# Patient Record
Sex: FEMALE | Race: OTHER | HISPANIC OR LATINO | Employment: UNEMPLOYED | URBAN - METROPOLITAN AREA
[De-identification: names, ages, dates, MRNs, and addresses within clinical notes are randomized per-mention and may not be internally consistent; named-entity substitution may affect disease eponyms.]

---

## 2017-01-16 ENCOUNTER — ALLSCRIPTS OFFICE VISIT (OUTPATIENT)
Dept: OTHER | Facility: OTHER | Age: 42
End: 2017-01-16

## 2017-01-16 DIAGNOSIS — Z11.3 ENCOUNTER FOR SCREENING FOR INFECTIONS WITH PREDOMINANTLY SEXUAL MODE OF TRANSMISSION: ICD-10-CM

## 2017-01-16 PROCEDURE — 87491 CHLMYD TRACH DNA AMP PROBE: CPT | Performed by: FAMILY MEDICINE

## 2017-01-16 PROCEDURE — G0145 SCR C/V CYTO,THINLAYER,RESCR: HCPCS | Performed by: FAMILY MEDICINE

## 2017-01-16 PROCEDURE — 87591 N.GONORRHOEAE DNA AMP PROB: CPT | Performed by: FAMILY MEDICINE

## 2017-01-16 PROCEDURE — 87624 HPV HI-RISK TYP POOLED RSLT: CPT | Performed by: FAMILY MEDICINE

## 2017-01-18 ENCOUNTER — LAB REQUISITION (OUTPATIENT)
Dept: LAB | Facility: HOSPITAL | Age: 42
End: 2017-01-18
Payer: COMMERCIAL

## 2017-01-18 DIAGNOSIS — Z11.3 ENCOUNTER FOR SCREENING FOR INFECTIONS WITH PREDOMINANTLY SEXUAL MODE OF TRANSMISSION: ICD-10-CM

## 2017-01-18 DIAGNOSIS — Z80.3 FAMILY HISTORY OF MALIGNANT NEOPLASM OF BREAST: ICD-10-CM

## 2017-01-18 DIAGNOSIS — Z01.419 ENCOUNTER FOR GYNECOLOGICAL EXAMINATION WITHOUT ABNORMAL FINDING: ICD-10-CM

## 2017-01-20 LAB
CHLAMYDIA DNA CVX QL NAA+PROBE: NORMAL
N GONORRHOEA DNA GENITAL QL NAA+PROBE: NORMAL

## 2017-01-24 LAB
HPV RRNA GENITAL QL NAA+PROBE: NORMAL
LAB AP GYN PRIMARY INTERPRETATION: NORMAL
Lab: NORMAL

## 2017-01-29 ENCOUNTER — OFFICE VISIT (OUTPATIENT)
Dept: URGENT CARE | Age: 42
End: 2017-01-29
Payer: COMMERCIAL

## 2017-01-29 PROCEDURE — G0382 LEV 3 HOSP TYPE B ED VISIT: HCPCS | Performed by: FAMILY MEDICINE

## 2017-01-29 PROCEDURE — 99283 EMERGENCY DEPT VISIT LOW MDM: CPT | Performed by: FAMILY MEDICINE

## 2017-01-30 ENCOUNTER — ALLSCRIPTS OFFICE VISIT (OUTPATIENT)
Dept: OTHER | Facility: OTHER | Age: 42
End: 2017-01-30

## 2017-02-26 ENCOUNTER — HOSPITAL ENCOUNTER (OUTPATIENT)
Dept: ULTRASOUND IMAGING | Facility: HOSPITAL | Age: 42
Discharge: HOME | End: 2017-02-26
Payer: COMMERCIAL

## 2017-02-26 DIAGNOSIS — R93.89 ABNORMAL FINDINGS ON DIAGNOSTIC IMAGING OF OTHER SPECIFIED BODY STRUCTURES: ICD-10-CM

## 2017-02-26 PROCEDURE — 76536 US EXAM OF HEAD AND NECK: CPT

## 2017-03-06 ENCOUNTER — HOSPITAL ENCOUNTER (OUTPATIENT)
Dept: ULTRASOUND IMAGING | Facility: CLINIC | Age: 42
Discharge: HOME | End: 2017-03-06
Payer: COMMERCIAL

## 2017-03-06 ENCOUNTER — HOSPITAL ENCOUNTER (OUTPATIENT)
Dept: MAMMOGRAPHY | Facility: CLINIC | Age: 42
Discharge: HOME | End: 2017-03-06
Payer: COMMERCIAL

## 2017-03-06 DIAGNOSIS — N64.4 MASTODYNIA: ICD-10-CM

## 2017-03-06 PROCEDURE — G0206 DX MAMMO INCL CAD UNI: HCPCS

## 2017-03-06 PROCEDURE — 76642 ULTRASOUND BREAST LIMITED: CPT

## 2017-04-24 ENCOUNTER — GENERIC CONVERSION - ENCOUNTER (OUTPATIENT)
Dept: OTHER | Facility: OTHER | Age: 42
End: 2017-04-24

## 2017-05-08 ENCOUNTER — HOSPITAL ENCOUNTER (EMERGENCY)
Facility: HOSPITAL | Age: 42
Discharge: HOME | End: 2017-05-08
Attending: EMERGENCY MEDICINE
Payer: COMMERCIAL

## 2017-05-08 VITALS
TEMPERATURE: 97.7 F | HEART RATE: 85 BPM | OXYGEN SATURATION: 100 % | SYSTOLIC BLOOD PRESSURE: 117 MMHG | DIASTOLIC BLOOD PRESSURE: 72 MMHG | RESPIRATION RATE: 18 BRPM | WEIGHT: 110 LBS

## 2017-05-08 DIAGNOSIS — G89.29 CHRONIC RIGHT-SIDED LOW BACK PAIN WITHOUT SCIATICA: Primary | ICD-10-CM

## 2017-05-08 DIAGNOSIS — M54.50 CHRONIC RIGHT-SIDED LOW BACK PAIN WITHOUT SCIATICA: Primary | ICD-10-CM

## 2017-05-08 LAB
BACTERIA UR QL AUTO: ABNORMAL /HPF
BILIRUB UR QL STRIP: NEGATIVE
CLARITY UR: CLEAR
COLOR UR: YELLOW
COLOR, POC: NORMAL
GLUCOSE UR STRIP-MCNC: NEGATIVE MG/DL
HCG UR QL: NEGATIVE
HGB UR QL STRIP.AUTO: ABNORMAL
HYALINE CASTS #/AREA URNS LPF: ABNORMAL /LPF
KETONES UR STRIP-MCNC: NEGATIVE MG/DL
LEUKOCYTE ESTERASE UR QL STRIP: NEGATIVE
NITRITE UR QL STRIP: NEGATIVE
NON-SQ EPI CELLS URNS QL MICRO: ABNORMAL /HPF
PH UR STRIP.AUTO: 6.5 [PH] (ref 4.5–8)
PROT UR STRIP-MCNC: NEGATIVE MG/DL
RBC #/AREA URNS AUTO: ABNORMAL /HPF
SP GR UR STRIP.AUTO: 1.02 (ref 1–1.03)
UROBILINOGEN UR QL STRIP.AUTO: 0.2 E.U./DL
WBC #/AREA URNS AUTO: ABNORMAL /HPF

## 2017-05-08 PROCEDURE — 81002 URINALYSIS NONAUTO W/O SCOPE: CPT

## 2017-05-08 PROCEDURE — 87086 URINE CULTURE/COLONY COUNT: CPT

## 2017-05-08 PROCEDURE — 81025 URINE PREGNANCY TEST: CPT

## 2017-05-08 PROCEDURE — 96372 THER/PROPH/DIAG INJ SC/IM: CPT

## 2017-05-08 PROCEDURE — 99283 EMERGENCY DEPT VISIT LOW MDM: CPT

## 2017-05-08 PROCEDURE — 81001 URINALYSIS AUTO W/SCOPE: CPT

## 2017-05-08 RX ORDER — NAPROXEN 500 MG/1
500 TABLET ORAL 2 TIMES DAILY WITH MEALS
Qty: 60 TABLET | Refills: 0 | Status: SHIPPED | OUTPATIENT
Start: 2017-05-08 | End: 2017-09-05

## 2017-05-08 RX ORDER — KETOROLAC TROMETHAMINE 30 MG/ML
15 INJECTION, SOLUTION INTRAMUSCULAR; INTRAVENOUS ONCE
Status: COMPLETED | OUTPATIENT
Start: 2017-05-08 | End: 2017-05-08

## 2017-05-08 RX ORDER — OXYCODONE HYDROCHLORIDE AND ACETAMINOPHEN 5; 325 MG/1; MG/1
1 TABLET ORAL EVERY 4 HOURS PRN
Qty: 10 TABLET | Refills: 0 | Status: SHIPPED | OUTPATIENT
Start: 2017-05-08 | End: 2017-06-03

## 2017-05-08 RX ADMIN — KETOROLAC TROMETHAMINE 15 MG: 30 INJECTION, SOLUTION INTRAMUSCULAR at 22:45

## 2017-05-10 LAB — BACTERIA UR CULT: NORMAL

## 2017-05-12 ENCOUNTER — ALLSCRIPTS OFFICE VISIT (OUTPATIENT)
Dept: OTHER | Facility: OTHER | Age: 42
End: 2017-05-12

## 2017-05-12 DIAGNOSIS — R59.0 LOCALIZED ENLARGED LYMPH NODES: ICD-10-CM

## 2017-06-03 ENCOUNTER — APPOINTMENT (EMERGENCY)
Dept: RADIOLOGY | Facility: HOSPITAL | Age: 42
End: 2017-06-03
Payer: COMMERCIAL

## 2017-06-03 ENCOUNTER — HOSPITAL ENCOUNTER (EMERGENCY)
Facility: HOSPITAL | Age: 42
Discharge: HOME | End: 2017-06-03
Attending: EMERGENCY MEDICINE
Payer: COMMERCIAL

## 2017-06-03 VITALS
WEIGHT: 120 LBS | DIASTOLIC BLOOD PRESSURE: 73 MMHG | HEART RATE: 90 BPM | HEIGHT: 64 IN | TEMPERATURE: 98.8 F | RESPIRATION RATE: 20 BRPM | OXYGEN SATURATION: 100 % | SYSTOLIC BLOOD PRESSURE: 119 MMHG | BODY MASS INDEX: 20.49 KG/M2

## 2017-06-03 DIAGNOSIS — N83.202 LEFT OVARIAN CYST: ICD-10-CM

## 2017-06-03 DIAGNOSIS — R10.9 ABDOMINAL PAIN: Primary | ICD-10-CM

## 2017-06-03 DIAGNOSIS — K31.89 RETAINED FOOD IN STOMACH: ICD-10-CM

## 2017-06-03 LAB
ANION GAP BLD CALC-SCNC: 17 MMOL/L (ref 4–13)
BACTERIA UR QL AUTO: NORMAL /HPF
BILIRUB UR QL STRIP: NEGATIVE
BUN BLD-MCNC: 15 MG/DL (ref 5–25)
CA-I BLD-SCNC: 1.22 MMOL/L (ref 1.12–1.32)
CHLORIDE BLD-SCNC: 102 MMOL/L (ref 100–108)
CLARITY UR: CLEAR
COLOR UR: YELLOW
CREAT BLD-MCNC: 0.6 MG/DL (ref 0.6–1.3)
GFR SERPL CREATININE-BSD FRML MDRD: >60 ML/MIN/1.73SQ M
GLUCOSE SERPL-MCNC: 111 MG/DL (ref 65–140)
GLUCOSE UR STRIP-MCNC: NEGATIVE MG/DL
HCG UR QL: NEGATIVE
HCT VFR BLD CALC: 44 % (ref 34.8–46.1)
HGB BLDA-MCNC: 15 G/DL (ref 11.5–15.4)
HGB UR QL STRIP.AUTO: ABNORMAL
HYALINE CASTS #/AREA URNS LPF: NORMAL /LPF
KETONES UR STRIP-MCNC: NEGATIVE MG/DL
LEUKOCYTE ESTERASE UR QL STRIP: NEGATIVE
NITRITE UR QL STRIP: NEGATIVE
NON-SQ EPI CELLS URNS QL MICRO: NORMAL /HPF
PCO2 BLD: 26 MMOL/L (ref 21–32)
PH UR STRIP.AUTO: 6.5 [PH] (ref 4.5–8)
POTASSIUM BLD-SCNC: 3.9 MMOL/L (ref 3.5–5.3)
PROT UR STRIP-MCNC: NEGATIVE MG/DL
RBC #/AREA URNS AUTO: NORMAL /HPF
SODIUM BLD-SCNC: 140 MMOL/L (ref 136–145)
SP GR UR STRIP.AUTO: 1.02 (ref 1–1.03)
SPECIMEN SOURCE: ABNORMAL
UROBILINOGEN UR QL STRIP.AUTO: 0.2 E.U./DL
WBC #/AREA URNS AUTO: NORMAL /HPF

## 2017-06-03 PROCEDURE — 96372 THER/PROPH/DIAG INJ SC/IM: CPT

## 2017-06-03 PROCEDURE — 87591 N.GONORRHOEAE DNA AMP PROB: CPT | Performed by: EMERGENCY MEDICINE

## 2017-06-03 PROCEDURE — 85014 HEMATOCRIT: CPT

## 2017-06-03 PROCEDURE — 81001 URINALYSIS AUTO W/SCOPE: CPT

## 2017-06-03 PROCEDURE — 80047 BASIC METABLC PNL IONIZED CA: CPT

## 2017-06-03 PROCEDURE — 99284 EMERGENCY DEPT VISIT MOD MDM: CPT

## 2017-06-03 PROCEDURE — 81025 URINE PREGNANCY TEST: CPT | Performed by: EMERGENCY MEDICINE

## 2017-06-03 PROCEDURE — 81002 URINALYSIS NONAUTO W/O SCOPE: CPT | Performed by: EMERGENCY MEDICINE

## 2017-06-03 PROCEDURE — 87491 CHLMYD TRACH DNA AMP PROBE: CPT | Performed by: EMERGENCY MEDICINE

## 2017-06-03 PROCEDURE — 74177 CT ABD & PELVIS W/CONTRAST: CPT

## 2017-06-03 RX ORDER — NYSTATIN 100000 [USP'U]/G
POWDER TOPICAL 4 TIMES DAILY
COMMUNITY
End: 2019-09-13

## 2017-06-03 RX ORDER — KETOROLAC TROMETHAMINE 30 MG/ML
15 INJECTION, SOLUTION INTRAMUSCULAR; INTRAVENOUS ONCE
Status: DISCONTINUED | OUTPATIENT
Start: 2017-06-03 | End: 2017-06-03

## 2017-06-03 RX ORDER — KETOROLAC TROMETHAMINE 30 MG/ML
15 INJECTION, SOLUTION INTRAMUSCULAR; INTRAVENOUS ONCE
Status: COMPLETED | OUTPATIENT
Start: 2017-06-03 | End: 2017-06-03

## 2017-06-03 RX ORDER — MONTELUKAST SODIUM 10 MG/1
10 TABLET ORAL AS NEEDED
COMMUNITY
End: 2019-09-13

## 2017-06-03 RX ORDER — FLUTICASONE PROPIONATE 50 MCG
1 SPRAY, SUSPENSION (ML) NASAL DAILY
COMMUNITY
End: 2019-09-13

## 2017-06-03 RX ORDER — NAPROXEN 500 MG/1
500 TABLET ORAL 2 TIMES DAILY WITH MEALS
Qty: 10 TABLET | Refills: 0 | Status: SHIPPED | OUTPATIENT
Start: 2017-06-03 | End: 2017-09-05

## 2017-06-03 RX ORDER — LORATADINE 10 MG/1
10 TABLET ORAL DAILY
COMMUNITY
End: 2019-09-13

## 2017-06-03 RX ADMIN — IOHEXOL 100 ML: 350 INJECTION, SOLUTION INTRAVENOUS at 16:32

## 2017-06-03 RX ADMIN — KETOROLAC TROMETHAMINE 15 MG: 30 INJECTION, SOLUTION INTRAMUSCULAR at 17:10

## 2017-06-07 LAB
CHLAMYDIA DNA CVX QL NAA+PROBE: NORMAL
N GONORRHOEA DNA GENITAL QL NAA+PROBE: NORMAL

## 2017-06-12 ENCOUNTER — ALLSCRIPTS OFFICE VISIT (OUTPATIENT)
Dept: OTHER | Facility: OTHER | Age: 42
End: 2017-06-12

## 2017-06-26 ENCOUNTER — ALLSCRIPTS OFFICE VISIT (OUTPATIENT)
Dept: OTHER | Facility: OTHER | Age: 42
End: 2017-06-26

## 2017-06-26 DIAGNOSIS — R31.29 OTHER MICROSCOPIC HEMATURIA: ICD-10-CM

## 2017-07-29 ENCOUNTER — HOSPITAL ENCOUNTER (OUTPATIENT)
Dept: RADIOLOGY | Facility: HOSPITAL | Age: 42
Discharge: HOME | End: 2017-07-29
Payer: COMMERCIAL

## 2017-07-29 ENCOUNTER — APPOINTMENT (OUTPATIENT)
Dept: LAB | Facility: HOSPITAL | Age: 42
End: 2017-07-29
Attending: FAMILY MEDICINE
Payer: COMMERCIAL

## 2017-07-29 ENCOUNTER — TRANSCRIBE ORDERS (OUTPATIENT)
Dept: LAB | Facility: HOSPITAL | Age: 42
End: 2017-07-29

## 2017-07-29 DIAGNOSIS — R59.0 LOCALIZED ENLARGED LYMPH NODES: ICD-10-CM

## 2017-07-29 DIAGNOSIS — R31.29 OTHER MICROSCOPIC HEMATURIA: ICD-10-CM

## 2017-07-29 LAB
BILIRUB UR QL STRIP: NEGATIVE
CLARITY UR: NORMAL
COLOR UR: YELLOW
GLUCOSE UR STRIP-MCNC: NEGATIVE MG/DL
HGB UR QL STRIP.AUTO: NEGATIVE
KETONES UR STRIP-MCNC: NEGATIVE MG/DL
LEUKOCYTE ESTERASE UR QL STRIP: NEGATIVE
NITRITE UR QL STRIP: NEGATIVE
PH UR STRIP.AUTO: 7.5 [PH] (ref 4.5–8)
PROT UR STRIP-MCNC: NEGATIVE MG/DL
SP GR UR STRIP.AUTO: 1.02 (ref 1–1.03)
UROBILINOGEN UR QL STRIP.AUTO: 0.2 E.U./DL

## 2017-07-29 PROCEDURE — 81003 URINALYSIS AUTO W/O SCOPE: CPT

## 2017-07-29 PROCEDURE — 76536 US EXAM OF HEAD AND NECK: CPT

## 2017-09-05 ENCOUNTER — HOSPITAL ENCOUNTER (EMERGENCY)
Facility: HOSPITAL | Age: 42
Discharge: HOME | End: 2017-09-05
Attending: EMERGENCY MEDICINE | Admitting: EMERGENCY MEDICINE
Payer: COMMERCIAL

## 2017-09-05 ENCOUNTER — APPOINTMENT (EMERGENCY)
Dept: RADIOLOGY | Facility: HOSPITAL | Age: 42
End: 2017-09-05
Payer: COMMERCIAL

## 2017-09-05 VITALS
OXYGEN SATURATION: 99 % | RESPIRATION RATE: 16 BRPM | DIASTOLIC BLOOD PRESSURE: 54 MMHG | TEMPERATURE: 100.3 F | WEIGHT: 110 LBS | HEART RATE: 73 BPM | SYSTOLIC BLOOD PRESSURE: 99 MMHG | BODY MASS INDEX: 18.88 KG/M2

## 2017-09-05 DIAGNOSIS — R50.9 FEVER: ICD-10-CM

## 2017-09-05 DIAGNOSIS — R10.9 ABDOMINAL PAIN: Primary | ICD-10-CM

## 2017-09-05 DIAGNOSIS — R31.9 HEMATURIA: ICD-10-CM

## 2017-09-05 LAB
ALBUMIN SERPL BCP-MCNC: 3.5 G/DL (ref 3.5–5)
ALP SERPL-CCNC: 86 U/L (ref 46–116)
ALT SERPL W P-5'-P-CCNC: 16 U/L (ref 12–78)
ANION GAP SERPL CALCULATED.3IONS-SCNC: 7 MMOL/L (ref 4–13)
AST SERPL W P-5'-P-CCNC: 16 U/L (ref 5–45)
BACTERIA UR QL AUTO: ABNORMAL /HPF
BASOPHILS # BLD MANUAL: 0 THOUSAND/UL (ref 0–0.1)
BASOPHILS NFR MAR MANUAL: 0 % (ref 0–1)
BILIRUB SERPL-MCNC: 0.25 MG/DL (ref 0.2–1)
BILIRUB UR QL STRIP: NEGATIVE
BUN SERPL-MCNC: 12 MG/DL (ref 5–25)
CALCIUM SERPL-MCNC: 8 MG/DL (ref 8.3–10.1)
CHLORIDE SERPL-SCNC: 107 MMOL/L (ref 100–108)
CLARITY UR: CLEAR
CO2 SERPL-SCNC: 24 MMOL/L (ref 21–32)
COLOR UR: YELLOW
COLOR, POC: NORMAL
CREAT SERPL-MCNC: 0.65 MG/DL (ref 0.6–1.3)
EOSINOPHIL # BLD MANUAL: 0 THOUSAND/UL (ref 0–0.4)
EOSINOPHIL NFR BLD MANUAL: 0 % (ref 0–6)
ERYTHROCYTE [DISTWIDTH] IN BLOOD BY AUTOMATED COUNT: 13.1 % (ref 11.6–15.1)
GFR SERPL CREATININE-BSD FRML MDRD: 111 ML/MIN/1.73SQ M
GLUCOSE SERPL-MCNC: 104 MG/DL (ref 65–140)
GLUCOSE UR STRIP-MCNC: NEGATIVE MG/DL
HCG UR QL: NORMAL
HCG UR QL: NORMAL
HCT VFR BLD AUTO: 37.7 % (ref 34.8–46.1)
HGB BLD-MCNC: 13 G/DL (ref 11.5–15.4)
HGB UR QL STRIP.AUTO: ABNORMAL
KETONES UR STRIP-MCNC: NEGATIVE MG/DL
LEUKOCYTE ESTERASE UR QL STRIP: ABNORMAL
LYMPHOCYTES # BLD AUTO: 0.78 THOUSAND/UL (ref 0.6–4.47)
LYMPHOCYTES # BLD AUTO: 6 % (ref 14–44)
MCH RBC QN AUTO: 30.2 PG (ref 26.8–34.3)
MCHC RBC AUTO-ENTMCNC: 34.5 G/DL (ref 31.4–37.4)
MCV RBC AUTO: 88 FL (ref 82–98)
MONOCYTES # BLD AUTO: 0.39 THOUSAND/UL (ref 0–1.22)
MONOCYTES NFR BLD: 3 % (ref 4–12)
NEUTROPHILS # BLD MANUAL: 11.9 THOUSAND/UL (ref 1.85–7.62)
NEUTS SEG NFR BLD AUTO: 91 % (ref 43–75)
NITRITE UR QL STRIP: NEGATIVE
NON-SQ EPI CELLS URNS QL MICRO: ABNORMAL /HPF
NRBC BLD AUTO-RTO: 0 /100 WBCS
PH UR STRIP.AUTO: 7 [PH] (ref 4.5–8)
PLATELET # BLD AUTO: 257 THOUSANDS/UL (ref 149–390)
PLATELET BLD QL SMEAR: ADEQUATE
PMV BLD AUTO: 10.1 FL (ref 8.9–12.7)
POTASSIUM SERPL-SCNC: 3.7 MMOL/L (ref 3.5–5.3)
PROT SERPL-MCNC: 7.4 G/DL (ref 6.4–8.2)
PROT UR STRIP-MCNC: NEGATIVE MG/DL
RBC # BLD AUTO: 4.3 MILLION/UL (ref 3.81–5.12)
RBC #/AREA URNS AUTO: ABNORMAL /HPF
RBC MORPH BLD: NORMAL
SODIUM SERPL-SCNC: 138 MMOL/L (ref 136–145)
SP GR UR STRIP.AUTO: 1.02 (ref 1–1.03)
UROBILINOGEN UR QL STRIP.AUTO: 0.2 E.U./DL
WBC # BLD AUTO: 13.08 THOUSAND/UL (ref 4.31–10.16)
WBC #/AREA URNS AUTO: ABNORMAL /HPF

## 2017-09-05 PROCEDURE — 96360 HYDRATION IV INFUSION INIT: CPT

## 2017-09-05 PROCEDURE — 81025 URINE PREGNANCY TEST: CPT | Performed by: EMERGENCY MEDICINE

## 2017-09-05 PROCEDURE — 81002 URINALYSIS NONAUTO W/O SCOPE: CPT | Performed by: EMERGENCY MEDICINE

## 2017-09-05 PROCEDURE — 99284 EMERGENCY DEPT VISIT MOD MDM: CPT

## 2017-09-05 PROCEDURE — 81001 URINALYSIS AUTO W/SCOPE: CPT

## 2017-09-05 PROCEDURE — 85027 COMPLETE CBC AUTOMATED: CPT | Performed by: EMERGENCY MEDICINE

## 2017-09-05 PROCEDURE — 80053 COMPREHEN METABOLIC PANEL: CPT | Performed by: EMERGENCY MEDICINE

## 2017-09-05 PROCEDURE — 96361 HYDRATE IV INFUSION ADD-ON: CPT

## 2017-09-05 PROCEDURE — 85007 BL SMEAR W/DIFF WBC COUNT: CPT | Performed by: EMERGENCY MEDICINE

## 2017-09-05 PROCEDURE — 74177 CT ABD & PELVIS W/CONTRAST: CPT

## 2017-09-05 PROCEDURE — 36415 COLL VENOUS BLD VENIPUNCTURE: CPT | Performed by: EMERGENCY MEDICINE

## 2017-09-05 RX ORDER — ACETAMINOPHEN 325 MG/1
975 TABLET ORAL ONCE
Status: COMPLETED | OUTPATIENT
Start: 2017-09-05 | End: 2017-09-05

## 2017-09-05 RX ADMIN — IOHEXOL 100 ML: 350 INJECTION, SOLUTION INTRAVENOUS at 05:27

## 2017-09-05 RX ADMIN — ACETAMINOPHEN 975 MG: 325 TABLET, FILM COATED ORAL at 04:43

## 2017-09-05 RX ADMIN — SODIUM CHLORIDE 1000 ML: 0.9 INJECTION, SOLUTION INTRAVENOUS at 04:00

## 2017-09-25 ENCOUNTER — ALLSCRIPTS OFFICE VISIT (OUTPATIENT)
Dept: OTHER | Facility: OTHER | Age: 42
End: 2017-09-25

## 2017-11-07 ENCOUNTER — GENERIC CONVERSION - ENCOUNTER (OUTPATIENT)
Dept: OTHER | Facility: OTHER | Age: 42
End: 2017-11-07

## 2018-01-12 VITALS
HEART RATE: 78 BPM | WEIGHT: 111.5 LBS | HEIGHT: 61 IN | SYSTOLIC BLOOD PRESSURE: 106 MMHG | TEMPERATURE: 98.3 F | BODY MASS INDEX: 21.05 KG/M2 | RESPIRATION RATE: 16 BRPM | DIASTOLIC BLOOD PRESSURE: 60 MMHG

## 2018-01-12 NOTE — MISCELLANEOUS
Provider Comments  Provider Comments:   pt no showed today      Signatures   Electronically signed by : Taurus Krishnan, ; Apr 24 2017  9:29AM EST                       (Author)

## 2018-01-12 NOTE — MISCELLANEOUS
Provider Comments  Provider Comments:   PT NO SHOW FOR APPT      Signatures   Electronically signed by : Cami Sky, ; Apr 18 2016 11:29AM EST                       (Author)    Electronically signed by :  Beto Ahuja MD; Apr 18 2016 11:30AM EST                       (Author)
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
## 2018-01-13 VITALS
TEMPERATURE: 97.4 F | HEART RATE: 74 BPM | HEIGHT: 61 IN | WEIGHT: 111.38 LBS | DIASTOLIC BLOOD PRESSURE: 70 MMHG | RESPIRATION RATE: 14 BRPM | BODY MASS INDEX: 21.03 KG/M2 | SYSTOLIC BLOOD PRESSURE: 112 MMHG

## 2018-01-13 VITALS
HEIGHT: 61 IN | SYSTOLIC BLOOD PRESSURE: 110 MMHG | HEART RATE: 84 BPM | BODY MASS INDEX: 21 KG/M2 | TEMPERATURE: 98.4 F | DIASTOLIC BLOOD PRESSURE: 74 MMHG | WEIGHT: 111.25 LBS | RESPIRATION RATE: 18 BRPM

## 2018-01-13 NOTE — MISCELLANEOUS
Provider Comments  Provider Comments:   pt no showed today      Signatures   Electronically signed by : Vickie Kaur, ; Nov 7 2016  9:48AM EST                       (Author)
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
## 2018-01-14 VITALS
DIASTOLIC BLOOD PRESSURE: 70 MMHG | SYSTOLIC BLOOD PRESSURE: 110 MMHG | TEMPERATURE: 99 F | RESPIRATION RATE: 16 BRPM | WEIGHT: 111 LBS | BODY MASS INDEX: 20.97 KG/M2 | HEART RATE: 88 BPM

## 2018-01-16 ENCOUNTER — ALLSCRIPTS OFFICE VISIT (OUTPATIENT)
Dept: OTHER | Facility: OTHER | Age: 43
End: 2018-01-16

## 2018-01-16 DIAGNOSIS — Z80.9 FAMILY HISTORY OF MALIGNANT NEOPLASM: ICD-10-CM

## 2018-01-16 DIAGNOSIS — Z12.31 ENCOUNTER FOR SCREENING MAMMOGRAM FOR MALIGNANT NEOPLASM OF BREAST: ICD-10-CM

## 2018-01-16 DIAGNOSIS — Z13.220 ENCOUNTER FOR SCREENING FOR LIPOID DISORDERS: ICD-10-CM

## 2018-01-16 DIAGNOSIS — Z82.49 FAMILY HISTORY OF ISCHEMIC HEART DISEASE AND OTHER DISEASES OF THE CIRCULATORY SYSTEM: ICD-10-CM

## 2018-01-16 DIAGNOSIS — E55.9 VITAMIN D DEFICIENCY: ICD-10-CM

## 2018-01-16 DIAGNOSIS — E04.1 NONTOXIC SINGLE THYROID NODULE: ICD-10-CM

## 2018-01-16 DIAGNOSIS — N83.201 CYST OF RIGHT OVARY: ICD-10-CM

## 2018-01-16 DIAGNOSIS — R20.2 PARESTHESIA OF SKIN: ICD-10-CM

## 2018-01-16 NOTE — MISCELLANEOUS
Provider Comments  Provider Comments:   pt no showed today      Signatures   Electronically signed by : Ignacio Fields, ; Sep 25 2017  9:06AM EST                       (Author)

## 2018-01-16 NOTE — MISCELLANEOUS
Provider Comments  Provider Comments:   pt was a no show to appt today      Signatures   Electronically signed by : Elva Herrera, ; Jan 30 2017  5:38PM EST                       (Author)

## 2018-01-17 ENCOUNTER — HOSPITAL ENCOUNTER (OUTPATIENT)
Dept: RADIOLOGY | Age: 43
Discharge: HOME | End: 2018-01-17
Payer: COMMERCIAL

## 2018-01-17 ENCOUNTER — APPOINTMENT (OUTPATIENT)
Dept: LAB | Age: 43
End: 2018-01-17
Payer: COMMERCIAL

## 2018-01-17 ENCOUNTER — TRANSCRIBE ORDERS (OUTPATIENT)
Dept: ADMINISTRATIVE | Age: 43
End: 2018-01-17

## 2018-01-17 DIAGNOSIS — Z80.9 FAMILY HISTORY OF MALIGNANT NEOPLASM: ICD-10-CM

## 2018-01-17 DIAGNOSIS — E04.1 NONTOXIC SINGLE THYROID NODULE: ICD-10-CM

## 2018-01-17 DIAGNOSIS — R20.2 PARESTHESIA OF SKIN: ICD-10-CM

## 2018-01-17 DIAGNOSIS — N83.201 CYST OF RIGHT OVARY: ICD-10-CM

## 2018-01-17 DIAGNOSIS — Z13.220 ENCOUNTER FOR SCREENING FOR LIPOID DISORDERS: ICD-10-CM

## 2018-01-17 DIAGNOSIS — E55.9 VITAMIN D DEFICIENCY: ICD-10-CM

## 2018-01-17 DIAGNOSIS — Z82.49 FAMILY HISTORY OF ISCHEMIC HEART DISEASE AND OTHER DISEASES OF THE CIRCULATORY SYSTEM: ICD-10-CM

## 2018-01-17 LAB
25(OH)D3 SERPL-MCNC: 16.5 NG/ML (ref 30–100)
ALBUMIN SERPL BCP-MCNC: 3.8 G/DL (ref 3.5–5)
ALP SERPL-CCNC: 69 U/L (ref 46–116)
ALT SERPL W P-5'-P-CCNC: 18 U/L (ref 12–78)
ANION GAP SERPL CALCULATED.3IONS-SCNC: 6 MMOL/L (ref 4–13)
AST SERPL W P-5'-P-CCNC: 15 U/L (ref 5–45)
BASOPHILS # BLD AUTO: 0.01 THOUSANDS/ΜL (ref 0–0.1)
BASOPHILS NFR BLD AUTO: 0 % (ref 0–1)
BILIRUB SERPL-MCNC: 0.53 MG/DL (ref 0.2–1)
BUN SERPL-MCNC: 16 MG/DL (ref 5–25)
CALCIUM SERPL-MCNC: 8.6 MG/DL (ref 8.3–10.1)
CHLORIDE SERPL-SCNC: 104 MMOL/L (ref 100–108)
CO2 SERPL-SCNC: 28 MMOL/L (ref 21–32)
CREAT SERPL-MCNC: 0.51 MG/DL (ref 0.6–1.3)
EOSINOPHIL # BLD AUTO: 0.09 THOUSAND/ΜL (ref 0–0.61)
EOSINOPHIL NFR BLD AUTO: 2 % (ref 0–6)
ERYTHROCYTE [DISTWIDTH] IN BLOOD BY AUTOMATED COUNT: 13.3 % (ref 11.6–15.1)
GFR SERPL CREATININE-BSD FRML MDRD: 120 ML/MIN/1.73SQ M
GLUCOSE P FAST SERPL-MCNC: 84 MG/DL (ref 65–99)
HCT VFR BLD AUTO: 40 % (ref 34.8–46.1)
HGB BLD-MCNC: 13.6 G/DL (ref 11.5–15.4)
LYMPHOCYTES # BLD AUTO: 1.39 THOUSANDS/ΜL (ref 0.6–4.47)
LYMPHOCYTES NFR BLD AUTO: 29 % (ref 14–44)
MCH RBC QN AUTO: 30.2 PG (ref 26.8–34.3)
MCHC RBC AUTO-ENTMCNC: 34 G/DL (ref 31.4–37.4)
MCV RBC AUTO: 89 FL (ref 82–98)
MONOCYTES # BLD AUTO: 0.34 THOUSAND/ΜL (ref 0.17–1.22)
MONOCYTES NFR BLD AUTO: 7 % (ref 4–12)
NEUTROPHILS # BLD AUTO: 2.97 THOUSANDS/ΜL (ref 1.85–7.62)
NEUTS SEG NFR BLD AUTO: 62 % (ref 43–75)
NRBC BLD AUTO-RTO: 0 /100 WBCS
PLATELET # BLD AUTO: 309 THOUSANDS/UL (ref 149–390)
PMV BLD AUTO: 10.5 FL (ref 8.9–12.7)
POTASSIUM SERPL-SCNC: 3.7 MMOL/L (ref 3.5–5.3)
PROT SERPL-MCNC: 7.5 G/DL (ref 6.4–8.2)
RBC # BLD AUTO: 4.51 MILLION/UL (ref 3.81–5.12)
SODIUM SERPL-SCNC: 138 MMOL/L (ref 136–145)
TSH SERPL DL<=0.05 MIU/L-ACNC: 1.57 UIU/ML (ref 0.36–3.74)
VIT B12 SERPL-MCNC: 164 PG/ML (ref 100–900)
WBC # BLD AUTO: 4.8 THOUSAND/UL (ref 4.31–10.16)

## 2018-01-17 PROCEDURE — 82607 VITAMIN B-12: CPT

## 2018-01-17 PROCEDURE — 85025 COMPLETE CBC W/AUTO DIFF WBC: CPT

## 2018-01-17 PROCEDURE — 36415 COLL VENOUS BLD VENIPUNCTURE: CPT

## 2018-01-17 PROCEDURE — 80053 COMPREHEN METABOLIC PANEL: CPT

## 2018-01-17 PROCEDURE — 76856 US EXAM PELVIC COMPLETE: CPT

## 2018-01-17 PROCEDURE — 82306 VITAMIN D 25 HYDROXY: CPT

## 2018-01-17 PROCEDURE — 76830 TRANSVAGINAL US NON-OB: CPT

## 2018-01-17 PROCEDURE — 84443 ASSAY THYROID STIM HORMONE: CPT

## 2018-01-17 PROCEDURE — 83918 ORGANIC ACIDS TOTAL QUANT: CPT

## 2018-01-17 PROCEDURE — 76536 US EXAM OF HEAD AND NECK: CPT

## 2018-01-17 NOTE — PROGRESS NOTES
Assessment   1  Thyroid nodule (241 0) (E04 1)   2  Right hand paresthesia (782 0) (R20 2)   3  Vitamin D deficiency (268 9) (E55 9)   4  FHx: malignant neoplasm of breast (V16 3) (Z80 3) : Mother   · in 29's   5  Visit for screening mammogram (V76 12) (Z12 31)   6  Family history of Metastasis from thyroid cancer : Mother   7  Paresthesia (782 0) (R20 2)   8  Encounter for lipid screening for cardiovascular disease (V77 91,V81 2) (Z13 220,Z13 6)   9  Family history of Type 2 diabetes mellitus without complication, without long-term current     use of insulin : Mother   8  Family history of essential hypertension (V17 49) (Z82 49) : Mother   6  Abnormal uterine bleeding (AUB) (626 9) (N93 9)   12  Cyst of right ovary (620 2) (N83 201)    Plan   Cyst of right ovary    · US PELVIS COMPLETE NON OB; Status:Hold For - Scheduling; Requested    for:16Jan2018;   Encounter for lipid screening for cardiovascular disease, FamHx: Family history of    essential hypertension, FamHx: Type 1 diabetes mellitus with other kidney complication,    FamHx: Type 2 diabetes mellitus without complication, without long-term current use of    insulin    · (1) COMPREHENSIVE METABOLIC PANEL; Status:Active; Requested EAQ:05BJN2578;   FamHx: Family history of malignant neoplasm, Right hand paresthesia    · (1) CBC/PLT/DIFF; Status:Active; Requested for:16Jan2018;   FamHx: Metastasis from thyroid cancer, Visit for screening mammogram    · * MAMMO SCREENING BILATERAL W CAD; Status:Hold For - Scheduling; Requested    for:16Jan2018;   Paresthesia, Vitamin D deficiency    · (1) VITAMIN B12; Status:Active; Requested AFY:03LCW9605;   Right hand paresthesia    · (1) METHYLMALONIC ACID,BLOOD; Status:Active; Requested MGJ:12ZHG5730; Thyroid nodule    · (1) TSH WITH FT4 REFLEX; Status:Active;  Requested CEZ:28IOA0924;    · US THYROID; Status:Hold For - Scheduling; Requested for:16Jan2018;   Vitamin D deficiency    · (1) VITAMIN D 25-HYDROXY; Status:Active; Requested KCQ:67HMU5010; Discussion/Summary      3+ chronic problems and a new problem reviewed  AUB, recurrent, in Lower abdominal pain and h/o R ovarian cyst Pap last year unremarkable, per patient  Pelvic US ordered Rec'd ob-gyn follow-up to have specialist on board with patient care given extensive PMH, family history of breast cancer (prior attempt for genetic testing denied by insurance)  mammogram ordered  Thyroid nodule in h/o thyroid disease No longer with rx management F/u Thyroid US, TSH   Mild persistent asthma in allergic rhinitis, controlled Continue ventolin prn, flonase qd, loratadine 10mg qd F/u asthma action plan   RUE hand paresthesias in h/o B12 deficiency Recheck MMA, B12, CBC   H/o Vit D insufficiency Recheck Vit D   Preventive screening, , higher risk individual Family history of mother with death from thyroid cancer also w/h/o breast cancer, HTN, NIDDM2  Check CMP, lipid panel  The patient was counseled regarding diagnostic results,-- instructions for management,-- risk factor reductions,-- impressions,-- risks and benefits of treatment options,-- importance of compliance with treatment  Possible side effects of new medications were reviewed with the patient/guardian today  The treatment plan was reviewed with the patient/guardian  The patient/guardian understands and agrees with the treatment plan       Self Referrals: No      Chief Complaint   f/u PMH surveillance: thyroid nodule, mammogram screening,      History of Present Illness   39 yo non-smoker F presented from home for a follow-up visit for monitoring of the PMH:   Asthma, mild persistent: Last exacerbation noted 11/2017 treated with steroids and z-maureen  Flonase qd and loratadine 10mg qd for allergies, ventolin prn  No  rx   Patient only notes flares with winter colds occasionally, non-smoker (smoked at age 22 for a couple of years), demonstrates appropriate inhaler technique, no barriers to rx usage, no pet triggers, never intubated  ACT score for qd/hs/control/activity changes/ albuterol usage w/symptoms=23 total = 5-5-4-5-4 (once or twice has a cough, rated as well-controlled)  Chronic LBP w/L5/S1 small herniated disc w/R-sided radiculopathy: Stable  Evaluated by neurology in 2/2015: No evidence of peripheral neuropathy on EMG  F/u with neurosurgery (last seen 6/2014, offered microdiscectomy) & pain management  Neurology rec'd repeat MMA and B12 at f/u at that time of visit  Last flare of back pain managed by our office in summer 2017 with flexeril and tramadol given  HM: Oscal 500/200 taken qd  Tongue papilloma: I recommended ENT f/u in 6/2014  No f/u noted due to resolution  H/o Microscopic hematuria: Resolved  F/u UA was negative in 6/2017  GC negative at that time  Family history: recalled extensive family history of cancer of breast, colon, thyroid  AUB: previously evaluated in 2014, h/o contraception usage with improvement  Not fully reviewed today due to other patient priorities  Notes menses for a couple of days, then spotting then a couple of days later for 3 days, c/o occurrence every 3 weeks over the past 2-3 months  review: Vit D deficiency: Last noted to be 29/6 in 12/2016 MMA elevated with low B12: last noted in 2015  CBC and CMP unremarkable in fall 2016  lipids unremarkable from 3/2016  Previous SPEP, CRP, ESR, NYASIA w/u unremarkable  Review of Systems        Constitutional: no fever-- and-- no chills  ENT: no nasal discharge  Cardiovascular: no chest pain-- and-- no palpitations  Respiratory: no shortness of breath,-- no cough-- and-- no wheezing  Gastrointestinal: abdominal pain-- and-- lower abdominal discomfort, intermittent, non-radiating, not associated with dysuria, a/w back pain that is chronic, but-- as noted in HPI        Genitourinary: pelvic pain-- and-- unexplained vaginal bleeding, but-- no dysuria,-- no vaginal discharge-- and-- no incontinence  Musculoskeletal: arthralgias-- and-- joint stiffness, but-- as noted in HPI,-- no joint swelling,-- no limb pain-- and-- no limb swelling  Integumentary: no rashes  Neurological: tingling-- and-- RUE paresthesias located to hand mainly, but-- no confusion,-- no dizziness,-- no limb weakness,-- no convulsions,-- no fainting-- and-- no difficulty walking  Psychiatric: emotional problems-- and-- deals with death of her mother that occurred within a few years of diagnosis  Hematologic/Lymphatic: no swollen glands,-- no tendency for easy bleeding-- and-- no tendency for easy bruising  Active Problems   1  Abdominal pain (789 00) (R10 9)   2  Abnormal ultrasound of thyroid gland (794 5) (R93 8)   3  Acute sinusitis (461 9) (J01 90)   4  Acute URI (465 9) (J06 9)   5  Anxiety (300 00) (F41 9)   6  Asthma exacerbation (493 92) (J45 901)   7  Breast pain (611 71) (N64 4)   8  Bulging lumbar disc (722 10) (M51 26)   9  Cold intolerance (780 99) (R68 89)   10  Cough variant asthma (493 82) (J45 991)   11  Dense breast tissue (793 82) (R92 2)   12  Depo contraception (V25 49) (Z30 40)   13  Dermatitis (692 9) (L30 9)   14  Encounter for screening mammogram for high-risk patient (V76 11) (Z12 31)   15  Enlarged lymph node in neck (785 6) (R59 0)   16  Familial Breast-ovarian Cancer Syndrome (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 3)   17  Fatigue (780 79) (R53 83)   18  Fever (780 60) (R50 9)   19  Initiation of Depo Provera (V25 02) (Z30 013)   20  Joint pain, knee (719 46) (M25 569)   21  Low back pain (724 2) (M54 5)   22  Lumbago (724 2) (M54 5)   23  Lumbar disc herniation (722 10) (M51 26)   24  Lumbar radiculopathy (724 4) (M54 16)   25  Menorrhagia (626 2) (N92 0)   26  Metrorrhagia (626 6) (N92 1)   27  Moderate persistent asthma (493 90) (J45 40)   28  Neck pain (723 1) (M54 2)   29  Need for diphtheria-tetanus-pertussis (Tdap) vaccine, adult/adolescent (V06 1) (Z23)   30   Need for influenza vaccination (V04 81) (Z23)   31  Paresthesia (782 0) (R20 2)   32  Peripheral neuropathy (356 9) (G62 9)   33  Polyneuropathy (356 9) (G62 9)   34  Sciatica of right side (724 3) (M54 31)   35  Screen for STD (sexually transmitted disease) (V74 5) (Z11 3)   36  Seasonal allergies (477 9) (J30 2)   37  Thyroid nodule (241 0) (E04 1)   38  Visit for routine gyn exam (V72 31) (Z01 419)   39  Visit for screening mammogram (V76 12) (Z12 31)   40  Vitamin D deficiency (268 9) (E55 9)   41  Vulvovaginitis candida albicans (112 1) (B37 3)    Past Medical History   1  Denied: History of substance abuse   2  History of tinea cruris (V12 09) (Z86 19)   3  History of vaginal discharge (V13 29) (Z87 42)   4  Denied: History of Mental health problem   5  History of Microscopic hematuria (599 72) (R31 29)   6  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)   7  History of Thyroid disorder (246 9) (E07 9)   8  History of Vaginal candidiasis (112 1) (B37 3)    Surgical History   1  History of Denial Of Any Significant Medical History   2  Initiation of Depo Provera (V25 02) (Z30 013)    Family History   Mother    1  Family history of essential hypertension (V17 49) (Z82 49)   2  Denied: Family history of mental disorder   3  Denied: Family history of substance abuse   4  FHx: malignant neoplasm of breast (V16 3) (Z80 3)   5  Family history of Metastasis from thyroid cancer   6  Family history of Ovarian Cancer (V16 41)   7  Family history of Type 1 diabetes mellitus with other kidney complication   8  Family history of Type 2 diabetes mellitus without complication, without long-term current     use of insulin  Father    5  Family history of malignant neoplasm (V16 9) (Z80 9)  Maternal Grandmother    10  Family history of hypertension (V17 49) (Z82 49)  Paternal Grandfather    6  Family history of malignant neoplasm (V16 9) (Z80 9)  Maternal Aunt    12   Family history of Ovarian Cancer (V16 41)    Social History    · Caffeine use (V49 89) (F15 90)   · Completed 12th grade   · Depo contraception (V25 49) (Z30 40)   · Denied: History of drug use   · Lives independently with spouse   ·    · Never A Smoker   · Never Drank Alcohol   · Occupation   · Uses Safety Equipment - Seatbelts    Current Meds    1  [employer] Allergy Relief 0 025-0 3 % Ophthalmic Solution; INSTILL 1 DROP INTO BOTH     EYES 2 TIMES DAILY; Therapy: 32GKA2226 to (Last Rx:12May2017)  Requested for: 89LUP9326 Ordered   2  Fluticasone Propionate 50 MCG/ACT Nasal Suspension; USE 2 SPRAYS IN EACH     NOSTRIL ONCE DAILY; Therapy: 74SJB2809 to (Kellee Epp)  Requested for: 11NDS4850; Last     Rx:07Nov2017 Ordered   3  Loratadine 10 MG Oral Tablet; Take 1 tablet daily; Therapy: 51VPO6235 to (Felicia Martins)  Requested for: 13JQT8164; Last     Rx:07Nov2017 Ordered   4  Nystop 036946 UNIT/GM External Powder; apply to affected area twice a day AS     DIRECTED; Therapy: 53Tir8918 to (Barb Quest)  Requested for: 11KID2698; Last     Rx:12May2017 Ordered   5  Oscal 500/200 D-3 500-200 MG-UNIT Oral Tablet; TAKE 1 TABLET DAILY AS DIRECTED; Therapy: 16UIW5761 to (Evaluate:83Jfd0765)  Requested for: 50JCM1099; Last     Rx:01Mar2016 Ordered   6  Ventolin  (90 Base) MCG/ACT Inhalation Aerosol Solution; INHALE 1 TO 2 PUFFS     EVERY 4 TO 6 HOURS AS NEEDED; Therapy: 04OIH3398 to (Last KP:08BKK4633)  Requested for: 26ZBS9221 Ordered    Allergies   1  No Known Drug Allergies  2  No Known Environmental Allergies   3  No Known Food Allergies    Vitals   Vital Signs    Recorded: 19NKF5084 01:51PM   Temperature 97 8 F, Tympanic   Heart Rate 72   Respiration 14   Systolic 192, RUE, Sitting   Diastolic 72, RUE, Sitting   Height 5 ft 1 in   Weight 115 lb    BMI Calculated 21 73   BSA Calculated 1 49   Pain Scale 0     Physical Exam        Constitutional      General appearance: No acute distress, well appearing and well nourished         Eyes Conjunctiva and lids: No swelling, erythema or discharge  Ears, Nose, Mouth, and Throat      Oropharynx: Normal with no erythema, edema, exudate or lesions  -- Prior papilloma no longer present  Pulmonary      Respiratory effort: No increased work of breathing or signs of respiratory distress  Auscultation of lungs: Clear to auscultation  Cardiovascular      Auscultation of heart: Normal rate and rhythm, normal S1 and S2, without murmurs  Examination of extremities for edema and/or varicosities: Normal        Musculoskeletal      Gait and station: Normal        Psychiatric      Orientation to person, place, and time: Normal        Mood and affect: Normal           Results/Data   * US PELVIS W/TRANSVAG (PANEL) 12Apr2014 08:05AM [facility]      Test Name Result Flag Reference   US PELVIS W/ [place] (PANEL) (Report)     [address];;[place];[address]     04/12/2014 0810     04/12/2014 0830               PELVIC ULTRASOUND, COMPLETE          INDICATION- Dysfunctional uterine bleeding  COMPARISON- 3/8/2013          TECHNIQUE-  Transabdominal pelvic ultrasound was performed in sagittal     and transverse planes with a curvilinear transducer  Additional     transvaginal imaging was performed to better evaluate the endometrium     and ovaries  Imaging included volumetric sweeps as well as traditional     still imaging technique  FINDINGS-          UTERUS-     The uterus is anteverted in position, measuring 8 0 x 4 0 x 5 6 cm  Contour and echotexture appear normal        The cervix shows no suspicious abnormality  ENDOMETRIUM-      Normal caliber of 8 mm  Small amount of endometrial fluid is noted  OVARIES/ADNEXA-     Right ovary- 4 2 x 2 9 x 3 5 cm  Doppler flow present  3 6 x 2 7 x 3 1 cm right ovarian cyst is identified with small mural     nodule with no internal vascularity             Left ovary- 2 9 x 1 5 x 2 0 cm  Doppler flow present  No suspicious abnormality  There are no adnexal mass lesions evident  There is no free fluid  IMPRESSION-          Right ovarian cyst with small mural nodule without internal vascularity  Small amount of endometrial fluid  Transcribed on- HENRY Hay MD     Reading Radiologist- HENRY Lang MD     Releasing Radiologist- HENRY Lang MD     Released Date Time- 04/14/14 1924     ------------------------------------------------------------------------------     [id number]     [id number]        Attending Note   Attending Note: I interviewed, took the history and examined the patient,-- the staff discussed the patient on the day of the visit,-- I discussed the case with the Resident and reviewed the Resident's note,-- I supervised the Resident-- and-- I agree with the Resident management plan as it was presented to me  Level of Participation: I was present in clinic and examined the patient  I agree with the Resident's note        Future Appointments      Date/Time Provider Specialty Site   01/29/2018 08:30 AM Ananya Pablo  [address]     Signatures    Electronically signed by : Patrice Christine DO; Jan 16 2018  3:01PM EST                       (Author)     Electronically signed by : MILDRED Martinez ; Jan 16 2018  3:32PM EST                       (Author)

## 2018-01-20 LAB — METHYLMALONATE SERPL-SCNC: 717 NMOL/L (ref 0–378)

## 2018-01-22 VITALS
WEIGHT: 113.38 LBS | HEIGHT: 61 IN | TEMPERATURE: 97.1 F | SYSTOLIC BLOOD PRESSURE: 120 MMHG | HEART RATE: 64 BPM | RESPIRATION RATE: 16 BRPM | BODY MASS INDEX: 21.41 KG/M2 | DIASTOLIC BLOOD PRESSURE: 70 MMHG

## 2018-01-23 ENCOUNTER — ALLSCRIPTS OFFICE VISIT (OUTPATIENT)
Dept: OTHER | Facility: OTHER | Age: 43
End: 2018-01-23

## 2018-01-23 ENCOUNTER — HOSPITAL ENCOUNTER (OUTPATIENT)
Dept: MAMMOGRAPHY | Facility: CLINIC | Age: 43
Discharge: HOME | End: 2018-01-23
Payer: COMMERCIAL

## 2018-01-23 ENCOUNTER — LAB REQUISITION (OUTPATIENT)
Dept: LAB | Facility: HOSPITAL | Age: 43
End: 2018-01-23
Payer: COMMERCIAL

## 2018-01-23 VITALS
RESPIRATION RATE: 14 BRPM | HEART RATE: 72 BPM | BODY MASS INDEX: 21.71 KG/M2 | HEIGHT: 61 IN | TEMPERATURE: 97.8 F | WEIGHT: 115 LBS | SYSTOLIC BLOOD PRESSURE: 110 MMHG | DIASTOLIC BLOOD PRESSURE: 72 MMHG

## 2018-01-23 DIAGNOSIS — Z12.31 ENCOUNTER FOR SCREENING MAMMOGRAM FOR MALIGNANT NEOPLASM OF BREAST: ICD-10-CM

## 2018-01-23 DIAGNOSIS — Z01.419 ENCOUNTER FOR GYNECOLOGICAL EXAMINATION WITHOUT ABNORMAL FINDING: ICD-10-CM

## 2018-01-23 PROCEDURE — G0145 SCR C/V CYTO,THINLAYER,RESCR: HCPCS | Performed by: OBSTETRICS & GYNECOLOGY

## 2018-01-23 PROCEDURE — 77067 SCR MAMMO BI INCL CAD: CPT

## 2018-01-23 PROCEDURE — 77063 BREAST TOMOSYNTHESIS BI: CPT

## 2018-01-24 NOTE — PROGRESS NOTES
Assessment   1  Encounter for routine gynecological examination (V72 31) (Z01 419)  2  Pap smear, as part of routine gynecological examination (V76 2) (Z01 419)  3  Abnormal uterine bleeding (AUB) (626 9) (N93 9)1      1 Amended By: Claudette Browning; Jan 23 2018 3:48 PM EST      Plan   Abnormal uterine bleeding (AUB)    · Follow-up visit in 3 months Evaluation and Treatment  Follow-up  Status: Hold For -    Scheduling  Requested for: [id number]  Ordered; For: Abnormal uterine bleeding (AUB); Ordered By: Claudette Browning  Performed:       Due: 40HFD0756    Discussion/Summary   healthy adult female Currently, she eats a healthy diet  cervical cancer screening is current Pap test was done today Breast cancer screening: monthly self breast exam was advised, mammogram is current and mammogram has been ordered  Advice and education were given regarding nutrition, aerobic exercise, calcium supplements, vitamin D supplements and sunscreen use  Normal GYN Exam   Stressed   ordered   SMear DOne     For Genetic evaluation with mom with breast CA at under 50   start on the NuvaRing Reassess in 3 months   if any problems develop during the interim  The patient has the current Goals: [employer]  The patent has the current Barriers: None  Patient is able to Self-Care  PATIENT EDUCATION RECORD She was given the following educational materials:  Ideas for a Healthy Life Style  The treatment plan was reviewed with the patient/guardian  The patient/guardian understands and agrees with the treatment plan       Self Referrals: No      Chief Complaint   ANNUAL EXAM  will like to discuss about her cystitis c/o irregular cycles PT also will like to discuss about B/C options      History of Present Illness   HPI: Had been on Depo for 7 years No Periods     Came off a year ago Periods have been irregular     Minimal cramping   Bowel and Bladder habits   Pelvic pain    GYN HM, Adult Female [facility]:  The patient is being seen for a gynecology evaluation  The last health maintenance visit was 1 year(s) ago  General Health: The patient's health since the last visit is described as good  She has regular dental visits  -- She denies vision problems  -- She denies hearing loss  Lifestyle:  She consumes a diverse and healthy diet  -- She does not have any weight concerns  -- She exercises regularly  -- She does not use tobacco -- She denies drug use  Reproductive health: the patient is premenopausal     Screening: cancer screening reviewed and current  metabolic screening reviewed and current  risk screening reviewed and current  Review of Systems        Constitutional: No fever, no chills, feels well, no tiredness, no recent weight gain or loss  ENT: no ear ache, no loss of hearing, no nosebleeds or nasal discharge, no sore throat or hoarseness  Cardiovascular: no complaints of slow or fast heart rate, no chest pain, no palpitations, no leg claudication or lower extremity edema  Respiratory: no complaints of shortness of breath, no wheezing, no dyspnea on exertion, no orthopnea or PND  Breasts: no complaints of breast pain, breast lump or nipple discharge  Gastrointestinal: no complaints of abdominal pain, no constipation, no nausea or diarrhea, no vomiting, no bloody stools  Genitourinary: no complaints of dysuria, no incontinence, no pelvic pain, no dysmenorrhea, no vaginal discharge or abnormal vaginal bleeding  Musculoskeletal: no complaints of arthralgia, no myalgia, no joint swelling or stiffness, no limb pain or swelling  Integumentary: no complaints of skin rash or lesion, no itching or dry skin, no skin wounds  Neurological: no complaints of headache, no confusion, no numbness or tingling, no dizziness or fainting  Active Problems   1  Abdominal pain (789 00) (R10 9)  2  Abnormal ultrasound of thyroid gland (794 5) (R93 8)  3   Abnormal uterine bleeding (AUB) (626  9) (N93 9)  4  Acute sinusitis (461 9) (J01 90)  5  Acute URI (465 9) (J06 9)  6  Anxiety (300 00) (F41 9)  7  Asthma exacerbation (493 92) (J45 901)  8  Breast pain (611 71) (N64 4)  9  Bulging lumbar disc (722 10) (M51 26)  10  Cold intolerance (780 99) (R68 89)  11  Cough variant asthma (493 82) (J45 991)  12  Cyst of right ovary (620 2) (N83 201)  13  Dense breast tissue (793 82) (R92 2)  14  Depo contraception (V25 49) (Z30 40)  15  Dermatitis (692 9) (L30 9)  16  Encounter for lipid screening for cardiovascular disease (V77 91,V81 2) (Z13 220,Z13 6)  17  Encounter for routine gynecological examination (V72 31) (Z01 419)  18  Encounter for screening mammogram for high-risk patient (V76 11) (Z12 31)  19  Enlarged lymph node in neck (785 6) (R59 0)  20  Familial Breast-ovarian Cancer Syndrome (V16 3)  21  Fatigue (780 79) (R53 83)  22  Fever (780 60) (R50 9)  23  Initiation of Depo Provera (V25 02) (Z30 013)  24  Joint pain, knee (719 46) (M25 569)  25  Low back pain (724 2) (M54 5)  26  Lumbago (724 2) (M54 5)  27  Lumbar disc herniation (722 10) (M51 26)  28  Lumbar radiculopathy (724 4) (M54 16)  29  Menorrhagia (626 2) (N92 0)  30  Metrorrhagia (626 6) (N92 1)  31  Moderate persistent asthma (493 90) (J45 40)  32  Neck pain (723 1) (M54 2)  33  Need for diphtheria-tetanus-pertussis (Tdap) vaccine, adult/adolescent (V06 1) (Z23)  34  Need for influenza vaccination (V04 81) (Z23)  35  Pap smear, as part of routine gynecological examination (V76 2) (Z01 419)  36  Paresthesia (782 0) (R20 2)  37  Peripheral neuropathy (356 9) (G62 9)  38  Polyneuropathy (356 9) (G62 9)  39  Right hand paresthesia (782 0) (R20 2)  40  Sciatica of right side (724 3) (M54 31)  41  Screen for STD (sexually transmitted disease) (V74 5) (Z11 3)  42  Seasonal allergies (477 9) (J30 2)  43  Thyroid nodule (241 0) (E04 1)  44  Visit for routine gyn exam (V72 31) (Z01 419)  45  Visit for screening mammogram (V76 12) (Z12 31)  46  Vitamin B12 deficiency (266 2) (E53 8)  47  Vitamin D deficiency (268 9) (E55 9)  48  Vulvovaginitis candida albicans (112 1) (B37 3)    Past Medical History    · Denied: History of substance abuse   · History of tinea cruris (V12 09) (Z86 19)   · History of vaginal discharge (V13 29) (Z87 42)   · Denied: History of Mental health problem   · History of Microscopic hematuria (599 72) (R31 29)   · 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)   · History of Thyroid disorder (246 9) (E07 9)   · History of Vaginal candidiasis (112 1) (B37 3)    Surgical History    · History of Denial Of Any Significant Medical History   · Initiation of Depo Provera (V25 02) (Z30 013)    Family History   Mother    · Family history of essential hypertension (V17 49) (Z82 49)   · Denied: Family history of mental disorder   · Denied: Family history of substance abuse   · FHx: malignant neoplasm of breast (V16 3) (Z80 3)   · Family history of Metastasis from thyroid cancer   · Family history of Ovarian Cancer (V16 41)   · Family history of Type 1 diabetes mellitus with other kidney complication   · Family history of Type 2 diabetes mellitus without complication, without long-term current    use of insulin  Father    · Family history of malignant neoplasm (V16 9) (Z80 9)  Maternal Grandmother    · Family history of hypertension (V17 49) (Z82 49)  Paternal Grandfather    · Family history of malignant neoplasm (V16 9) (Z80 9)  Maternal Aunt    · Family history of Ovarian Cancer (V16 41)    Social History    · Caffeine use (V49 89) (F15 90)   · Completed 12th grade   · Depo contraception (V25 49) (Z30 40)   · Denied: History of drug use   · Lives independently with spouse   ·    · Never A Smoker   · Never Drank Alcohol   · Occupation   · Self-employment   · Uses Safety Equipment - Seatbelts    Current Meds   1  [employer] Allergy Relief 0 025-0 3 % Ophthalmic Solution; INSTILL 1 DROP INTO BOTH     EYES 2 TIMES DAILY;      Therapy: 98CFR5748 to (Last Rx:12May2017)  Requested for: 67YHC5119 Ordered  2  Fluticasone Propionate 50 MCG/ACT Nasal Suspension; USE 2 SPRAYS IN EACH     NOSTRIL ONCE DAILY; Therapy: 48UFC2772 to (Rose Marie Bhakta)  Requested for: 68EUH5980; Last     Rx:07Nov2017 Ordered  3  Loratadine 10 MG Oral Tablet; Take 1 tablet daily; Therapy: 03HWX7339 to (Michelle Castaneda)  Requested for: 05LOR2649; Last     Rx:07Nov2017 Ordered  4  Nystop 335873 UNIT/GM External Powder; apply to affected area twice a day AS     DIRECTED; Therapy: 52Tta8577 to (Teetee Martinez)  Requested for: 98VRZ0824; Last     Rx:12May2017 Ordered  5  Oscal 500/200 D-3 500-200 MG-UNIT Oral Tablet; TAKE 1 TABLET DAILY AS     DIRECTED; Therapy: 91RNN0397 to (Evaluate:48Rnb4736)  Requested for: 42BWU4449; Last     Rx:01Mar2016 Ordered  6  Ventolin  (90 Base) MCG/ACT Inhalation Aerosol Solution; INHALE 1 TO 2     PUFFS EVERY 4 TO 6 HOURS AS NEEDED; Therapy: 23XLV0115 to (Last DR:23DJT2384)  Requested for: 64FUP0138 Ordered  7  Vitamin B-12 1000 MCG Oral Tablet; TAKE 1 TABLET DAILY AS DIRECTED; Therapy: 99LOB0190 to (UXHPXVDK:80YMY4679)  Requested for: 34ECU8818; Last     Rx:18Jan2018 Ordered  8  Vitamin D (Ergocalciferol) 30629 UNIT Oral Capsule; Therapy: 77IPC0727 to Recorded  9  Vitamin D3 88036 UNIT Oral Capsule; Take 1 tab once a week for 3 months; Therapy: 08MNX2787 to (Last Rx:18Jan2018)  Requested for: 44GQS1893 Ordered    Allergies   1  No Known Drug Allergies  2  No Known Environmental Allergies  3  No Known Food Allergies    Vitals    Recorded: 57RXX8196 89:91BN   Systolic 672   Diastolic 64   Height 5 ft 1 in   Weight 118 lb    BMI Calculated 22 3   BSA Calculated 1 51   LMP 43RIH6000     Physical Exam        Constitutional      General appearance: No acute distress, well appearing and well nourished  Neck      Neck: Normal, supple, trachea midline, no masses  Thyroid: Normal, no thyromegaly  Pulmonary      Respiratory effort: No increased work of breathing or signs of respiratory distress  Auscultation of lungs: Clear to auscultation  Cardiovascular      Auscultation of heart: Normal rate and rhythm, normal S1 and S2, no murmurs  Peripheral vascular exam: Normal pulses Throughout  Genitourinary      External genitalia: Normal and no lesions appreciated  Vagina: Normal, no lesions or dryness appreciated  Urethra: Normal        Urethral meatus: Normal        Bladder: Normal, soft, non-tender and no prolapse or masses appreciated  Cervix: Normal, no palpable masses  Uterus: Normal, non-tender, not enlarged, and no palpable masses  Adnexa/parametria: Normal, non-tender and no fullness or masses appreciated  Anus, perineum, and rectum: Normal sphincter tone, no masses, and no prolapse  Chest      Breasts: Normal and no dimpling or skin changes noted  Abdomen      Abdomen: Normal, non-tender, and no organomegaly noted  Liver and spleen: No hepatomegaly or splenomegaly  Examination for hernias: No hernias appreciated  Stool sample for occult blood: Negative  Lymphatic      Palpation of lymph nodes in neck, axillae, groin and/or other locations: No lymphadenopathy or masses noted  Skin      Skin and subcutaneous tissue: Normal skin turgor and no rashes         Palpation of skin and subcutaneous tissue: Normal        Psychiatric      Orientation to person, place, and time: Normal        Mood and affect: Normal        Provider Comments   Provider Comments:    1 child 16 yrs old     [facility]      Future Appointments      Date/Time Provider Specialty Site   01/29/2018 08:30 AM Martha Tolbert MD [address] [place] [address]     Signatures    Electronically signed by : MILDRED Richey ; Jan 23 2018  3:49PM EST                       (Author)

## 2018-01-25 ENCOUNTER — TELEPHONE (OUTPATIENT)
Dept: FAMILY MEDICINE CLINIC | Facility: CLINIC | Age: 43
End: 2018-01-25

## 2018-01-25 NOTE — TELEPHONE ENCOUNTER
NEEDS NEW SCRIPT FOR RIGHT DIAGNOSITC MAMMO WITH 3D AND RIGHT LIMITED ULTRASOUND  DISREGARD ANCILLARY ORDERS IN EPIC SINCE THERE IS NO SIGNATURE    PT HAS AN APPT ON MONDAY

## 2018-01-26 LAB
LAB AP GYN PRIMARY INTERPRETATION: NORMAL
LAB AP LMP: NORMAL
Lab: NORMAL

## 2018-01-29 ENCOUNTER — HOSPITAL ENCOUNTER (OUTPATIENT)
Dept: MAMMOGRAPHY | Facility: CLINIC | Age: 43
Discharge: HOME | End: 2018-01-29
Payer: COMMERCIAL

## 2018-01-29 ENCOUNTER — HOSPITAL ENCOUNTER (OUTPATIENT)
Dept: ULTRASOUND IMAGING | Facility: CLINIC | Age: 43
Discharge: HOME | End: 2018-01-29
Payer: COMMERCIAL

## 2018-01-29 DIAGNOSIS — R92.8 ABNORMAL SCREENING MAMMOGRAM: ICD-10-CM

## 2018-01-29 DIAGNOSIS — R92.8 ABNORMAL MAMMOGRAM: ICD-10-CM

## 2018-01-29 PROCEDURE — G0279 TOMOSYNTHESIS, MAMMO: HCPCS

## 2018-01-29 PROCEDURE — 77065 DX MAMMO INCL CAD UNI: CPT

## 2018-02-15 PROBLEM — N93.9 ABNORMAL UTERINE BLEEDING (AUB): Status: ACTIVE | Noted: 2018-01-16

## 2018-02-15 PROBLEM — Z00.00 HEALTHCARE MAINTENANCE: Status: ACTIVE | Noted: 2018-02-15

## 2018-02-15 PROBLEM — E53.8 VITAMIN B12 DEFICIENCY: Status: ACTIVE | Noted: 2018-01-18

## 2018-02-15 PROBLEM — R31.29 MICROSCOPIC HEMATURIA: Status: ACTIVE | Noted: 2017-06-26

## 2018-02-15 PROBLEM — N83.201 CYST OF RIGHT OVARY: Status: ACTIVE | Noted: 2018-01-16

## 2018-06-27 ENCOUNTER — OFFICE VISIT (OUTPATIENT)
Dept: FAMILY MEDICINE CLINIC | Facility: CLINIC | Age: 43
End: 2018-06-27
Payer: COMMERCIAL

## 2018-06-27 VITALS
HEART RATE: 72 BPM | DIASTOLIC BLOOD PRESSURE: 68 MMHG | WEIGHT: 112.2 LBS | HEIGHT: 64 IN | SYSTOLIC BLOOD PRESSURE: 114 MMHG | RESPIRATION RATE: 14 BRPM | TEMPERATURE: 97.7 F | BODY MASS INDEX: 19.15 KG/M2

## 2018-06-27 DIAGNOSIS — E55.9 VITAMIN D DEFICIENCY: ICD-10-CM

## 2018-06-27 DIAGNOSIS — R93.89 ABNORMAL ULTRASOUND OF THYROID GLAND: ICD-10-CM

## 2018-06-27 DIAGNOSIS — E04.1 THYROID NODULE: Primary | ICD-10-CM

## 2018-06-27 DIAGNOSIS — M53.3 SACROILIAC JOINT DYSFUNCTION OF BOTH SIDES: ICD-10-CM

## 2018-06-27 PROCEDURE — 3725F SCREEN DEPRESSION PERFORMED: CPT | Performed by: FAMILY MEDICINE

## 2018-06-27 PROCEDURE — 99214 OFFICE O/P EST MOD 30 MIN: CPT | Performed by: FAMILY MEDICINE

## 2018-06-27 PROCEDURE — 3008F BODY MASS INDEX DOCD: CPT | Performed by: FAMILY MEDICINE

## 2018-06-27 RX ORDER — OYSTER SHELL CALCIUM WITH VITAMIN D 500; 200 MG/1; [IU]/1
1 TABLET, FILM COATED ORAL DAILY
COMMUNITY
Start: 2013-03-12 | End: 2019-09-13

## 2018-06-27 RX ORDER — MELOXICAM 15 MG/1
15 TABLET ORAL DAILY
Qty: 21 TABLET | Refills: 0 | Status: SHIPPED | OUTPATIENT
Start: 2018-06-27 | End: 2019-09-13

## 2018-06-27 RX ORDER — ALBUTEROL SULFATE 90 UG/1
2 AEROSOL, METERED RESPIRATORY (INHALATION)
COMMUNITY
Start: 2011-12-19 | End: 2022-01-20 | Stop reason: ALTCHOICE

## 2018-06-27 RX ORDER — CYCLOBENZAPRINE HCL 5 MG
1 TABLET ORAL
COMMUNITY
Start: 2014-10-06 | End: 2021-05-13 | Stop reason: ALTCHOICE

## 2018-06-27 RX ORDER — OXYCODONE HCL 5 MG/5 ML
1 SOLUTION, ORAL ORAL
COMMUNITY
Start: 2013-07-12 | End: 2019-09-13

## 2018-06-27 RX ORDER — PREDNISONE 20 MG/1
TABLET ORAL
COMMUNITY
Start: 2017-11-07 | End: 2019-03-16

## 2018-06-27 RX ORDER — TRAMADOL HYDROCHLORIDE 50 MG/1
TABLET ORAL
COMMUNITY
Start: 2014-05-23 | End: 2019-09-13

## 2018-07-11 ENCOUNTER — OFFICE VISIT (OUTPATIENT)
Dept: OBGYN CLINIC | Facility: CLINIC | Age: 43
End: 2018-07-11
Payer: COMMERCIAL

## 2018-07-11 VITALS — SYSTOLIC BLOOD PRESSURE: 100 MMHG | DIASTOLIC BLOOD PRESSURE: 64 MMHG | WEIGHT: 112.6 LBS | BODY MASS INDEX: 19.33 KG/M2

## 2018-07-11 DIAGNOSIS — Z80.3 FAMILY HISTORY OF BREAST CANCER: ICD-10-CM

## 2018-07-11 DIAGNOSIS — Z30.44 ENCOUNTER FOR SURVEILLANCE OF VAGINAL RING HORMONAL CONTRACEPTIVE DEVICE: ICD-10-CM

## 2018-07-11 DIAGNOSIS — Z80.41 FAMILY HISTORY OF OVARIAN CANCER: ICD-10-CM

## 2018-07-11 DIAGNOSIS — Z13.79 GENETIC TESTING: Primary | ICD-10-CM

## 2018-07-11 PROCEDURE — 99214 OFFICE O/P EST MOD 30 MIN: CPT | Performed by: PHYSICIAN ASSISTANT

## 2018-07-11 NOTE — PROGRESS NOTES
Assessment/Plan:    No problem-specific Assessment & Plan notes found for this encounter  Diagnoses and all orders for this visit:    Genetic testing    Encounter for surveillance of vaginal ring hormonal contraceptive device    Family history of ovarian cancer    Family history of breast cancer        Will continue to monitor cycles  Explained that [facility] level would not give us much information currently as patient is still getting a regular period  Call if hot flashes worsen or periods change  Counseled patient on general population vs  family history vs  known genetic mutation cancer risk  Discussed BRCA, Erazo syndrome, and other mutations tested for in [facility] panel  Counseled patient on autosomal dominant nature of these mutations  Explained possible results - positive, negative, and negative with variant of unknown significance  Educated patient on possible management changes and risk reduction strategies if a positive is found  Explained to patient that a negative does not mean that she will never get cancer, only that she is not at increased cancer risk from these specific mutations  Discussed possible need for family member follow-up as well  Explained [facility] breast cancer RiskScore  Given [facility] patient information booklet  Answered all patient's questions  Consent given, and sample obtained  Will be sent to [facility] lab for testing  F/u for results in 4 weeks  Time of visit 30 minutes; >50% spent counseling  Subjective:      Patient ID: Bhargavi Grimaldo is a 44 y o  female  Patient is here for birth control check and genetic testing secondary to family history of breast and ovarian cancer  Patient was prescribed Nuva ring at her yearly GYN exam in January  She used it for 3 months, then stopped  States that she was getting discharge and itching every month around her period  Periods since she stopped Nuva ring have been regular once a month, and bleeding lasts for 3-4 days  Patient is experiencing hot flashes  She denies any change in bowel/bladder habits, pelvic pain, abdominal pain, n/v, and change in appetite  Patient is of /[place] ancestry  She has no personal history of cancer  Patient's mother had breast cancer at age 39, ovarian cancer at age 54, and thyroid cancer at age 58  A maternal aunt had ovarian cancer at age 48  Her father had prostate cancer at age 72  She is currently perimenopausal   She has never used HRT  She has never had a breast biopsy  The following portions of the patient's history were reviewed and updated as appropriate: allergies, current medications, past family history, past medical history, past social history, past surgical history and problem list     Review of Systems   Gastrointestinal: Negative for abdominal distention, abdominal pain, constipation, diarrhea, nausea and vomiting  Genitourinary: Negative  Objective:      /64   Wt 51 1 kg (112 lb 9 6 oz)   LMP 06/10/2018   BMI 19 33 kg/m²          Physical Exam   Constitutional: She is oriented to person, place, and time  Vital signs are normal  She appears well-developed and well-nourished  Neurological: She is alert and oriented to person, place, and time  Psychiatric: She has a normal mood and affect  Her behavior is normal  Judgment and thought content normal    Vitals reviewed

## 2018-08-08 ENCOUNTER — TELEPHONE (OUTPATIENT)
Dept: OBGYN CLINIC | Facility: CLINIC | Age: 43
End: 2018-08-08

## 2018-08-15 ENCOUNTER — TELEPHONE (OUTPATIENT)
Dept: OBGYN CLINIC | Facility: CLINIC | Age: 43
End: 2018-08-15

## 2018-08-17 ENCOUNTER — TELEPHONE (OUTPATIENT)
Dept: OBGYN CLINIC | Facility: CLINIC | Age: 43
End: 2018-08-17

## 2018-08-17 NOTE — TELEPHONE ENCOUNTER
Spoke with patient regarding insurance denial of genetic testing coverage  Explained [facility] and the lab's financial aid options  Gave her the number for their billing services  She states she never received a phone call  Patient to call the lab to discuss options, then let us know her decision regarding proceeding with testing

## 2018-11-07 ENCOUNTER — OFFICE VISIT (OUTPATIENT)
Dept: OBGYN CLINIC | Facility: CLINIC | Age: 43
End: 2018-11-07
Payer: COMMERCIAL

## 2018-11-07 VITALS — SYSTOLIC BLOOD PRESSURE: 118 MMHG | BODY MASS INDEX: 19.95 KG/M2 | DIASTOLIC BLOOD PRESSURE: 74 MMHG | WEIGHT: 116.2 LBS

## 2018-11-07 DIAGNOSIS — N93.9 ABNORMAL UTERINE BLEEDING (AUB): Primary | ICD-10-CM

## 2018-11-07 PROBLEM — Z00.00 ROUTINE ADULT HEALTH MAINTENANCE: Status: RESOLVED | Noted: 2018-02-15 | Resolved: 2018-11-07

## 2018-11-07 PROCEDURE — 99214 OFFICE O/P EST MOD 30 MIN: CPT | Performed by: PHYSICIAN ASSISTANT

## 2018-11-07 NOTE — PROGRESS NOTES
Assessment/Plan:    No problem-specific Assessment & Plan notes found for this encounter  Diagnoses and all orders for this visit:    Abnormal uterine bleeding (AUB)  -     US pelvis complete w transvaginal; Future        Discussed irregular bleeding with patient  Explained that she could be perimenopausal   Stressed the need to go for thyroid blood work as that could be causing irregular cycles  Given slip for pelvic U/S  We will call with results  If normal, can try hormonal contraception  Patient to call if brown discharge occurs again  Stressed the need for her  to have his penis examined by his PCP  F/u to depend on all results  Subjective:      Patient ID: Angel Richrad is a 44 y o  female  Patient is here to discuss starting birth control  States her periods are very irregular, with a cycle interval of 20-30 days  Bleeding is very light and lasts for 3 days  She also complains of moderate cramping in the week prior to her period  Sometimes has spotting after intercourse; states her  "has something on the tip of his penis", but does not know what it is  Experiences occasional dark brown discharge, but no itching, burning, or odor  Symptoms are not present today  She denies any change in bowel/bladder habits, pelvic pain, bloating, abdominal pain, n/v, and change in appetite  Followed by her PCP for a thyroid nodule; currently due for blood work  Pap in January 2018 was negative  Patient was on Nuva ring in the past, but states it gave her mood symptoms and a "rash"  Was on Depo Provera previously and did very well  The following portions of the patient's history were reviewed and updated as appropriate: allergies, current medications, past family history, past medical history, past social history, past surgical history and problem list     Review of Systems   Constitutional: Negative for appetite change     Gastrointestinal: Negative for abdominal distention, abdominal pain, constipation, diarrhea, nausea and vomiting  Genitourinary: Positive for menstrual problem (Irregular periods)  Negative for difficulty urinating, dysuria, frequency, genital sores, hematuria, pelvic pain, urgency, vaginal bleeding, vaginal discharge and vaginal pain  Objective:      /74   Wt 52 7 kg (116 lb 3 2 oz)   LMP 11/02/2018   BMI 19 95 kg/m²          Physical Exam   Constitutional: She is oriented to person, place, and time  Vital signs are normal  She appears well-developed and well-nourished  Neurological: She is alert and oriented to person, place, and time  Psychiatric: She has a normal mood and affect  Her behavior is normal  Judgment and thought content normal    Vitals reviewed

## 2018-11-26 ENCOUNTER — TELEPHONE (OUTPATIENT)
Dept: OBGYN CLINIC | Facility: CLINIC | Age: 43
End: 2018-11-26

## 2018-11-26 NOTE — TELEPHONE ENCOUNTER
Patient is calling with questions about starting DEPO   Best number to call back to would be [phone]

## 2018-11-27 ENCOUNTER — TELEPHONE (OUTPATIENT)
Dept: OBGYN CLINIC | Facility: CLINIC | Age: 43
End: 2018-11-27

## 2018-11-27 NOTE — TELEPHONE ENCOUNTER
Spoke with patient regarding menstrual cycles  States she was mistaken at her last visit regarding bleeding  Currently on day 5 of her period  When she went back and checked her calendar for last month, it turns out the bleeding she had after intercourse was actually her period  Would like to go back on Depo Provera  Recommended patient to still have pelvic U/S and recheck thyroid for irregular cycles  If normal, can restart Depo  Patient going for labs today and U/S tomorrow  She is to call on 11/29 so we can send rx to pharmacy  F/u appointment on 11/30

## 2018-11-28 ENCOUNTER — HOSPITAL ENCOUNTER (OUTPATIENT)
Dept: RADIOLOGY | Facility: HOSPITAL | Age: 43
Discharge: HOME | End: 2018-11-28
Payer: COMMERCIAL

## 2018-11-28 DIAGNOSIS — N93.9 ABNORMAL UTERINE BLEEDING (AUB): ICD-10-CM

## 2018-11-28 PROCEDURE — 76830 TRANSVAGINAL US NON-OB: CPT

## 2018-11-28 PROCEDURE — 76856 US EXAM PELVIC COMPLETE: CPT

## 2018-11-29 ENCOUNTER — TELEPHONE (OUTPATIENT)
Dept: OBGYN CLINIC | Facility: CLINIC | Age: 43
End: 2018-11-29

## 2018-11-29 ENCOUNTER — APPOINTMENT (OUTPATIENT)
Dept: LAB | Facility: CLINIC | Age: 43
End: 2018-11-29
Payer: COMMERCIAL

## 2018-11-29 ENCOUNTER — TRANSCRIBE ORDERS (OUTPATIENT)
Dept: LAB | Facility: CLINIC | Age: 43
End: 2018-11-29

## 2018-11-29 DIAGNOSIS — Z30.9 ENCOUNTER FOR CONTRACEPTIVE MANAGEMENT, UNSPECIFIED TYPE: Primary | ICD-10-CM

## 2018-11-29 DIAGNOSIS — R93.89 ABNORMAL ULTRASOUND OF THYROID GLAND: ICD-10-CM

## 2018-11-29 DIAGNOSIS — E04.1 THYROID NODULE: ICD-10-CM

## 2018-11-29 DIAGNOSIS — E55.9 VITAMIN D DEFICIENCY: ICD-10-CM

## 2018-11-29 LAB — TSH SERPL DL<=0.05 MIU/L-ACNC: 1.46 UIU/ML (ref 0.36–3.74)

## 2018-11-29 PROCEDURE — 82652 VIT D 1 25-DIHYDROXY: CPT

## 2018-11-29 PROCEDURE — 36415 COLL VENOUS BLD VENIPUNCTURE: CPT

## 2018-11-29 PROCEDURE — 84443 ASSAY THYROID STIM HORMONE: CPT

## 2018-11-29 RX ORDER — MEDROXYPROGESTERONE ACETATE 150 MG/ML
150 INJECTION, SUSPENSION INTRAMUSCULAR
Qty: 1 ML | Refills: 0 | Status: SHIPPED | OUTPATIENT
Start: 2018-11-29 | End: 2018-11-30 | Stop reason: SDUPTHER

## 2018-11-29 NOTE — TELEPHONE ENCOUNTER
Spoke with patient regarding thyroid labs and pelvic U/S   TSH WNL  Patient had pelvic U/S last night; have not received report yet  She is due for f/u in our office tomorrow  Would like to go back on Depo Provera injection for at least 6 months  States she was on it in the past, and is most comfortable with it  Rx for one dose sent in  Will be administered tomorrow morning

## 2018-11-30 ENCOUNTER — OFFICE VISIT (OUTPATIENT)
Dept: OBGYN CLINIC | Facility: CLINIC | Age: 43
End: 2018-11-30
Payer: COMMERCIAL

## 2018-11-30 VITALS
BODY MASS INDEX: 20.32 KG/M2 | HEIGHT: 64 IN | DIASTOLIC BLOOD PRESSURE: 60 MMHG | SYSTOLIC BLOOD PRESSURE: 102 MMHG | WEIGHT: 119 LBS

## 2018-11-30 DIAGNOSIS — Z30.42 DEPO-PROVERA CONTRACEPTIVE STATUS: ICD-10-CM

## 2018-11-30 DIAGNOSIS — Z30.013 ENCOUNTER FOR INITIAL PRESCRIPTION OF INJECTABLE CONTRACEPTIVE: Primary | ICD-10-CM

## 2018-11-30 PROBLEM — N93.9 ABNORMAL UTERINE BLEEDING (AUB): Status: RESOLVED | Noted: 2018-01-16 | Resolved: 2018-11-30

## 2018-11-30 PROBLEM — N83.201 CYST OF RIGHT OVARY: Status: RESOLVED | Noted: 2018-01-16 | Resolved: 2018-11-30

## 2018-11-30 LAB
1,25(OH)2D3 SERPL-MCNC: 80.9 PG/ML (ref 19.9–79.3)
SL AMB POCT URINE HCG: NEGATIVE

## 2018-11-30 PROCEDURE — 99213 OFFICE O/P EST LOW 20 MIN: CPT | Performed by: PHYSICIAN ASSISTANT

## 2018-11-30 PROCEDURE — 81025 URINE PREGNANCY TEST: CPT | Performed by: PHYSICIAN ASSISTANT

## 2018-11-30 PROCEDURE — 96372 THER/PROPH/DIAG INJ SC/IM: CPT | Performed by: PHYSICIAN ASSISTANT

## 2018-11-30 RX ORDER — MEDROXYPROGESTERONE ACETATE 150 MG/ML
150 INJECTION, SUSPENSION INTRAMUSCULAR
Qty: 1 ML | Refills: 1 | Status: SHIPPED | OUTPATIENT
Start: 2018-11-30 | End: 2019-09-13

## 2018-11-30 RX ORDER — MEDROXYPROGESTERONE ACETATE 150 MG/ML
150 INJECTION, SUSPENSION INTRAMUSCULAR ONCE
Status: COMPLETED | OUTPATIENT
Start: 2018-11-30 | End: 2018-11-30

## 2018-11-30 RX ADMIN — MEDROXYPROGESTERONE ACETATE 150 MG: 150 INJECTION, SUSPENSION INTRAMUSCULAR at 08:08

## 2018-11-30 NOTE — PROGRESS NOTES
Assessment/Plan:    No problem-specific Assessment & Plan notes found for this encounter  Diagnoses and all orders for this visit:    Encounter for initial prescription of injectable contraceptive  -     medroxyPROGESTERone (DEPO-PROVERA) 150 mg/mL injection; Inject 1 mL (150 mg total) into a muscle every 3 (three) months    Depo-Provera contraceptive status  -     POCT urine HCG  -     medroxyPROGESTERone (DEPO-PROVERA) IM injection 150 mg; Inject 1 mL (150 mg total) into a muscle once         UPT negative  Pelvic U/S on 11/28/18 showed possible adenomyosis  Ovaries WNL  Discussed contraception with patient  Explained that there is slightly increased risk for blood clot and stroke in women over the age of 28  Depo Provera has been associated with reversible premature bone density loss  Recommended starting calcium supplementation  Patient received first dose of Depo Provera today  Will stay on medication for 6-12 months, then re-evaluate  She is to call with any problems  Refills sent to pharmacy  F/u in 12 weeks for next dose  Time of visit 15 minutes;  >50% spent counseling  Subjective:      Patient ID: Raymundo Mejia is a 44 y o  female  Patient is here to discuss test results as well as to discuss starting Depo Provera injection  Last few periods have been regular  LMP is 11/23/18  Patient though she had bleeding after intercourse last month, but when she checked her calendar, it was actually her menstrual   Recent thyroid labs were WNL  Would like to go back on Depo Provera injection as she did well on it in the past   Has tried Nuva ring before, but it kept giving her increased discharge  She denies any urinary symptoms, pelvic pain, abdominal pain, n/v, and appetite changes  Had pelvic U/S on 11/28/18  Patient is a non-smoker  No current heart disease or HTN          The following portions of the patient's history were reviewed and updated as appropriate: allergies, current medications, past family history, past medical history, past social history, past surgical history and problem list     Review of Systems   Constitutional: Negative for appetite change  Gastrointestinal: Negative for abdominal distention, abdominal pain, nausea and vomiting  Genitourinary: Negative for difficulty urinating, dysuria, frequency, genital sores, hematuria, menstrual problem, pelvic pain, urgency, vaginal bleeding, vaginal discharge and vaginal pain  Objective:      /60 (BP Location: Right arm, Patient Position: Sitting, Cuff Size: Standard)   Ht 5' 4" (1 626 m)   Wt 54 kg (119 lb)   LMP 11/02/2018   BMI 20 43 kg/m²          Physical Exam   Constitutional: She is oriented to person, place, and time  Vital signs are normal  She appears well-developed and well-nourished  Neurological: She is alert and oriented to person, place, and time  Psychiatric: She has a normal mood and affect  Her behavior is normal  Judgment and thought content normal    Vitals reviewed

## 2019-02-22 ENCOUNTER — CLINICAL SUPPORT (OUTPATIENT)
Dept: OBGYN CLINIC | Facility: CLINIC | Age: 44
End: 2019-02-22

## 2019-02-22 VITALS — WEIGHT: 117.4 LBS | DIASTOLIC BLOOD PRESSURE: 70 MMHG | SYSTOLIC BLOOD PRESSURE: 110 MMHG | BODY MASS INDEX: 20.15 KG/M2

## 2019-02-22 DIAGNOSIS — Z30.42 ENCOUNTER FOR SURVEILLANCE OF INJECTABLE CONTRACEPTIVE: ICD-10-CM

## 2019-02-22 PROCEDURE — 96372 THER/PROPH/DIAG INJ SC/IM: CPT

## 2019-02-22 RX ORDER — MEDROXYPROGESTERONE ACETATE 150 MG/ML
150 INJECTION, SUSPENSION INTRAMUSCULAR ONCE
Status: COMPLETED | OUTPATIENT
Start: 2019-02-22 | End: 2019-02-22

## 2019-02-22 RX ADMIN — MEDROXYPROGESTERONE ACETATE 150 MG: 150 INJECTION, SUSPENSION INTRAMUSCULAR at 09:12

## 2019-03-16 ENCOUNTER — APPOINTMENT (EMERGENCY)
Dept: RADIOLOGY | Facility: HOSPITAL | Age: 44
End: 2019-03-16

## 2019-03-16 ENCOUNTER — HOSPITAL ENCOUNTER (EMERGENCY)
Facility: HOSPITAL | Age: 44
Discharge: HOME | End: 2019-03-16
Attending: EMERGENCY MEDICINE | Admitting: EMERGENCY MEDICINE

## 2019-03-16 VITALS
DIASTOLIC BLOOD PRESSURE: 72 MMHG | RESPIRATION RATE: 16 BRPM | WEIGHT: 116.84 LBS | HEART RATE: 92 BPM | OXYGEN SATURATION: 99 % | TEMPERATURE: 98.5 F | SYSTOLIC BLOOD PRESSURE: 117 MMHG | BODY MASS INDEX: 20.06 KG/M2

## 2019-03-16 DIAGNOSIS — N89.8 VAGINAL DISCHARGE: Primary | ICD-10-CM

## 2019-03-16 LAB
BACTERIA UR QL AUTO: ABNORMAL /HPF
BILIRUB UR QL STRIP: NEGATIVE
CLARITY UR: CLEAR
COLOR UR: YELLOW
EXT PREG TEST URINE: NEGATIVE
GLUCOSE UR STRIP-MCNC: NEGATIVE MG/DL
HGB UR QL STRIP.AUTO: ABNORMAL
HYALINE CASTS #/AREA URNS LPF: ABNORMAL /LPF
KETONES UR STRIP-MCNC: NEGATIVE MG/DL
LEUKOCYTE ESTERASE UR QL STRIP: ABNORMAL
NITRITE UR QL STRIP: NEGATIVE
NON-SQ EPI CELLS URNS QL MICRO: ABNORMAL /HPF
PH UR STRIP.AUTO: 7 [PH] (ref 4.5–8)
PROT UR STRIP-MCNC: NEGATIVE MG/DL
RBC #/AREA URNS AUTO: ABNORMAL /HPF
SP GR UR STRIP.AUTO: 1.02 (ref 1–1.03)
UROBILINOGEN UR QL STRIP.AUTO: 0.2 E.U./DL
WBC #/AREA URNS AUTO: ABNORMAL /HPF

## 2019-03-16 PROCEDURE — 76830 TRANSVAGINAL US NON-OB: CPT

## 2019-03-16 PROCEDURE — 81001 URINALYSIS AUTO W/SCOPE: CPT

## 2019-03-16 PROCEDURE — 76856 US EXAM PELVIC COMPLETE: CPT

## 2019-03-16 PROCEDURE — 81003 URINALYSIS AUTO W/O SCOPE: CPT

## 2019-03-16 PROCEDURE — 87491 CHLMYD TRACH DNA AMP PROBE: CPT | Performed by: EMERGENCY MEDICINE

## 2019-03-16 PROCEDURE — 87086 URINE CULTURE/COLONY COUNT: CPT

## 2019-03-16 PROCEDURE — 99284 EMERGENCY DEPT VISIT MOD MDM: CPT

## 2019-03-16 PROCEDURE — 86592 SYPHILIS TEST NON-TREP QUAL: CPT | Performed by: EMERGENCY MEDICINE

## 2019-03-16 PROCEDURE — 81025 URINE PREGNANCY TEST: CPT | Performed by: EMERGENCY MEDICINE

## 2019-03-16 PROCEDURE — 36415 COLL VENOUS BLD VENIPUNCTURE: CPT | Performed by: EMERGENCY MEDICINE

## 2019-03-16 PROCEDURE — 87591 N.GONORRHOEAE DNA AMP PROB: CPT | Performed by: EMERGENCY MEDICINE

## 2019-03-16 RX ORDER — CLOTRIMAZOLE 1 %
1 CREAM WITH APPLICATOR VAGINAL
Qty: 1 G | Refills: 0 | Status: SHIPPED | OUTPATIENT
Start: 2019-03-16 | End: 2019-03-23

## 2019-03-16 NOTE — ED ATTENDING ATTESTATION
Karthikeyan Foote MD, saw and evaluated the patient  I have discussed the patient with the resident/non-physician practitioner and agree with the resident's/non-physician practitioner's findings, Plan of Care, and MDM as documented in the resident's/non-physician practitioner's note, except where noted  All available labs and Radiology studies were reviewed  I was present for key portions of any procedure(s) performed by the resident/non-physician practitioner and I was immediately available to provide assistance  At this point I agree with the current assessment done in the Emergency Department    I have conducted an independent evaluation of this patient a history and physical is as follows:  Vag d/c for 2 months    Mild abd pain  Right sided   For several weeks   Mild dysparuneia and bleeding with intercourse   Sex partner has blistering genital lesions       Exam anicteric   Lungs clear heart rrr no m abd soft pos bs  Ext normal    Pelvic  No cmt or mass no lesions      Mild white d/c  Imp  R/o std  Ovarian cyst   Std testing    Critical Care Time  Procedures

## 2019-03-16 NOTE — ED PROVIDER NOTES
ASSESSMENT AND PLAN    Raymundo Mejia is a 44 y o  female who presents for evaluation of vaginal discharge, the setting of a sexual partner who has recently developed blisters on his penis, as well as right-sided abdominal pain  On arrival, the patient is hemodynamically stable, well-appearing, without acute distress, and with a nontoxic appearance  Her physical exam is largely unremarkable  Pelvic exam was performed, which did display thick white discharge, concerning for possible yeast infection  There are no lesions noted, there is no cervical motion tenderness  -urinalysis appears contaminated, not consistent with urinary tract infection especially concerning the patient's lack of urinary symptoms  -urine pregnancy negative  -GC/chlamydia pending, and currently pending  RPR also currently pending   -on re-evaluation, the patient's physical exam is unchanged  She remains well appearing  She requests discharge home  -will start topical vaginal clotrimazole for treatment of suspected yeast infection  --pelvic ultrasound negative for abnormality  Unfortunately, the patient requested discharge prior to the results, and this specific result was not discussed with her  -plan for discharge as noted above  Strict return precautions provided  The patient will follow up with her primary care physician on Monday  Advised the patient to abstain from sexual intercourse until her sexual partner is tested for STI  The patient endorses understanding  History  Chief Complaint   Patient presents with    Abdominal Pain     Pt states that she has been fever and abdominal pain for a couple of days  Pt has been having white vaginal discharge for a couple months    Vaginal Discharge     This is a 50-year-old female who presents for evaluation of vaginal discharge  The patient states that this has been going on for several months    She describes the discharge as generally white, and creamy, and has been progressively increasing in volume since onset  The patient is sexually active with 1 partner, and rarely uses protection  The patient does note that her partner has over the last several weeks developed achiness lesion, described as a blister, also described as painless  The patient states that she is concerned that he may have transmitted a disease to her  Patient also endorses occasional intermittent abdominal pain, described as right-sided  This has been going on for several days  She has never had abdominal pain like this before  She denies any fevers, chills, chest pain, shortness of breath, nausea, vomiting, diarrhea, dysuria, urinary frequency, urinary urgency  She has no recent travel, recent surgeries, recent hospitalizations, recent sick contacts  She does not describe any dyspareunia, but does note that she has had increased vaginal bleeding after sexual intercourse over the last several days  Prior to Admission Medications   Prescriptions Last Dose Informant Patient Reported? Taking? Misc   Devices (J & J RESCUE BLANKET) MISC  Self Yes Yes   Sig: by Does not apply route   albuterol (5 mg/mL) 0 5 % nebulizer solution  Self Yes No   Sig: Take 2 5 mg by nebulization every 6 (six) hours as needed for wheezing   albuterol (VENTOLIN HFA) 90 mcg/act inhaler   Yes No   Sig: Inhale 2 puffs   calcium-vitamin D (OSCAL 500/200 D-3) 500 mg-200 units per tablet   Yes No   Sig: Take 1 tablet by mouth daily   cyclobenzaprine (FLEXERIL) 5 mg tablet   Yes No   Sig: Take 1 tablet by mouth   fluticasone (FLONASE) 50 mcg/act nasal spray  Self Yes No   Si spray into each nostril daily   loratadine (CLARITIN) 10 mg tablet  Self Yes Yes   Sig: Take 10 mg by mouth daily   medroxyPROGESTERone (DEPO-PROVERA) 150 mg/mL injection   No Yes   Sig: Inject 1 mL (150 mg total) into a muscle every 3 (three) months   meloxicam (MOBIC) 15 mg tablet   No Yes   Sig: Take 1 tablet (15 mg total) by mouth daily   montelukast (SINGULAIR) 10 mg tablet  Self Yes Yes   Sig: Take 10 mg by mouth as needed   nystatin (MYCOSTATIN) powder  Self Yes Yes   Sig: Apply topically 4 (four) times a day   oxyCODONE (ROXICODONE) 5 mg/5 mL solution   Yes Yes   Sig: Take 1 tablet by mouth   traMADol (ULTRAM) 50 mg tablet   Yes Yes   Sig: Take by mouth      Facility-Administered Medications: None       Past Medical History:   Diagnosis Date    Asthma     Back pain     History of thyroid disorder     History of tinea cruris     History of vaginal discharge     Microscopic hematuria     History    Papilloma of tongue     History    Vaginal candidiasis     History       History reviewed  No pertinent surgical history  Family History   Problem Relation Age of Onset    Hypertension Mother     Mental illness Mother         Disorder    Substance Abuse Mother     Breast cancer Mother 27    Thyroid cancer Mother         Metastasis from thyroid cancer    Ovarian cancer Mother 48    Diabetes type I Mother         mellitus with other kidney complication    Diabetes type II Mother         without complication, without long term current use of insulin     Cancer Father     Hypertension Maternal Grandmother     Cancer Paternal Grandfather     Ovarian cancer Maternal Aunt      I have reviewed and agree with the history as documented  Social History     Tobacco Use    Smoking status: Never Smoker    Smokeless tobacco: Never Used   Substance Use Topics    Alcohol use: No    Drug use: No        Review of Systems   Constitutional: Negative for chills and fever  HENT: Negative for congestion and rhinorrhea  Eyes: Negative for photophobia and visual disturbance  Respiratory: Negative for cough and shortness of breath  Cardiovascular: Negative for chest pain and palpitations  Gastrointestinal: Negative for abdominal pain, diarrhea, nausea and vomiting  Genitourinary: Positive for vaginal bleeding and vaginal discharge   Negative for dysuria, frequency and vaginal pain  Musculoskeletal: Negative for neck pain and neck stiffness  Skin: Negative for pallor and rash  Neurological: Negative for light-headedness and headaches  All other systems reviewed and are negative  Physical Exam  ED Triage Vitals [03/16/19 1302]   Temperature Pulse Respirations Blood Pressure SpO2   98 5 °F (36 9 °C) 90 18 147/81 99 %      Temp Source Heart Rate Source Patient Position - Orthostatic VS BP Location FiO2 (%)   Oral Monitor Sitting Left arm --      Pain Score       2           qSOFA     Row Name 03/16/19 1445 03/16/19 1420 03/16/19 1302          Altered mental status GCS < 15  --  --  --      Respiratory Rate > / =22  0  0  0      Systolic BP < / =082  0  0  0      Q Sofa Score  0  0  0            Orthostatic Vital Signs  Vitals:    03/16/19 1302 03/16/19 1420 03/16/19 1445   BP: 147/81 137/76 117/72   Pulse: 90 100 92   Patient Position - Orthostatic VS: Sitting         Physical Exam   Constitutional: She is oriented to person, place, and time  Resting comfortably on stretcher  Awake alert, well-appearing, no acute distress  Nontoxic in appearance  Appears stated age   HENT:   Head: Normocephalic and atraumatic  Mouth/Throat: Oropharynx is clear and moist  No oropharyngeal exudate  Eyes: Pupils are equal, round, and reactive to light  No scleral icterus  Neck: Normal range of motion  No JVD present  Cardiovascular: Normal rate, regular rhythm and normal heart sounds  No murmur heard  Pulmonary/Chest: Effort normal  No respiratory distress  She has no wheezes  She has no rales  Abdominal: Soft  She exhibits no distension  There is no tenderness  Genitourinary:   Genitourinary Comments: External vagina without lesions, or discharge  There is no tenderness  Bimanual examination negative for masses, cervical motion tenderness, or adnexal tenderness bilaterally  Musculoskeletal: Normal range of motion  She exhibits no edema  Neurological: She is alert and oriented to person, place, and time  She exhibits normal muscle tone  Skin: Skin is warm and dry  No rash noted  No pallor  ED Medications  Medications - No data to display    Diagnostic Studies  Results Reviewed     Procedure Component Value Units Date/Time    Urine Microscopic [[id number]]  (Abnormal) Collected:  03/16/19 1359    Lab Status:  Final result Specimen:  Urine, Clean Catch Updated:  03/16/19 1441     RBC, UA 2-4 /hpf      WBC, UA 20-30 /hpf      Epithelial Cells Occasional /hpf      Bacteria, UA Occasional /hpf      Hyaline Casts, UA None Seen /lpf     Urine culture [[id number]] Collected:  03/16/19 1359    Lab Status: In process Specimen:  Urine, Clean Catch Updated:  03/16/19 1441    RPR [[id number]] Collected:  03/16/19 1418    Lab Status: In process Specimen:  Blood from Arm, Right Updated:  03/16/19 1434    [address] [place] [address] amplified DNA by PCR [[id number]] Collected:  03/16/19 1415    Lab Status: In process Specimen:  Cervix Updated:  03/16/19 1434    POCT pregnancy, urine [[id number]]  (Normal) Resulted:  03/16/19 1414    Lab Status:  Final result Updated:  03/16/19 1414     EXT PREG TEST UR (Ref: Negative) negative    ED Urine Macroscopic [[id number]]  (Abnormal) Collected:  03/16/19 1359    Lab Status:  Final result Specimen:  Urine Updated:  03/16/19 1359     Color, UA Yellow     Clarity, UA Clear     pH, UA 7 0     Leukocytes, UA Moderate     Nitrite, UA Negative     Protein, UA Negative mg/dl      Glucose, UA Negative mg/dl      Ketones, UA Negative mg/dl      Urobilinogen, UA 0 2 E U /dl      Bilirubin, UA Negative     Blood, UA Trace     Specific Verbena, UA 1 020    Narrative:       CLINITEK RESULT                 US pelvis complete w transvaginal   Final Result by Francy Gunderson MD (03/16 1721)       No significant abnormality                        Workstation performed: [id number]               Procedures  Procedures      Phone Consults  ED Phone Contact    ED Course                               MDM    Disposition  Final diagnoses:   Vaginal discharge     Time reflects when diagnosis was documented in both MDM as applicable and the Disposition within this note     Time User Action Codes Description Comment    3/16/2019  5:02 PM [facility], [facility] [facility] [N89 8] Vaginal discharge       ED Disposition     ED Disposition Condition Date/Time Comment    Discharge Stable Sat Mar 16, 2019  5:03 PM Rabago Wallback discharge to home/self care  Follow-up Information    None         Discharge Medication List as of 3/16/2019  5:06 PM      START taking these medications    Details   clotrimazole (GYNE-LOTRIMIN) 1 % vaginal cream Insert 1 applicator into the vagina daily at bedtime for 7 days, Starting Sat 3/16/2019, Until Sat 3/23/2019, Print         CONTINUE these medications which have NOT CHANGED    Details   loratadine (CLARITIN) 10 mg tablet Take 10 mg by mouth daily, Historical Med      medroxyPROGESTERone (DEPO-PROVERA) 150 mg/mL injection Inject 1 mL (150 mg total) into a muscle every 3 (three) months, Starting Fri 11/30/2018, Normal      meloxicam (MOBIC) 15 mg tablet Take 1 tablet (15 mg total) by mouth daily, Starting Wed 6/27/2018, Normal      Misc   Devices (J & J RESCUE BLANKET) MISC by Does not apply route, Historical Med      montelukast (SINGULAIR) 10 mg tablet Take 10 mg by mouth as needed, Historical Med      nystatin (MYCOSTATIN) powder Apply topically 4 (four) times a day, Historical Med      oxyCODONE (ROXICODONE) 5 mg/5 mL solution Take 1 tablet by mouth, Starting Fri 7/12/2013, Historical Med      traMADol (ULTRAM) 50 mg tablet Take by mouth, Starting Fri 5/23/2014, Historical Med      albuterol (5 mg/mL) 0 5 % nebulizer solution Take 2 5 mg by nebulization every 6 (six) hours as needed for wheezing, Historical Med      albuterol (VENTOLIN HFA) 90 mcg/act inhaler Inhale 2 puffs, Starting Mon 12/19/2011, Historical Med      calcium-vitamin D (OSCAL 500/200 D-3) 500 mg-200 units per tablet Take 1 tablet by mouth daily, Starting Tue 3/12/2013, Historical Med      cyclobenzaprine (FLEXERIL) 5 mg tablet Take 1 tablet by mouth, Starting Mon 10/6/2014, Historical Med      fluticasone (FLONASE) 50 mcg/act nasal spray 1 spray into each nostril daily, Historical Med           No discharge procedures on file  ED Provider  Attending physically available and evaluated Yassine Pillai I managed the patient along with the ED Attending      Electronically Signed by         Neal Luna MD  03/16/19 2025

## 2019-03-17 LAB — BACTERIA UR CULT: NORMAL

## 2019-03-18 LAB
C TRACH DNA SPEC QL NAA+PROBE: POSITIVE
N GONORRHOEA DNA SPEC QL NAA+PROBE: NEGATIVE
RPR SER QL: NORMAL

## 2019-03-18 RX ORDER — AZITHROMYCIN 250 MG/1
TABLET, FILM COATED ORAL
Qty: 4 TABLET | Refills: 0 | Status: SHIPPED | OUTPATIENT
Start: 2019-03-18 | End: 2019-03-18

## 2019-03-18 NOTE — RESULT ENCOUNTER NOTE
Patient made aware that she is positive for chlamydia  1000 mg of Zithromax was sent electronically to her pharmacy to take it 1 time  She was told to advise her partner that she has chlamydia because they need to be treated as well  I told patient to withhold from sexual intercourse for at least a month

## 2019-03-19 ENCOUNTER — TELEPHONE (OUTPATIENT)
Dept: OBGYN CLINIC | Facility: CLINIC | Age: 44
End: 2019-03-19

## 2019-03-19 DIAGNOSIS — A74.9 CHLAMYDIA INFECTION: Primary | ICD-10-CM

## 2019-03-19 RX ORDER — AZITHROMYCIN 500 MG/1
TABLET, FILM COATED ORAL
Qty: 2 TABLET | Refills: 0 | Status: SHIPPED | OUTPATIENT
Start: 2019-03-19 | End: 2019-03-19

## 2019-03-19 NOTE — TELEPHONE ENCOUNTER
Patient called to discuss recently diagnosed chlamydial infection  Was diagnosed in the ED, and had azithromycin sent to [facility]   Patient is currently without insurance and needs rx sent to [facility]  Rx for azithromycin 1 g once sent to pharmacy  Explained that infection is sexually transmitted, and it is unlikely her partner has had for years with no symptoms  He is being treated  No intercourse until ZOEY is negative  She will schedule culture for 7-10 days out

## 2019-03-20 ENCOUNTER — TELEPHONE (OUTPATIENT)
Dept: OBGYN CLINIC | Facility: CLINIC | Age: 44
End: 2019-03-20

## 2019-03-20 NOTE — TELEPHONE ENCOUNTER
Spoke with patient regarding other testing done in the ED  RPR was negative, as was urine culture  Patient states she was told herpes testing was not necessary as she did not have any lesions  F/u as planned for chlamydial reculture on 3/29

## 2019-03-24 ENCOUNTER — OFFICE VISIT (OUTPATIENT)
Dept: URGENT CARE | Age: 44
End: 2019-03-24

## 2019-03-24 VITALS
BODY MASS INDEX: 19.97 KG/M2 | DIASTOLIC BLOOD PRESSURE: 60 MMHG | SYSTOLIC BLOOD PRESSURE: 110 MMHG | OXYGEN SATURATION: 99 % | TEMPERATURE: 98 F | HEIGHT: 64 IN | WEIGHT: 117 LBS | RESPIRATION RATE: 20 BRPM | HEART RATE: 80 BPM

## 2019-03-24 DIAGNOSIS — H61.23 BILATERAL IMPACTED CERUMEN: Primary | ICD-10-CM

## 2019-03-24 PROCEDURE — G0382 LEV 3 HOSP TYPE B ED VISIT: HCPCS | Performed by: FAMILY MEDICINE

## 2019-03-24 NOTE — PATIENT INSTRUCTIONS
Use Debrox as directed    Try flushing her ears at home with a home kit  Get rechecked if symptoms are persisting  Recheck if you have any purulent drainage from the nose and sinus

## 2019-03-24 NOTE — PROGRESS NOTES
[facility]        NAME: Mary Elam is a 44 y o  female  : 1975    MRN: [id number]  DATE: 2019  TIME: 2:30 PM    Assessment and Plan   Bilateral impacted cerumen [H61 23]  1  Bilateral impacted cerumen           Patient Instructions       Follow up with PCP in 3-5 days  Proceed to  ER if symptoms worsen  Chief Complaint     Chief Complaint   Patient presents with    Sinus Problem     symptoms started a "couple weeks already" and they are not going away   Earache         History of Present Illness       Patient for evaluation of ear pressure right greater than left  Has been having some congestion and runny nose which is been mostly clear  She denies any fevers, chills, body aches  Review of Systems   Review of Systems   Constitutional: Negative  HENT: Positive for congestion and rhinorrhea  Negative for ear discharge, postnasal drip, sinus pressure, sneezing, sore throat and trouble swallowing  Eyes: Negative  Respiratory: Negative  Cardiovascular: Negative  Current Medications       Current Outpatient Medications:     albuterol (5 mg/mL) 0 5 % nebulizer solution, Take 2 5 mg by nebulization every 6 (six) hours as needed for wheezing, Disp: , Rfl:     albuterol (VENTOLIN HFA) 90 mcg/act inhaler, Inhale 2 puffs, Disp: , Rfl:     calcium-vitamin D (OSCAL 500/200 D-3) 500 mg-200 units per tablet, Take 1 tablet by mouth daily, Disp: , Rfl:     cyclobenzaprine (FLEXERIL) 5 mg tablet, Take 1 tablet by mouth, Disp: , Rfl:     fluticasone (FLONASE) 50 mcg/act nasal spray, 1 spray into each nostril daily, Disp: , Rfl:     loratadine (CLARITIN) 10 mg tablet, Take 10 mg by mouth daily, Disp: , Rfl:     medroxyPROGESTERone (DEPO-PROVERA) 150 mg/mL injection, Inject 1 mL (150 mg total) into a muscle every 3 (three) months, Disp: 1 mL, Rfl: 1    meloxicam (MOBIC) 15 mg tablet, Take 1 tablet (15 mg total) by mouth daily, Disp: 21 tablet, Rfl: 0    Misc  Devices (J & J RESCUE BLANKET) MISC, by Does not apply route, Disp: , Rfl:     montelukast (SINGULAIR) 10 mg tablet, Take 10 mg by mouth as needed, Disp: , Rfl:     nystatin (MYCOSTATIN) powder, Apply topically 4 (four) times a day, Disp: , Rfl:     oxyCODONE (ROXICODONE) 5 mg/5 mL solution, Take 1 tablet by mouth, Disp: , Rfl:     traMADol (ULTRAM) 50 mg tablet, Take by mouth, Disp: , Rfl:     Current Allergies     Allergies as of 03/24/2019    (No Known Allergies)            The following portions of the patient's history were reviewed and updated as appropriate: allergies, current medications, past family history, past medical history, past social history, past surgical history and problem list      Past Medical History:   Diagnosis Date    Asthma     Back pain     History of thyroid disorder     History of tinea cruris     History of vaginal discharge     Microscopic hematuria     History    Papilloma of tongue     History    Vaginal candidiasis     History       No past surgical history on file  Family History   Problem Relation Age of Onset    Hypertension Mother     Mental illness Mother         Disorder    Substance Abuse Mother     Breast cancer Mother 27    Thyroid cancer Mother         Metastasis from thyroid cancer    Ovarian cancer Mother 48    Diabetes type I Mother         mellitus with other kidney complication    Diabetes type II Mother         without complication, without long term current use of insulin     Cancer Father     Hypertension Maternal Grandmother     Cancer Paternal Grandfather     Ovarian cancer Maternal Aunt          Medications have been verified          Objective   /60 (BP Location: Left arm, Patient Position: Sitting, Cuff Size: Standard)   Pulse 80   Temp 98 °F (36 7 °C)   Resp 20   Ht 5' 4" (1 626 m)   Wt 53 1 kg (117 lb)   SpO2 99%   BMI 20 08 kg/m²        Physical Exam     Physical Exam   Constitutional: She is oriented to person, place, and time  She appears well-developed and well-nourished  No distress  HENT:   Head: Normocephalic and atraumatic  Nose: Nose normal    Mouth/Throat: Oropharynx is clear and moist  No oropharyngeal exudate  Unable to visualize both TMs due to impacted cerumen  Cerumen is dry down firm and recommend using Debrox to soften the cerumen over the next week before attempting to flush  Eyes: Pupils are equal, round, and reactive to light  Conjunctivae and EOM are normal    Pulmonary/Chest: Effort normal and breath sounds normal  No respiratory distress  She has no wheezes  She has no rales  Lymphadenopathy:     She has no cervical adenopathy  Neurological: She is alert and oriented to person, place, and time  Skin: Skin is warm and dry  She is not diaphoretic  Psychiatric: She has a normal mood and affect  Her behavior is normal    Nursing note and vitals reviewed

## 2019-03-29 ENCOUNTER — OFFICE VISIT (OUTPATIENT)
Dept: OBGYN CLINIC | Facility: CLINIC | Age: 44
End: 2019-03-29

## 2019-03-29 VITALS
BODY MASS INDEX: 20.52 KG/M2 | WEIGHT: 120.2 LBS | SYSTOLIC BLOOD PRESSURE: 128 MMHG | DIASTOLIC BLOOD PRESSURE: 82 MMHG | HEIGHT: 64 IN

## 2019-03-29 DIAGNOSIS — A74.9 CHLAMYDIA INFECTION: Primary | ICD-10-CM

## 2019-03-29 DIAGNOSIS — Z11.3 SCREENING FOR STD (SEXUALLY TRANSMITTED DISEASE): ICD-10-CM

## 2019-03-29 PROCEDURE — 87591 N.GONORRHOEAE DNA AMP PROB: CPT | Performed by: PHYSICIAN ASSISTANT

## 2019-03-29 PROCEDURE — 99214 OFFICE O/P EST MOD 30 MIN: CPT | Performed by: PHYSICIAN ASSISTANT

## 2019-03-29 PROCEDURE — 87491 CHLMYD TRACH DNA AMP PROBE: CPT | Performed by: PHYSICIAN ASSISTANT

## 2019-03-29 NOTE — PROGRESS NOTES
Assessment/Plan:    No problem-specific Assessment & Plan notes found for this encounter  Diagnoses and all orders for this visit:    Chlamydia infection  -     Chlamydia/GC amplified DNA by PCR    Screening for STD (sexually transmitted disease)  -     HIV 1/2 AG-AB combo        GC/chlamydia cultures done  Order entered for HIV screen  We will call with results  Patient to abstain from intercourse until cultures resulted  Reassured patient that once cultures are negative, no need to retest unless she is having symptoms  Call with any further problems  Subjective:      Patient ID: Moriah Sultana is a 44 y o  female  Patient is here for f/u of chlamydial infection diagnosed in the ED on 3/16  She took her antibiotics, and her partner was treated as well  RPR and urine culture at time of ED visit were negative  Patient is requesting HIV screening  She denies any urinary symptoms, vaginal discharge, odor, itching, burning, abnormal bleeding, pelvic pain, abdominal pain, n/v, and fever/chills  The following portions of the patient's history were reviewed and updated as appropriate: allergies, current medications, past family history, past medical history, past social history, past surgical history and problem list     Review of Systems   Constitutional: Negative for chills and fever  Gastrointestinal: Negative for abdominal distention, abdominal pain, nausea and vomiting  Genitourinary: Negative for difficulty urinating, dysuria, frequency, genital sores, hematuria, menstrual problem, pelvic pain, urgency, vaginal bleeding, vaginal discharge and vaginal pain  Objective:      /82   Ht 5' 4" (1 626 m)   Wt 54 5 kg (120 lb 3 2 oz)   BMI 20 63 kg/m²          Physical Exam   Constitutional: She is oriented to person, place, and time  Vital signs are normal  She appears well-developed and well-nourished  Genitourinary: Vagina normal and uterus normal  No labial fusion   There is no rash, tenderness, lesion or injury on the right labia  There is no rash, tenderness, lesion or injury on the left labia  Cervix exhibits no motion tenderness, no discharge and no friability  Right adnexum displays no mass, no tenderness and no fullness  Left adnexum displays no mass, no tenderness and no fullness  No erythema, tenderness or bleeding in the vagina  No vaginal discharge found  Lymphadenopathy: No inguinal adenopathy noted on the right or left side  Neurological: She is alert and oriented to person, place, and time  Skin: Skin is warm and dry  Psychiatric: She has a normal mood and affect  Her behavior is normal  Judgment and thought content normal    Vitals reviewed

## 2019-03-30 LAB
C TRACH DNA SPEC QL NAA+PROBE: NEGATIVE
N GONORRHOEA DNA SPEC QL NAA+PROBE: NEGATIVE

## 2019-04-03 ENCOUNTER — TELEPHONE (OUTPATIENT)
Dept: OBGYN CLINIC | Facility: CLINIC | Age: 44
End: 2019-04-03

## 2019-09-13 ENCOUNTER — APPOINTMENT (EMERGENCY)
Dept: RADIOLOGY | Facility: HOSPITAL | Age: 44
End: 2019-09-13

## 2019-09-13 ENCOUNTER — HOSPITAL ENCOUNTER (EMERGENCY)
Facility: HOSPITAL | Age: 44
Discharge: HOME | End: 2019-09-13
Attending: EMERGENCY MEDICINE | Admitting: EMERGENCY MEDICINE

## 2019-09-13 VITALS
HEART RATE: 80 BPM | DIASTOLIC BLOOD PRESSURE: 84 MMHG | SYSTOLIC BLOOD PRESSURE: 124 MMHG | WEIGHT: 121.25 LBS | HEIGHT: 64 IN | RESPIRATION RATE: 18 BRPM | BODY MASS INDEX: 20.7 KG/M2 | TEMPERATURE: 97.9 F | OXYGEN SATURATION: 100 %

## 2019-09-13 DIAGNOSIS — R10.9 ABDOMINAL PAIN: Primary | ICD-10-CM

## 2019-09-13 LAB
ALBUMIN SERPL BCP-MCNC: 4 G/DL (ref 3.5–5)
ALP SERPL-CCNC: 100 U/L (ref 46–116)
ALT SERPL W P-5'-P-CCNC: 33 U/L (ref 12–78)
ANION GAP SERPL CALCULATED.3IONS-SCNC: 3 MMOL/L (ref 4–13)
AST SERPL W P-5'-P-CCNC: 27 U/L (ref 5–45)
BACTERIA UR QL AUTO: ABNORMAL /HPF
BASOPHILS # BLD AUTO: 0.03 THOUSANDS/ΜL (ref 0–0.1)
BASOPHILS NFR BLD AUTO: 1 % (ref 0–1)
BILIRUB SERPL-MCNC: 0.38 MG/DL (ref 0.2–1)
BILIRUB UR QL STRIP: NEGATIVE
BUN SERPL-MCNC: 16 MG/DL (ref 5–25)
CALCIUM SERPL-MCNC: 8.9 MG/DL (ref 8.3–10.1)
CHLORIDE SERPL-SCNC: 105 MMOL/L (ref 100–108)
CLARITY UR: CLEAR
CO2 SERPL-SCNC: 29 MMOL/L (ref 21–32)
COLOR UR: YELLOW
COLOR, POC: NORMAL
CREAT SERPL-MCNC: 0.58 MG/DL (ref 0.6–1.3)
EOSINOPHIL # BLD AUTO: 0.08 THOUSAND/ΜL (ref 0–0.61)
EOSINOPHIL NFR BLD AUTO: 2 % (ref 0–6)
ERYTHROCYTE [DISTWIDTH] IN BLOOD BY AUTOMATED COUNT: 13.2 % (ref 11.6–15.1)
EXT PREG TEST URINE: NEGATIVE
EXT. CONTROL ED NAV: NORMAL
GFR SERPL CREATININE-BSD FRML MDRD: 113 ML/MIN/1.73SQ M
GLUCOSE SERPL-MCNC: 99 MG/DL (ref 65–140)
GLUCOSE UR STRIP-MCNC: NEGATIVE MG/DL
HCT VFR BLD AUTO: 41.8 % (ref 34.8–46.1)
HGB BLD-MCNC: 13.5 G/DL (ref 11.5–15.4)
HGB UR QL STRIP.AUTO: ABNORMAL
HYALINE CASTS #/AREA URNS LPF: ABNORMAL /LPF
IMM GRANULOCYTES # BLD AUTO: 0.01 THOUSAND/UL (ref 0–0.2)
IMM GRANULOCYTES NFR BLD AUTO: 0 % (ref 0–2)
KETONES UR STRIP-MCNC: NEGATIVE MG/DL
LEUKOCYTE ESTERASE UR QL STRIP: NEGATIVE
LIPASE SERPL-CCNC: 141 U/L (ref 73–393)
LYMPHOCYTES # BLD AUTO: 1.24 THOUSANDS/ΜL (ref 0.6–4.47)
LYMPHOCYTES NFR BLD AUTO: 23 % (ref 14–44)
MCH RBC QN AUTO: 29.3 PG (ref 26.8–34.3)
MCHC RBC AUTO-ENTMCNC: 32.3 G/DL (ref 31.4–37.4)
MCV RBC AUTO: 91 FL (ref 82–98)
MONOCYTES # BLD AUTO: 0.4 THOUSAND/ΜL (ref 0.17–1.22)
MONOCYTES NFR BLD AUTO: 8 % (ref 4–12)
NEUTROPHILS # BLD AUTO: 3.6 THOUSANDS/ΜL (ref 1.85–7.62)
NEUTS SEG NFR BLD AUTO: 66 % (ref 43–75)
NITRITE UR QL STRIP: NEGATIVE
NON-SQ EPI CELLS URNS QL MICRO: ABNORMAL /HPF
NRBC BLD AUTO-RTO: 0 /100 WBCS
PH UR STRIP.AUTO: 7 [PH] (ref 4.5–8)
PLATELET # BLD AUTO: 293 THOUSANDS/UL (ref 149–390)
PMV BLD AUTO: 11.1 FL (ref 8.9–12.7)
POTASSIUM SERPL-SCNC: 3.8 MMOL/L (ref 3.5–5.3)
PROT SERPL-MCNC: 8.1 G/DL (ref 6.4–8.2)
PROT UR STRIP-MCNC: NEGATIVE MG/DL
RBC # BLD AUTO: 4.61 MILLION/UL (ref 3.81–5.12)
RBC #/AREA URNS AUTO: ABNORMAL /HPF
SODIUM SERPL-SCNC: 137 MMOL/L (ref 136–145)
SP GR UR STRIP.AUTO: 1.02 (ref 1–1.03)
UROBILINOGEN UR QL STRIP.AUTO: 0.2 E.U./DL
WBC # BLD AUTO: 5.36 THOUSAND/UL (ref 4.31–10.16)
WBC #/AREA URNS AUTO: ABNORMAL /HPF

## 2019-09-13 PROCEDURE — 83690 ASSAY OF LIPASE: CPT | Performed by: EMERGENCY MEDICINE

## 2019-09-13 PROCEDURE — 36415 COLL VENOUS BLD VENIPUNCTURE: CPT | Performed by: EMERGENCY MEDICINE

## 2019-09-13 PROCEDURE — 99284 EMERGENCY DEPT VISIT MOD MDM: CPT | Performed by: EMERGENCY MEDICINE

## 2019-09-13 PROCEDURE — 85025 COMPLETE CBC W/AUTO DIFF WBC: CPT | Performed by: EMERGENCY MEDICINE

## 2019-09-13 PROCEDURE — 74177 CT ABD & PELVIS W/CONTRAST: CPT

## 2019-09-13 PROCEDURE — 80053 COMPREHEN METABOLIC PANEL: CPT | Performed by: EMERGENCY MEDICINE

## 2019-09-13 PROCEDURE — 81025 URINE PREGNANCY TEST: CPT | Performed by: EMERGENCY MEDICINE

## 2019-09-13 PROCEDURE — 99284 EMERGENCY DEPT VISIT MOD MDM: CPT

## 2019-09-13 PROCEDURE — 81001 URINALYSIS AUTO W/SCOPE: CPT

## 2019-09-13 RX ADMIN — IOHEXOL 100 ML: 350 INJECTION, SOLUTION INTRAVENOUS at 10:19

## 2019-09-13 NOTE — ED NOTES
Shaggy Paulson brought from [facility] to ED treatment room 19 via Ambulation  Patient instructed to change into gown  Call bell placed within reach  Primary RN Jeana Gauthier notified of patient       Orlene Graver  09/13/19 [id number]

## 2019-09-13 NOTE — ED PROVIDER NOTES
History  Chief Complaint   Patient presents with    Abdominal Pain     Pt c/o of 2 weeks abd pain/cramping, some headaches, some nasuea and some constipation  Has taken prg test at home which were negative     44F  presenting with abdominal cramping and nausea which has been going on for over 2 weeks and worsened over the last few days  Says the abdominal cramping is in the middle of her abdomen and nothing makes it better or worse  Also says she has some white discharge from her right nipple yesterday  Denies any fever, chills, vomiting, blood in stool, urinary symptoms, vaginal discharge or spotting  Prior to Admission Medications   Prescriptions Last Dose Informant Patient Reported? Taking? albuterol (5 mg/mL) 0 5 % nebulizer solution Past Month at Unknown time Self Yes Yes   Sig: Take 2 5 mg by nebulization every 6 (six) hours as needed for wheezing   albuterol (VENTOLIN HFA) 90 mcg/act inhaler Past Month at Unknown time  Yes Yes   Sig: Inhale 2 puffs   cyclobenzaprine (FLEXERIL) 5 mg tablet Past Month at Unknown time  Yes Yes   Sig: Take 1 tablet by mouth      Facility-Administered Medications: None       Past Medical History:   Diagnosis Date    Asthma     Back pain     History of thyroid disorder     History of tinea cruris     History of vaginal discharge     Microscopic hematuria     History    Papilloma of tongue     History    Vaginal candidiasis     History       History reviewed  No pertinent surgical history      Family History   Problem Relation Age of Onset    Hypertension Mother     Mental illness Mother         Disorder    Substance Abuse Mother     Breast cancer Mother 27    Thyroid cancer Mother         Metastasis from thyroid cancer    Ovarian cancer Mother 48    Diabetes type I Mother         mellitus with other kidney complication    Diabetes type II Mother         without complication, without long term current use of insulin     Cancer Father    [facility] Hypertension Maternal Grandmother     Cancer Paternal Grandfather     Ovarian cancer Maternal Aunt      I have reviewed and agree with the history as documented  Social History     Tobacco Use    Smoking status: Never Smoker    Smokeless tobacco: Never Used   Substance Use Topics    Alcohol use: No    Drug use: No        Review of Systems   Constitutional: Negative for activity change, appetite change, chills, fatigue and fever  HENT: Negative for ear pain, rhinorrhea and tinnitus  Eyes: Negative for pain and visual disturbance  Respiratory: Negative for apnea, cough, chest tightness, shortness of breath and wheezing  Cardiovascular: Negative for chest pain, palpitations and leg swelling  Gastrointestinal: Positive for abdominal pain, constipation and nausea  Negative for diarrhea and vomiting  Genitourinary: Negative for dysuria, flank pain and pelvic pain  Musculoskeletal: Negative for arthralgias and myalgias  Skin: Negative for rash and wound  Neurological: Negative for dizziness, syncope, weakness, numbness and headaches  All other systems reviewed and are negative  Physical Exam  ED Triage Vitals [09/13/19 0831]   Temperature Pulse Respirations Blood Pressure SpO2   97 9 °F (36 6 °C) 80 18 151/83 100 %      Temp Source Heart Rate Source Patient Position - Orthostatic VS BP Location FiO2 (%)   Oral Monitor Sitting Right arm --      Pain Score       2             Orthostatic Vital Signs  Vitals:    09/13/19 0831 09/13/19 1030   BP: 151/83 124/84   Pulse: 80 80   Patient Position - Orthostatic VS: Sitting        Physical Exam   Constitutional: She is oriented to person, place, and time  She appears well-developed and well-nourished  No distress  HENT:   Head: Normocephalic and atraumatic  Eyes: Conjunctivae and EOM are normal  Right eye exhibits no discharge  Left eye exhibits no discharge  No scleral icterus  Neck: Normal range of motion  Neck supple  No JVD present   No tracheal deviation present  Cardiovascular: Normal rate, regular rhythm, normal heart sounds and intact distal pulses  Exam reveals no gallop and no friction rub  No murmur heard  Pulmonary/Chest: Effort normal and breath sounds normal  No stridor  No respiratory distress  She has no wheezes  She has no rales  She exhibits no tenderness  Abdominal: Soft  There is tenderness in the epigastric area and suprapubic area  Musculoskeletal: Normal range of motion  She exhibits no edema, tenderness or deformity  Neurological: She is alert and oriented to person, place, and time  No sensory deficit  She exhibits normal muscle tone  Skin: Skin is warm  Capillary refill takes less than 2 seconds  No rash noted  She is not diaphoretic  No erythema  Psychiatric: She has a normal mood and affect  Her behavior is normal  Judgment and thought content normal    Nursing note and vitals reviewed        ED Medications  Medications   iohexol (OMNIPAQUE) 350 MG/ML injection (MULTI-DOSE) 100 mL (100 mL Intravenous Given 9/13/19 1019)       Diagnostic Studies  Results Reviewed     Procedure Component Value Units Date/Time    Urine Microscopic [[id number]]  (Abnormal) Collected:  09/13/19 0904    Lab Status:  Final result Specimen:  Urine, Other Updated:  09/13/19 0939     RBC, UA 2-4 /hpf      WBC, UA None Seen /hpf      Epithelial Cells Occasional /hpf      Bacteria, UA Occasional /hpf      Hyaline Casts, UA None Seen /lpf     CBC and differential [[id number]] Collected:  09/13/19 0912    Lab Status:  Final result Specimen:  Blood from Arm, Left Updated:  09/13/19 0939     WBC 5 36 Thousand/uL      RBC 4 61 Million/uL      Hemoglobin 13 5 g/dL      Hematocrit 41 8 %      MCV 91 fL      MCH 29 3 pg      MCHC 32 3 g/dL      RDW 13 2 %      MPV 11 1 fL      Platelets 468 Thousands/uL      nRBC 0 /100 WBCs      Neutrophils Relative 66 %      Immat GRANS % 0 %      Lymphocytes Relative 23 %      Monocytes Relative 8 % Eosinophils Relative 2 %      Basophils Relative 1 %      Neutrophils Absolute 3 60 Thousands/µL      Immature Grans Absolute 0 01 Thousand/uL      Lymphocytes Absolute 1 24 Thousands/µL      Monocytes Absolute 0 40 Thousand/µL      Eosinophils Absolute 0 08 Thousand/µL      Basophils Absolute 0 03 Thousands/µL     Comprehensive metabolic panel [[id number]]  (Abnormal) Collected:  09/13/19 0912    Lab Status:  Final result Specimen:  Blood from Arm, Left Updated:  09/13/19 0934     Sodium 137 mmol/L      Potassium 3 8 mmol/L      Chloride 105 mmol/L      CO2 29 mmol/L      ANION GAP 3 mmol/L      BUN 16 mg/dL      Creatinine 0 58 mg/dL      Glucose 99 mg/dL      Calcium 8 9 mg/dL      AST 27 U/L      ALT 33 U/L      Alkaline Phosphatase 100 U/L      Total Protein 8 1 g/dL      Albumin 4 0 g/dL      Total Bilirubin 0 38 mg/dL      eGFR 113 ml/min/1 73sq m     Narrative:       [place] guidelines for Chronic Kidney Disease (CKD):     Stage 1 with normal or high GFR (GFR > 90 mL/min/1 73 square meters)    Stage 2 Mild CKD (GFR = 60-89 mL/min/1 73 square meters)    Stage 3A Moderate CKD (GFR = 45-59 mL/min/1 73 square meters)    Stage 3B Moderate CKD (GFR = 30-44 mL/min/1 73 square meters)    Stage 4 Severe CKD (GFR = 15-29 mL/min/1 73 square meters)    Stage 5 End Stage CKD (GFR <15 mL/min/1 73 square meters)  Note: GFR calculation is accurate only with a steady state creatinine    Lipase [[id number]]  (Normal) Collected:  09/13/19 0912    Lab Status:  Final result Specimen:  Blood from Arm, Left Updated:  09/13/19 0934     Lipase 141 u/L     POCT pregnancy, urine [[id number]]  (Normal) Resulted:  09/13/19 0904    Lab Status:  Final result Updated:  09/13/19 0904     EXT PREG TEST UR (Ref: Negative) negative     Control valid    POCT urinalysis dipstick [[id number]]  (Normal) Resulted:  09/13/19 0904    Lab Status:  Final result Updated:  09/13/19 0904     Color, UA see results    ED Urine Macroscopic [[id number]]  (Abnormal) Collected:  09/13/19 0904    Lab Status:  Final result Specimen:  Urine Updated:  09/13/19 0903     Color, UA Yellow     Clarity, UA Clear     pH, UA 7 0     Leukocytes, UA Negative     Nitrite, UA Negative     Protein, UA Negative mg/dl      Glucose, UA Negative mg/dl      Ketones, UA Negative mg/dl      Urobilinogen, UA 0 2 E U /dl      Bilirubin, UA Negative     Blood, UA Trace     Specific [place], UA 1 020    Narrative:       [facility] RESULT                 CT abdomen pelvis with contrast   Final Result by Tegan Keenan DO (09/13 1034)      No acute intra-abdominal abnormality  Workstation performed: [id number]               Procedures  Procedures        ED Course                               MDM  Number of Diagnoses or Management Options  Abdominal pain:   Diagnosis management comments: Negative pregnancy test  Abdominal CT negative  Lab values normal  Patient will follow up with PCP  Return precautions advised      Disposition  Final diagnoses:   Abdominal pain     Time reflects when diagnosis was documented in both MDM as applicable and the Disposition within this note     Time User Action Codes Description Comment    9/13/2019 10:42 AM Manuel Sallie Add [R10 9] Abdominal pain       ED Disposition     ED Disposition Condition Date/Time Comment    Discharge Stable Fri Sep 13, 2019 10:38 AM Haresh Hall discharge to home/self care              Follow-up Information     Follow up With Specialties Details Why Contact Info Additional Information    Jose Odell MD Obstetrics and Gynecology, Obstetrics, Gynecology Schedule an appointment as soon as possible for a visit   [address]  [place] [address]       [facility] [address] Emergency Department Emergency Medicine Go to  If symptoms worsen [address]  [phone]  ED, [address] [place] [address], [id number]          Discharge Medication List as of 9/13/2019 10:42 AM      CONTINUE these medications which have NOT CHANGED    Details   albuterol (5 mg/mL) 0 5 % nebulizer solution Take 2 5 mg by nebulization every 6 (six) hours as needed for wheezing, Historical Med      albuterol (VENTOLIN HFA) 90 mcg/act inhaler Inhale 2 puffs, Starting Mon 12/19/2011, Historical Med      cyclobenzaprine (FLEXERIL) 5 mg tablet Take 1 tablet by mouth, Starting Mon 10/6/2014, Historical Med           No discharge procedures on file  ED Provider  Attending physically available and evaluated Daryle Silvers  JESUS managed the patient along with the ED Attending      Electronically Signed by         Nette Sharma DO  09/14/19 5623

## 2020-01-25 ENCOUNTER — HOSPITAL ENCOUNTER (EMERGENCY)
Facility: HOSPITAL | Age: 45
Discharge: OTHER INSTITUTION | End: 2020-01-25
Attending: EMERGENCY MEDICINE | Admitting: EMERGENCY MEDICINE

## 2020-01-25 ENCOUNTER — HOSPITAL ENCOUNTER (OUTPATIENT)
Facility: HOSPITAL | Age: 45
Setting detail: OBSERVATION
LOS: 1 days | Discharge: HOME | End: 2020-01-26
Attending: OBSTETRICS & GYNECOLOGY | Admitting: OBSTETRICS & GYNECOLOGY

## 2020-01-25 ENCOUNTER — APPOINTMENT (EMERGENCY)
Dept: RADIOLOGY | Facility: HOSPITAL | Age: 45
End: 2020-01-25

## 2020-01-25 VITALS
SYSTOLIC BLOOD PRESSURE: 117 MMHG | RESPIRATION RATE: 18 BRPM | DIASTOLIC BLOOD PRESSURE: 73 MMHG | TEMPERATURE: 97.4 F | HEIGHT: 64 IN | WEIGHT: 115 LBS | BODY MASS INDEX: 19.63 KG/M2 | OXYGEN SATURATION: 98 % | HEART RATE: 76 BPM

## 2020-01-25 DIAGNOSIS — R11.0 NAUSEA: ICD-10-CM

## 2020-01-25 DIAGNOSIS — R10.32 LEFT LOWER QUADRANT ABDOMINAL PAIN: ICD-10-CM

## 2020-01-25 DIAGNOSIS — N83.299 COMPLEX OVARIAN CYST: Primary | ICD-10-CM

## 2020-01-25 LAB
ALBUMIN SERPL BCP-MCNC: 3.8 G/DL (ref 3.5–5)
ALP SERPL-CCNC: 105 U/L (ref 46–116)
ALT SERPL W P-5'-P-CCNC: 29 U/L (ref 12–78)
ANION GAP SERPL CALCULATED.3IONS-SCNC: 6 MMOL/L (ref 4–13)
AST SERPL W P-5'-P-CCNC: 15 U/L (ref 5–45)
ATRIAL RATE: 86 BPM
BASOPHILS # BLD AUTO: 0.03 THOUSANDS/ΜL (ref 0–0.1)
BASOPHILS NFR BLD AUTO: 1 % (ref 0–1)
BILIRUB SERPL-MCNC: 0.27 MG/DL (ref 0.2–1)
BUN SERPL-MCNC: 12 MG/DL (ref 5–25)
CALCIUM SERPL-MCNC: 8.5 MG/DL (ref 8.3–10.1)
CHLORIDE SERPL-SCNC: 107 MMOL/L (ref 100–108)
CO2 SERPL-SCNC: 27 MMOL/L (ref 21–32)
CREAT SERPL-MCNC: 0.72 MG/DL (ref 0.6–1.3)
EOSINOPHIL # BLD AUTO: 0.1 THOUSAND/ΜL (ref 0–0.61)
EOSINOPHIL NFR BLD AUTO: 2 % (ref 0–6)
ERYTHROCYTE [DISTWIDTH] IN BLOOD BY AUTOMATED COUNT: 13.4 % (ref 11.6–15.1)
EXT PREG TEST URINE: NEGATIVE
EXT. CONTROL ED NAV: NORMAL
GFR SERPL CREATININE-BSD FRML MDRD: 103 ML/MIN/1.73SQ M
GLUCOSE SERPL-MCNC: 105 MG/DL (ref 65–140)
HCT VFR BLD AUTO: 39.8 % (ref 34.8–46.1)
HGB BLD-MCNC: 13.1 G/DL (ref 11.5–15.4)
IMM GRANULOCYTES # BLD AUTO: 0.02 THOUSAND/UL (ref 0–0.2)
IMM GRANULOCYTES NFR BLD AUTO: 0 % (ref 0–2)
LIPASE SERPL-CCNC: 124 U/L (ref 73–393)
LYMPHOCYTES # BLD AUTO: 2.02 THOUSANDS/ΜL (ref 0.6–4.47)
LYMPHOCYTES NFR BLD AUTO: 33 % (ref 14–44)
MCH RBC QN AUTO: 29.6 PG (ref 26.8–34.3)
MCHC RBC AUTO-ENTMCNC: 32.9 G/DL (ref 31.4–37.4)
MCV RBC AUTO: 90 FL (ref 82–98)
MONOCYTES # BLD AUTO: 0.57 THOUSAND/ΜL (ref 0.17–1.22)
MONOCYTES NFR BLD AUTO: 9 % (ref 4–12)
NEUTROPHILS # BLD AUTO: 3.46 THOUSANDS/ΜL (ref 1.85–7.62)
NEUTS SEG NFR BLD AUTO: 55 % (ref 43–75)
NRBC BLD AUTO-RTO: 0 /100 WBCS
P AXIS: 63 DEGREES
PLATELET # BLD AUTO: 353 THOUSANDS/UL (ref 149–390)
PMV BLD AUTO: 10.8 FL (ref 8.9–12.7)
POTASSIUM SERPL-SCNC: 3.5 MMOL/L (ref 3.5–5.3)
PR INTERVAL: 168 MS
PROT SERPL-MCNC: 7.8 G/DL (ref 6.4–8.2)
QRS AXIS: 31 DEGREES
QRSD INTERVAL: 98 MS
QT INTERVAL: 358 MS
QTC INTERVAL: 428 MS
RBC # BLD AUTO: 4.42 MILLION/UL (ref 3.81–5.12)
SODIUM SERPL-SCNC: 140 MMOL/L (ref 136–145)
T WAVE AXIS: 59 DEGREES
TROPONIN I SERPL-MCNC: 0.02 NG/ML
VENTRICULAR RATE: 86 BPM
WBC # BLD AUTO: 6.2 THOUSAND/UL (ref 4.31–10.16)

## 2020-01-25 PROCEDURE — 96374 THER/PROPH/DIAG INJ IV PUSH: CPT

## 2020-01-25 PROCEDURE — 76830 TRANSVAGINAL US NON-OB: CPT

## 2020-01-25 PROCEDURE — 99285 EMERGENCY DEPT VISIT HI MDM: CPT | Performed by: EMERGENCY MEDICINE

## 2020-01-25 PROCEDURE — 76856 US EXAM PELVIC COMPLETE: CPT

## 2020-01-25 PROCEDURE — 93010 ELECTROCARDIOGRAM REPORT: CPT | Performed by: INTERNAL MEDICINE

## 2020-01-25 PROCEDURE — 93005 ELECTROCARDIOGRAM TRACING: CPT

## 2020-01-25 PROCEDURE — 74177 CT ABD & PELVIS W/CONTRAST: CPT

## 2020-01-25 PROCEDURE — 85025 COMPLETE CBC W/AUTO DIFF WBC: CPT | Performed by: EMERGENCY MEDICINE

## 2020-01-25 PROCEDURE — 84484 ASSAY OF TROPONIN QUANT: CPT | Performed by: EMERGENCY MEDICINE

## 2020-01-25 PROCEDURE — 71046 X-RAY EXAM CHEST 2 VIEWS: CPT

## 2020-01-25 PROCEDURE — 96375 TX/PRO/DX INJ NEW DRUG ADDON: CPT

## 2020-01-25 PROCEDURE — G0379 DIRECT REFER HOSPITAL OBSERV: HCPCS

## 2020-01-25 PROCEDURE — 80053 COMPREHEN METABOLIC PANEL: CPT | Performed by: EMERGENCY MEDICINE

## 2020-01-25 PROCEDURE — 83690 ASSAY OF LIPASE: CPT | Performed by: EMERGENCY MEDICINE

## 2020-01-25 PROCEDURE — 81025 URINE PREGNANCY TEST: CPT | Performed by: EMERGENCY MEDICINE

## 2020-01-25 PROCEDURE — NC001 PR NO CHARGE: Performed by: OBSTETRICS & GYNECOLOGY

## 2020-01-25 PROCEDURE — 36415 COLL VENOUS BLD VENIPUNCTURE: CPT | Performed by: EMERGENCY MEDICINE

## 2020-01-25 PROCEDURE — 99285 EMERGENCY DEPT VISIT HI MDM: CPT

## 2020-01-25 RX ORDER — KETOROLAC TROMETHAMINE 30 MG/ML
15 INJECTION, SOLUTION INTRAMUSCULAR; INTRAVENOUS ONCE
Status: COMPLETED | OUTPATIENT
Start: 2020-01-25 | End: 2020-01-25

## 2020-01-25 RX ORDER — ONDANSETRON 2 MG/ML
4 INJECTION INTRAMUSCULAR; INTRAVENOUS ONCE
Status: COMPLETED | OUTPATIENT
Start: 2020-01-25 | End: 2020-01-25

## 2020-01-25 RX ADMIN — IOHEXOL 100 ML: 350 INJECTION, SOLUTION INTRAVENOUS at 03:09

## 2020-01-25 RX ADMIN — ONDANSETRON 4 MG: 2 INJECTION INTRAMUSCULAR; INTRAVENOUS at 02:27

## 2020-01-25 RX ADMIN — KETOROLAC TROMETHAMINE 15 MG: 30 INJECTION, SOLUTION INTRAMUSCULAR at 02:27

## 2020-01-25 NOTE — ED CARE HANDOFF
Emergency Department Sign Out Note        Sign out and transfer of care from Dr Jennifer Villatoro  See Separate Emergency Department note  The patient, Cindi Whitfield, was evaluated by the previous provider for left lower quadrant abdominal pain  Workup Completed:  CT scan concerning for possible left hydrosalpinx, pelvic ultrasound performed which demonstrated a complex left-sided ovarian cyst with area of mural nodularity in flow  ED Course / Workup Pending (followup): Following results of CT scan and pelvic ultrasound, case was discussed with Dr Danitza Casey from OBGYN who advised the patient be transferred to [facility] for possible surgical intervention  Patient remained relatively asymptomatic other than some mild abdominal pain  She remained hemodynamically stable  She will be transferred to [facility] for [facility] care  Medical necessity and EMTALA documents were placed in the patient's chart              PERC Rule for PE      Most Recent Value   PERC Rule for PE   Age >=50  0 Filed at: 01/25/2020 0221   HR >=100  0 Filed at: 01/25/2020 0221   O2 Sat on room air < 95%  0 Filed at: 01/25/2020 0221   History of PE or DVT  0 Filed at: 01/25/2020 0221   Recent trauma or surgery  0 Filed at: 01/25/2020 0221   Hemoptysis  0 Filed at: 01/25/2020 0221   Exogenous estrogen  0 Filed at: 01/25/2020 0221   Unilateral leg swelling  0 Filed at: 01/25/2020 0221   PERC Rule for PE Results  0 Filed at: 01/25/2020 0221                    ED Course as of Jan 25 1550   Sat Jan 25, 2020   0719 39 yo abdominal pain and chest pain pending pelvic ultrasound read and GYN consult      [address] Danitza Casye awaiting call back      1101 Per Doctor andrea patient will be transferred to [facility]      [phone] PACS working on transfer        Procedures  MDM  Number of Diagnoses or Management Options  Complex ovarian cyst:   Left lower quadrant abdominal pain:   Nausea:   Diagnosis management comments: Patient transfered to [facility] Manuel Hurst to the service of Dr Jose Lozano for definitive management  Disposition  Final diagnoses:   Left lower quadrant abdominal pain   Complex ovarian cyst   Nausea     Time reflects when diagnosis was documented in both MDM as applicable and the Disposition within this note     Time User Action Codes Description Comment    1/25/2020 11:15 AM Check, Radha Gutter Add [R10 32] Left lower quadrant abdominal pain     1/25/2020 11:15 AM Check, Adine Dense [F02 108] Complex ovarian cyst     1/25/2020 11:15 AM Check, Radha Gutter Modify [R10 32] Left lower quadrant abdominal pain     1/25/2020 11:15 AM Check, Radha Gutter Modify [E04 175] Complex ovarian cyst     1/25/2020 11:15 AM Check, Radha Gutter Add [R11 0] Nausea       ED Disposition     ED Disposition Condition Date/Time Comment    Transfer to Another Facility  Sat Jan 25, 2020 12:20 PM Pablito Powers should be transferred out to [facility]  Follow-up Information    None       Discharge Medication List as of 1/25/2020 11:18 AM      CONTINUE these medications which have NOT CHANGED    Details   albuterol (5 mg/mL) 0 5 % nebulizer solution Take 2 5 mg by nebulization every 6 (six) hours as needed for wheezing, Historical Med      albuterol (VENTOLIN HFA) 90 mcg/act inhaler Inhale 2 puffs, Starting Mon 12/19/2011, Historical Med      cyclobenzaprine (FLEXERIL) 5 mg tablet Take 1 tablet by mouth, Starting Mon 10/6/2014, Historical Med           No discharge procedures on file         ED Provider  Electronically Signed by     Chrystine Cranker, MD  01/25/20 2005

## 2020-01-25 NOTE — ED PROVIDER NOTES
History  Chief Complaint   Patient presents with    Abdominal Pain     Pt present to ed with c/o worsening abdominal pain that started "a couple days ago"  Pt also report mild nausea  81-year-old female presenting for chest pain as well as abdominal pain  States that this is been going on for approximately 1 week and has been getting worse  Initially reports the pain started at her epigastrum/periumblical area then radiated throughout her entire abdomen, especially her lower abdomen  Pain is crampy with intermittent sharper pains  Some mild nausea today which is new, no vomiting  Denies diarrhea  In fact, has been constipated which is usual for her  Denies any vaginal bleeding or discharge, no urinary symptoms  Chest pain seems "connected" to her abdominal pain at the left side at times, non-pleuritic, not associated with diaphoresis, lightheadedness, dizziness, syncope  FH only notable for multiple cancers in her mother  Denies family history of clotting disorders or ACS at a young age  ROS otherwise neg as below  History provided by:  Patient   used: No    Abdominal Pain   Associated symptoms: chest pain and nausea    Associated symptoms: no chills, no cough, no diarrhea, no dysuria, no fatigue, no fever, no hematuria, no shortness of breath, no sore throat, no vaginal bleeding, no vaginal discharge and no vomiting        Prior to Admission Medications   Prescriptions Last Dose Informant Patient Reported? Taking?    albuterol (5 mg/mL) 0 5 % nebulizer solution  Self Yes No   Sig: Take 2 5 mg by nebulization every 6 (six) hours as needed for wheezing   albuterol (VENTOLIN HFA) 90 mcg/act inhaler   Yes No   Sig: Inhale 2 puffs   cyclobenzaprine (FLEXERIL) 5 mg tablet   Yes No   Sig: Take 1 tablet by mouth      Facility-Administered Medications: None       Past Medical History:   Diagnosis Date    Asthma     Back pain     History of thyroid disorder     History of tinea cruris     History of vaginal discharge     Microscopic hematuria     History    Papilloma of tongue     History    Vaginal candidiasis     History       History reviewed  No pertinent surgical history  Family History   Problem Relation Age of Onset    Hypertension Mother     Mental illness Mother         Disorder    Substance Abuse Mother     Breast cancer Mother 27    Thyroid cancer Mother         Metastasis from thyroid cancer    Ovarian cancer Mother 48    Diabetes type I Mother         mellitus with other kidney complication    Diabetes type II Mother         without complication, without long term current use of insulin     Cancer Father     Hypertension Maternal Grandmother     Cancer Paternal Grandfather     Ovarian cancer Maternal Aunt      I have reviewed and agree with the history as documented  Social History     Tobacco Use    Smoking status: Never Smoker    Smokeless tobacco: Never Used   Substance Use Topics    Alcohol use: No    Drug use: No        Review of Systems   Constitutional: Negative for chills, fatigue and fever  HENT: Negative for congestion, rhinorrhea and sore throat  Eyes: Negative for redness and visual disturbance  Respiratory: Negative for cough, shortness of breath and wheezing  Cardiovascular: Positive for chest pain  Negative for palpitations and leg swelling  Gastrointestinal: Positive for abdominal pain and nausea  Negative for diarrhea and vomiting  Genitourinary: Negative for difficulty urinating, dysuria, flank pain, frequency, hematuria, pelvic pain, urgency, vaginal bleeding, vaginal discharge and vaginal pain  Musculoskeletal: Negative for back pain and myalgias  Skin: Negative for pallor and rash  Neurological: Negative  Negative for syncope, weakness, light-headedness and headaches  Psychiatric/Behavioral: Negative for confusion  The patient is not nervous/anxious          Physical Exam  ED Triage Vitals   Temperature Pulse Respirations Blood Pressure SpO2   01/25/20 0145 01/25/20 0145 01/25/20 0145 01/25/20 0145 01/25/20 0145   (!) 97 4 °F (36 3 °C) 81 16 152/100 100 %      Temp Source Heart Rate Source Patient Position - Orthostatic VS BP Location FiO2 (%)   01/25/20 0145 01/25/20 0245 01/25/20 0145 01/25/20 0145 --   Oral Monitor Sitting Left arm       Pain Score       01/25/20 0145       6             Orthostatic Vital Signs  Vitals:    01/25/20 1100 01/25/20 1200 01/25/20 1300 01/25/20 1400   BP: 160/82 154/85 111/61 117/73   Pulse: 92 92 86 76   Patient Position - Orthostatic VS: Sitting Sitting Sitting Sitting       Physical Exam   Constitutional: She is oriented to person, place, and time  She appears well-developed and well-nourished  No distress  HENT:   Head: Normocephalic and atraumatic  Eyes: Pupils are equal, round, and reactive to light  Conjunctivae are normal    Neck: Normal range of motion  Cardiovascular: Normal rate, regular rhythm and normal heart sounds  No murmur heard  Pulmonary/Chest: Effort normal and breath sounds normal  No respiratory distress  She has no wheezes  Abdominal: Soft  Bowel sounds are normal  There is tenderness in the left lower quadrant  There is no rebound, no guarding and no CVA tenderness  Musculoskeletal: Normal range of motion  Neurological: She is alert and oriented to person, place, and time  No cranial nerve deficit or sensory deficit  She exhibits normal muscle tone  Coordination normal    Skin: Skin is warm and dry  No rash noted  Psychiatric: She has a normal mood and affect  Nursing note and vitals reviewed        ED Medications  Medications   ketorolac (TORADOL) injection 15 mg (15 mg Intravenous Given 1/25/20 0227)   ondansetron (ZOFRAN) injection 4 mg (4 mg Intravenous Given 1/25/20 0227)   iohexol (OMNIPAQUE) 350 MG/ML injection (MULTI-DOSE) 100 mL (100 mL Intravenous Given 1/25/20 0309)       Diagnostic Studies  Results Reviewed     Procedure Component Value Units Date/Time    Lipase [[id number]]  (Normal) Collected:  01/25/20 0224    Lab Status:  Final result Specimen:  Blood from Arm, Right Updated:  01/25/20 0248     Lipase 124 u/L     Troponin I [[id number]]  (Normal) Collected:  01/25/20 0224    Lab Status:  Final result Specimen:  Blood from Arm, Right Updated:  01/25/20 0248     Troponin I 0 02 ng/mL     Comprehensive metabolic panel [[id number]] Collected:  01/25/20 0224    Lab Status:  Final result Specimen:  Blood from Arm, Right Updated:  01/25/20 0248     Sodium 140 mmol/L      Potassium 3 5 mmol/L      Chloride 107 mmol/L      CO2 27 mmol/L      ANION GAP 6 mmol/L      BUN 12 mg/dL      Creatinine 0 72 mg/dL      Glucose 105 mg/dL      Calcium 8 5 mg/dL      AST 15 U/L      ALT 29 U/L      Alkaline Phosphatase 105 U/L      Total Protein 7 8 g/dL      Albumin 3 8 g/dL      Total Bilirubin 0 27 mg/dL      eGFR 103 ml/min/1 73sq m     Narrative:       [place] guidelines for Chronic Kidney Disease (CKD):     Stage 1 with normal or high GFR (GFR > 90 mL/min/1 73 square meters)    Stage 2 Mild CKD (GFR = 60-89 mL/min/1 73 square meters)    Stage 3A Moderate CKD (GFR = 45-59 mL/min/1 73 square meters)    Stage 3B Moderate CKD (GFR = 30-44 mL/min/1 73 square meters)    Stage 4 Severe CKD (GFR = 15-29 mL/min/1 73 square meters)    Stage 5 End Stage CKD (GFR <15 mL/min/1 73 square meters)  Note: GFR calculation is accurate only with a steady state creatinine    POCT pregnancy, urine [[id number]]  (Normal) Resulted:  01/25/20 0245    Lab Status:  Final result Updated:  01/25/20 0245     EXT PREG TEST UR (Ref: Negative) negative     Control valid    CBC and differential [[id number]] Collected:  01/25/20 0224    Lab Status:  Final result Specimen:  Blood from Arm, Right Updated:  01/25/20 0238     WBC 6 20 Thousand/uL      RBC 4 42 Million/uL      Hemoglobin 13 1 g/dL      Hematocrit 39 8 %      MCV 90 fL      MCH 29 6 pg      MCHC 32 9 g/dL RDW 13 4 %      MPV 10 8 fL      Platelets 307 Thousands/uL      nRBC 0 /100 WBCs      Neutrophils Relative 55 %      Immat GRANS % 0 %      Lymphocytes Relative 33 %      Monocytes Relative 9 %      Eosinophils Relative 2 %      Basophils Relative 1 %      Neutrophils Absolute 3 46 Thousands/µL      Immature Grans Absolute 0 02 Thousand/uL      Lymphocytes Absolute 2 02 Thousands/µL      Monocytes Absolute 0 57 Thousand/µL      Eosinophils Absolute 0 10 Thousand/µL      Basophils Absolute 0 03 Thousands/µL                  US pelvis complete w transvaginal   Final Result by Julieta Stephens DO (01/25 7654)   1  Abnormal appearance of the uterus, suggesting diffuse adenomyosis  2   Complex left ovarian cyst with area of mural nodularity and flow  According to the consensus conference statement from the Society of Radiologists in Ultrasound (Radiology:Volume 256: Number 3-September 2010  pp [phone] )  in this premenopausal    woman, this should be further evaluated by MR of the pelvis and/or surgical consultation  3   Mild abnormal appearance of the endometrium  This can be further evaluated on follow-up MRI of the pelvis is obtained  The study was marked in [facility] for immediate notification  The study was marked in [facility] for immediate notification  Workstation performed: [id number]         XR chest 2 views   Final Result by Jorge Luis Velásquez MD (01/25 2048)      No acute cardiopulmonary disease  Workstation performed: [id number]         CT abdomen pelvis with contrast   Final Result by Bobbi Calero MD (01/25 0345)      Findings suspicious for left hydrosalpinx  Please see discussion  Pelvic ultrasound is recommended for further evaluation              Workstation performed: [id number]               Procedures  ECG 12 Lead Documentation Only  Date/Time: 1/25/2020 2:35 AM  Performed by: Allen Rivera MD  Authorized by: Allen Rivera MD     Indications / Diagnosis: Cp  ECG reviewed by me, the ED Provider: yes    Previous ECG:     Previous ECG:  Unavailable  Interpretation:     Interpretation: normal    Comments:      Normal sinus, normal axis, no abnormalities  ED Course               PERC Rule for PE      Most Recent Value   PERC Rule for PE   Age >=50  0 Filed at: 01/25/2020 0221   HR >=100  0 Filed at: 01/25/2020 0221   O2 Sat on room air < 95%  0 Filed at: 01/25/2020 0221   History of PE or DVT  0 Filed at: 01/25/2020 0221   Recent trauma or surgery  0 Filed at: 01/25/2020 0221   Hemoptysis  0 Filed at: 01/25/2020 0221   Exogenous estrogen  0 Filed at: 01/25/2020 0221   Unilateral leg swelling  0 Filed at: 01/25/2020 0221   PERC Rule for PE Results  0 Filed at: 01/25/2020 0221                      [facility]  Number of Diagnoses or Management Options  Complex ovarian cyst:   Left lower quadrant abdominal pain:   Nausea:   Diagnosis management comments: 39 yo F presenting for nonspecific worsening symptoms of generalized abdominal pain and left sided CP for the past week, with exam notable for mild tenderness at left lower quadrant on exam  Patient appears well overall  Given chest pain, cardiac workup was obtained, unremarkable  CP is non-pleuritic, PE excluded using PERC rule  Given tenderness on exam, abdominal labs sent which were unremarkable, urine pregnancy negative  CT abd/pelvis significant for findings suspicious of left hydrosalpinx  Recommended pelvic US for further characterization  At time of signout, awaiting results of pelvis US results  Will plan to consult gyn after results available  Dispo per gyn           Disposition  Final diagnoses:   Left lower quadrant abdominal pain   Complex ovarian cyst   Nausea     Time reflects when diagnosis was documented in both MDM as applicable and the Disposition within this note     Time User Action Codes Description Comment    1/25/2020 11:15 AM Oracio Britt Add [R10 32] Left lower quadrant abdominal pain 1/25/2020 11:15 AM Check, Garry Matias [P00 314] Complex ovarian cyst     1/25/2020 11:15 AM Check, Gm [employer] Modify [R10 32] Left lower quadrant abdominal pain     1/25/2020 11:15 AM Check, G[employer] Modify [X82 228] Complex ovarian cyst     1/25/2020 11:15 AM Check, [employer] Add [R11 0] Nausea       ED Disposition     ED Disposition Condition Date/Time Comment    Transfer to Another Facility  Sat Jan 25, 2020 12:20 PM Champlain Ratel should be transferred out to [facility]  Follow-up Information    None         Discharge Medication List as of 1/25/2020 11:18 AM      CONTINUE these medications which have NOT CHANGED    Details   albuterol (5 mg/mL) 0 5 % nebulizer solution Take 2 5 mg by nebulization every 6 (six) hours as needed for wheezing, Historical Med      albuterol (VENTOLIN HFA) 90 mcg/act inhaler Inhale 2 puffs, Starting Mon 12/19/2011, Historical Med      cyclobenzaprine (FLEXERIL) 5 mg tablet Take 1 tablet by mouth, Starting Mon 10/6/2014, Historical Med           No discharge procedures on file  ED Provider  Attending physically available and evaluated Champlain Ratel  I managed the patient along with the ED Attending      Electronically Signed by         Carlyn Le MD  01/27/20 2038

## 2020-01-25 NOTE — EMTALA/ACUTE CARE TRANSFER
[facility] EMERGENCY DEPARTMENT  [address] [place] [address]  Dept: [phone]      SDVRJZ TRANSFER CONSENT    NAME Elza Luna                                         1975                              MRN [id number]    I have been informed of my rights regarding examination, treatment, and transfer   by Dr Arcelia Tobin MD    Benefits:   Definitive treatment, possible surgical intervention    Risks:   worsening pain, worsening clinical condition      Consent for Transfer:  I acknowledge that my medical condition has been evaluated and explained to me by the emergency department physician or other qualified medical person and/or my attending physician, who has recommended that I be transferred to the service of    at    The above potential benefits of such transfer, the potential risks associated with such transfer, and the probable risks of not being transferred have been explained to me, and I fully understand them  The doctor has explained that, in my case, the benefits of transfer outweigh the risks  I agree to be transferred  I authorize the performance of emergency medical procedures and treatments upon me in both transit and upon arrival at the receiving facility  Additionally, I authorize the release of any and all medical records to the receiving facility and request they be transported with me, if possible  I understand that the safest mode of transportation during a medical emergency is an ambulance and that the Hospital advocates the use of this mode of transport  Risks of traveling to the receiving facility by car, including absence of medical control, life sustaining equipment, such as oxygen, and medical personnel has been explained to me and I fully understand them  (GREG CORRECT BOX BELOW)  [  ]  I consent to the stated transfer and to be transported by ambulance/helicopter    [  ]  I consent to the stated transfer, but refuse transportation by ambulance and accept full responsibility for my transportation by car  I understand the risks of non-ambulance transfers and I exonerate the Hospital and its staff from any deterioration in my condition that results from this refusal     X___________________________________________    DATE  20  TIME________  Signature of patient or legally responsible individual signing on patient behalf           RELATIONSHIP TO PATIENT_________________________          Provider Certification    NAME Evan Ayala                                         1975                              MRN [id number]    A medical screening exam was performed on the above named patient  Based on the examination:    Condition Necessitating Transfer The primary encounter diagnosis was Complex ovarian cyst  Diagnoses of Left lower quadrant abdominal pain and Nausea were also pertinent to this visit  Patient Condition:      Reason for Transfer:      Transfer Requirements: Facility     · Space available and qualified personnel available for treatment as acknowledged by    · Agreed to accept transfer and to provide appropriate medical treatment as acknowledged by          · Appropriate medical records of the examination and treatment of the patient are provided at the time of transfer   [address] _______  · Transfer will be performed by qualified personnel from    and appropriate transfer equipment as required, including the use of necessary and appropriate life support measures      Provider Certification: I have examined the patient and explained the following risks and benefits of being transferred/refusing transfer to the patient/family:         Based on these reasonable risks and benefits to the patient and/or the unborn child(gene), and based upon the information available at the time of the patients examination, I certify that the medical benefits reasonably to be expected from the provision of appropriate medical treatments at another medical facility outweigh the increasing risks, if any, to the individuals medical condition, and in the case of labor to the unborn child, from effecting the transfer      X____________________________________________ DATE 01/25/20        TIME_______      ORIGINAL - SEND TO MEDICAL RECORDS   COPY - SEND WITH PATIENT DURING TRANSFER

## 2020-01-25 NOTE — PLAN OF CARE
Problem: PAIN - ADULT  Goal: Verbalizes/displays adequate comfort level or baseline comfort level  Description  Interventions:  - Encourage patient to monitor pain and request assistance  - Assess pain using appropriate pain scale  - Administer analgesics based on type and severity of pain and evaluate response  - Implement non-pharmacological measures as appropriate and evaluate response  - Consider cultural and social influences on pain and pain management  - Notify physician/advanced practitioner if interventions unsuccessful or patient reports new pain  Outcome: Progressing     Problem: INFECTION - ADULT  Goal: Absence or prevention of progression during hospitalization  Description  INTERVENTIONS:  - Assess and monitor for signs and symptoms of infection  - Monitor lab/diagnostic results  - Monitor all insertion sites, i e  indwelling lines, tubes, and drains  - Monitor endotracheal if appropriate and nasal secretions for changes in amount and color  - [place] appropriate cooling/warming therapies per order  - Administer medications as ordered  - Instruct and encourage patient and family to use good hand hygiene technique  - Identify and instruct in appropriate isolation precautions for identified infection/condition  Outcome: Progressing  Goal: Absence of fever/infection during neutropenic period  Description  INTERVENTIONS:  - Monitor WBC    Outcome: Progressing     Problem: SAFETY ADULT  Goal: Patient will remain free of falls  Description  INTERVENTIONS:  - Assess patient frequently for physical needs  -  Identify cognitive and physical deficits and behaviors that affect risk of falls    -  [place] fall precautions as indicated by assessment   - Educate patient/family on patient safety including physical limitations  - Instruct patient to call for assistance with activity based on assessment  - Modify environment to reduce risk of injury  - Consider OT/PT consult to assist with strengthening/mobility  Outcome: Progressing  Goal: Maintain or return to baseline ADL function  Description  INTERVENTIONS:  -  Assess patient's ability to carry out ADLs; assess patient's baseline for ADL function and identify physical deficits which impact ability to perform ADLs (bathing, care of mouth/teeth, toileting, grooming, dressing, etc )  - Assess/evaluate cause of self-care deficits   - Assess range of motion  - Assess patient's mobility; develop plan if impaired  - Assess patient's need for assistive devices and provide as appropriate  - Encourage maximum independence but intervene and supervise when necessary  - Involve family in performance of ADLs  - Assess for home care needs following discharge   - Consider OT consult to assist with ADL evaluation and planning for discharge  - Provide patient education as appropriate  Outcome: Progressing  Goal: Maintain or return mobility status to optimal level  Description  INTERVENTIONS:  - Assess patient's baseline mobility status (ambulation, transfers, stairs, etc )    - Identify cognitive and physical deficits and behaviors that affect mobility  - Identify mobility aids required to assist with transfers and/or ambulation (gait belt, sit-to-stand, lift, walker, cane, etc )  - [place] fall precautions as indicated by assessment  - Record patient progress and toleration of activity level on Mobility SBAR; progress patient to next Phase/Stage  - Instruct patient to call for assistance with activity based on assessment  - Consider rehabilitation consult to assist with strengthening/weightbearing, etc   Outcome: Progressing     Problem: Knowledge Deficit  Goal: Patient/family/caregiver demonstrates understanding of disease process, treatment plan, medications, and discharge instructions  Description  Complete learning assessment and assess knowledge base    Interventions:  - Provide teaching at level of understanding  - Provide teaching via preferred learning methods  Outcome: Progressing

## 2020-01-25 NOTE — ED ATTENDING ATTESTATION
1/25/2020  IAndrea MD, saw and evaluated the patient  I have discussed the patient with the resident/non-physician practitioner and agree with the resident's/non-physician practitioner's findings, Plan of Care, and MDM as documented in the resident's/non-physician practitioner's note, except where noted  All available labs and Radiology studies were reviewed  I was present for key portions of any procedure(s) performed by the resident/non-physician practitioner and I was immediately available to provide assistance  At this point I agree with the current assessment done in the Emergency Department  I have conducted an independent evaluation of this patient a history and physical is as follows:  Patient here with abdominal pain  Patient states that she has been having abdominal pain for about 1 week  She states that it started slowly in her mid abdomen, gotten steadily worse  She describes it as  She states that it is now worse in her lower abdomen  Patient states that she does not have urinary symptoms  She has been having constipation for the last 1-2 weeks  She has not taken anything for the constipation  She is sexually active, and denies vaginal discharge  She is  in a monogamous relationship  The patient states that she had some vaginal spotting this morning, but is not having her menses  She has not had fevers or chills  She has nausea but no vomiting  Her review of systems otherwise negative in 12 systems reviewed  She has no prior surgical history  On exam Vital signs were reviewed  Patient is awake, alert, interactive  The patient's pupils are equally round reactive to light  Oropharynx is clear with moist mucous membranes  Neck is supple and nontender with no adenopathy or JVD  Heart is regular with no murmurs, rubs, or gallops  Lungs are clear and equal with no wheezes, rales, or rhonchi    Abdomen is soft with mild suprapubic and left lower quadrant tenderness with no masses, rebound, or guarding  There is no CVA tenderness  The patient was completely exposed  There is no skin breakdown  There are no rashes or skin changes  Extremities are warm and well perfused with good pulses  The patient has normal strength, sensation, and cranial nerves    Impression:  Abdominal pain, likely constipation, will rule out pregnancy, will ct for diverticulitis  ED Course         Critical Care Time  Procedures

## 2020-01-25 NOTE — ED NOTES
[place] states that they should hopefully be here with in the next 1/2 hour     Porter Haley, [address] [facility]  01/25/20 4771

## 2020-01-25 NOTE — ED NOTES
Attempted to call report at this time   Spoke with Pablito Tang and he will call back     Seble Marshall, [facility]  01/25/20 1977

## 2020-01-25 NOTE — ED NOTES
Pt to be transported to [place] [address] via [facility] at [phone]  Accepting physician is Dr Ainsley Friedman   Call [phone] with report     Lyndsay Lemons RN  01/25/20 6308

## 2020-01-26 VITALS
SYSTOLIC BLOOD PRESSURE: 111 MMHG | BODY MASS INDEX: 20.51 KG/M2 | WEIGHT: 120.15 LBS | HEIGHT: 64 IN | OXYGEN SATURATION: 97 % | HEART RATE: 63 BPM | TEMPERATURE: 97.7 F | DIASTOLIC BLOOD PRESSURE: 59 MMHG | RESPIRATION RATE: 18 BRPM

## 2020-01-26 DIAGNOSIS — K59.00 CONSTIPATION, UNSPECIFIED CONSTIPATION TYPE: Primary | ICD-10-CM

## 2020-01-26 DIAGNOSIS — R10.2 PELVIC PAIN: ICD-10-CM

## 2020-01-26 PROBLEM — R10.9 ABDOMINAL PAIN IN FEMALE: Status: ACTIVE | Noted: 2020-01-26

## 2020-01-26 PROBLEM — N83.202 CYST OF LEFT OVARY: Status: ACTIVE | Noted: 2020-01-26

## 2020-01-26 PROCEDURE — 99218 PR INITIAL OBSERVATION CARE/DAY 30 MINUTES: CPT | Performed by: OBSTETRICS & GYNECOLOGY

## 2020-01-26 RX ORDER — DOCUSATE SODIUM 100 MG/1
100 CAPSULE, LIQUID FILLED ORAL 2 TIMES DAILY
Qty: 10 CAPSULE | Refills: 2 | Status: SHIPPED | OUTPATIENT
Start: 2020-01-26 | End: 2022-01-20 | Stop reason: ALTCHOICE

## 2020-01-26 RX ORDER — KETOROLAC TROMETHAMINE 10 MG/1
10 TABLET, FILM COATED ORAL EVERY 6 HOURS PRN
Qty: 20 TABLET | Refills: 1 | Status: SHIPPED | OUTPATIENT
Start: 2020-01-26 | End: 2022-01-20 | Stop reason: ALTCHOICE

## 2020-01-26 NOTE — PROGRESS NOTES
The [facility] was seen this [facility]  Her pain is gone almost completely gone there is no rebound or guarding  She will follow with Dr Jasmin Zhang in his office next week

## 2020-01-26 NOTE — PLAN OF CARE
Problem: PAIN - ADULT  Goal: Verbalizes/displays adequate comfort level or baseline comfort level  Description  Interventions:  - Encourage patient to monitor pain and request assistance  - Assess pain using appropriate pain scale  - Administer analgesics based on type and severity of pain and evaluate response  - Implement non-pharmacological measures as appropriate and evaluate response  - Consider cultural and social influences on pain and pain management  - Notify physician/advanced practitioner if interventions unsuccessful or patient reports new pain  Outcome: Progressing     Problem: INFECTION - ADULT  Goal: Absence or prevention of progression during hospitalization  Description  INTERVENTIONS:  - Assess and monitor for signs and symptoms of infection  - Monitor lab/diagnostic results  - Monitor all insertion sites, i e  indwelling lines, tubes, and drains  - Monitor endotracheal if appropriate and nasal secretions for changes in amount and color  - [place] appropriate cooling/warming therapies per order  - Administer medications as ordered  - Instruct and encourage patient and family to use good hand hygiene technique  - Identify and instruct in appropriate isolation precautions for identified infection/condition  Outcome: Progressing  Goal: Absence of fever/infection during neutropenic period  Description  INTERVENTIONS:  - Monitor WBC    Outcome: Progressing     Problem: SAFETY ADULT  Goal: Patient will remain free of falls  Description  INTERVENTIONS:  - Assess patient frequently for physical needs  -  Identify cognitive and physical deficits and behaviors that affect risk of falls    -  [place] fall precautions as indicated by assessment   - Educate patient/family on patient safety including physical limitations  - Instruct patient to call for assistance with activity based on assessment  - Modify environment to reduce risk of injury  - Consider OT/PT consult to assist with strengthening/mobility  Outcome: Progressing  Goal: Maintain or return to baseline ADL function  Description  INTERVENTIONS:  -  Assess patient's ability to carry out ADLs; assess patient's baseline for ADL function and identify physical deficits which impact ability to perform ADLs (bathing, care of mouth/teeth, toileting, grooming, dressing, etc )  - Assess/evaluate cause of self-care deficits   - Assess range of motion  - Assess patient's mobility; develop plan if impaired  - Assess patient's need for assistive devices and provide as appropriate  - Encourage maximum independence but intervene and supervise when necessary  - Involve family in performance of ADLs  - Assess for home care needs following discharge   - Consider OT consult to assist with ADL evaluation and planning for discharge  - Provide patient education as appropriate  Outcome: Progressing  Goal: Maintain or return mobility status to optimal level  Description  INTERVENTIONS:  - Assess patient's baseline mobility status (ambulation, transfers, stairs, etc )    - Identify cognitive and physical deficits and behaviors that affect mobility  - Identify mobility aids required to assist with transfers and/or ambulation (gait belt, sit-to-stand, lift, walker, cane, etc )  - [place] fall precautions as indicated by assessment  - Record patient progress and toleration of activity level on Mobility SBAR; progress patient to next Phase/Stage  - Instruct patient to call for assistance with activity based on assessment  - Consider rehabilitation consult to assist with strengthening/weightbearing, etc   Outcome: Progressing     Problem: DISCHARGE PLANNING  Goal: Discharge to home or other facility with appropriate resources  Description  INTERVENTIONS:  - Identify barriers to discharge w/patient and caregiver  - Arrange for needed discharge resources and transportation as appropriate  - Identify discharge learning needs (meds, wound care, etc )  - Arrange for interpretive services to assist at discharge as needed  - Refer to Case Management Department for coordinating discharge planning if the patient needs post-hospital services based on physician/advanced practitioner order or complex needs related to functional status, cognitive ability, or social support system  Outcome: Progressing     Problem: Knowledge Deficit  Goal: Patient/family/caregiver demonstrates understanding of disease process, treatment plan, medications, and discharge instructions  Description  Complete learning assessment and assess knowledge base    Interventions:  - Provide teaching at level of understanding  - Provide teaching via preferred learning methods  Outcome: Progressing

## 2020-01-26 NOTE — DISCHARGE INSTRUCTIONS
Ovarian Cyst   WHAT YOU NEED TO KNOW:   An ovarian cyst is a sac that grows on an ovary  This sac usually contains fluid, but may sometimes have blood or tissue in it  Most ovarian cysts are harmless and go away without treatment in a few months  Some cysts can grow large, cause pain, or break open  DISCHARGE INSTRUCTIONS:   Call 911 for any of the following:   · You are too weak or dizzy to stand up  Seek care immediately if:   · You have severe abdominal pain  The pain may be sharp and sudden  · You have a fever  Contact your healthcare provider if:   · Your periods are early, late, or more painful than usual     · You have bleeding from your vagina that is not your period  · You have abdominal pain all the time  · Your abdomen is swollen  · You have feelings of fullness, pressure, or discomfort in your abdomen  · You have trouble urinating or emptying your bladder completely  · You have pain during sex  · You are losing weight without trying  · You have questions or concerns about your condition or care  Medicines: You may need any of the following:  · NSAIDs , such as ibuprofen, help decrease swelling, pain, and fever  This medicine is available with or without a doctor's order  NSAIDs can cause stomach bleeding or kidney problems in certain people  If you take blood thinner medicine, always ask if NSAIDs are safe for you  Always read the medicine label and follow directions  Do not give these medicines to children under 10months of age without direction from your child's healthcare provider  · Birth control pills  may help to control your periods, prevent cysts, or cause them to shrink  · Take your medicine as directed  Contact your healthcare provider if you think your medicine is not helping or if you have side effects  Tell him or her if you are allergic to any medicine  Keep a list of the medicines, vitamins, and herbs you take   Include the amounts, and when and why you take them  Bring the list or the pill bottles to follow-up visits  Carry your medicine list with you in case of an emergency  Follow up with your healthcare provider as directed:  Write down your questions so you remember to ask them during your visits  Apply heat to decrease pain and cramping:  Sit in a warm bath, or place a heating pad (turned on low) or a hot water bottle on your abdomen  Do this for 15 to 20 minutes every hour for as many days as directed  © 2017 [address] [employer] Information is for End User's use only and may not be sold, redistributed or otherwise used for commercial purposes  All illustrations and images included in CareNotes® are the copyrighted property of [employer]  or [employer]  The above information is an  only  It is not intended as medical advice for individual conditions or treatments  Talk to your doctor, nurse or pharmacist before following any medical regimen to see if it is safe and effective for you  Pelvic Pain   WHAT YOU NEED TO KNOW:   Pelvic pain may be caused by a number of conditions, such as irritable bowel syndrome, appendicitis, or constipation  A urinary tract infection, prostate inflammation, menstrual cramps, or kidney stones can also cause pelvic pain  You may have pain on one or both sides of your pelvis  Pelvic pain can develop if you have trauma to your pelvis or if you sit or stand for a long time  DISCHARGE INSTRUCTIONS:   Medicines: You may need any of the following:  · NSAIDs , such as ibuprofen, help decrease swelling, pain, and fever  This medicine is available with or without a doctor's order  NSAIDs can cause stomach bleeding or kidney problems in certain people  If you take blood thinner medicine, always ask your healthcare provider if NSAIDs are safe for you  Always read the medicine label and follow directions  · Prescription pain medicine  may be given   Ask how to take this medicine safely  · Birth control medicines  may help decrease pain in women  · Take your medicine as directed  Contact your healthcare provider if you think your medicine is not helping or if you have side effects  Tell him or her if you are allergic to any medicine  Keep a list of the medicines, vitamins, and herbs you take  Include the amounts, and when and why you take them  Bring the list or the pill bottles to follow-up visits  Carry your medicine list with you in case of an emergency  Call 911 for any of the following:   · You have severe chest pain and sudden trouble breathing  Contact your healthcare provider if:   · You have a fever  · You have nausea, vomiting, or diarrhea  · Your pain does not go away, or it gets worse, even after treatment  · You have numbness in your legs or toes  · You have heavy or unusual vaginal bleeding  · You have questions or concerns about your condition or care  Manage your pelvic pain:   · Keep a pain record  Write down when your pain happens and how severe it is  Include any other symptoms you have with your pain  A record will help you keep track of pain cycles  Bring the record with you to your follow-up visits  It may also help your healthcare provider find out what is causing your pain  · Learn ways to relax  Deep breathing, meditation, and relaxation techniques can help decrease your pain  When you are tense, your pain may increase  · Treat or prevent constipation  Drink liquids as directed  You may need to drink more liquid than usual  Ask your healthcare provider how much liquid to drink each day  Eat high-fiber foods  High-fiber foods include fruit, vegetables, whole-grain breads and cereals, and beans  You may need to change the foods you eat if you have irritable bowel syndrome  Follow up with your healthcare provider as directed: You may need physical therapy   You may need to see an orthopedic specialist  Write down your questions so you remember to ask them during your visits  © 2017 [employer] Information is for End User's use only and may not be sold, redistributed or otherwise used for commercial purposes  All illustrations and images included in CareNotes® are the copyrighted property of [employer] Inc  or [employer]ouss[employer]  The above information is an  only  It is not intended as medical advice for individual conditions or treatments  Talk to your doctor, nurse or pharmacist before following any medical regimen to see if it is safe and effective for you

## 2020-01-27 ENCOUNTER — TRANSITIONAL CARE MANAGEMENT (OUTPATIENT)
Dept: FAMILY MEDICINE CLINIC | Facility: CLINIC | Age: 45
End: 2020-01-27

## 2020-07-15 ENCOUNTER — APPOINTMENT (EMERGENCY)
Dept: RADIOLOGY | Facility: HOSPITAL | Age: 45
End: 2020-07-15

## 2020-07-15 ENCOUNTER — HOSPITAL ENCOUNTER (EMERGENCY)
Facility: HOSPITAL | Age: 45
Discharge: HOME | End: 2020-07-15
Attending: EMERGENCY MEDICINE | Admitting: EMERGENCY MEDICINE

## 2020-07-15 VITALS
TEMPERATURE: 98.4 F | SYSTOLIC BLOOD PRESSURE: 124 MMHG | OXYGEN SATURATION: 98 % | HEART RATE: 65 BPM | DIASTOLIC BLOOD PRESSURE: 78 MMHG | RESPIRATION RATE: 18 BRPM

## 2020-07-15 DIAGNOSIS — R10.31 RIGHT LOWER QUADRANT ABDOMINAL PAIN: Primary | ICD-10-CM

## 2020-07-15 LAB
ANION GAP SERPL CALCULATED.3IONS-SCNC: 5 MMOL/L (ref 4–13)
BASOPHILS # BLD AUTO: 0.04 THOUSANDS/ΜL (ref 0–0.1)
BASOPHILS NFR BLD AUTO: 1 % (ref 0–1)
BILIRUB UR QL STRIP: NEGATIVE
BUN SERPL-MCNC: 17 MG/DL (ref 5–25)
CALCIUM SERPL-MCNC: 8.4 MG/DL (ref 8.3–10.1)
CHLORIDE SERPL-SCNC: 106 MMOL/L (ref 100–108)
CLARITY UR: CLEAR
CO2 SERPL-SCNC: 28 MMOL/L (ref 21–32)
COLOR UR: YELLOW
COLOR, POC: NORMAL
CREAT SERPL-MCNC: 0.63 MG/DL (ref 0.6–1.3)
EOSINOPHIL # BLD AUTO: 0.13 THOUSAND/ΜL (ref 0–0.61)
EOSINOPHIL NFR BLD AUTO: 2 % (ref 0–6)
ERYTHROCYTE [DISTWIDTH] IN BLOOD BY AUTOMATED COUNT: 13.6 % (ref 11.6–15.1)
GFR SERPL CREATININE-BSD FRML MDRD: 109 ML/MIN/1.73SQ M
GLUCOSE SERPL-MCNC: 107 MG/DL (ref 65–140)
GLUCOSE UR STRIP-MCNC: NEGATIVE MG/DL
HCG SERPL QL: NEGATIVE
HCT VFR BLD AUTO: 37.5 % (ref 34.8–46.1)
HGB BLD-MCNC: 12.5 G/DL (ref 11.5–15.4)
HGB UR QL STRIP.AUTO: NEGATIVE
IMM GRANULOCYTES # BLD AUTO: 0.01 THOUSAND/UL (ref 0–0.2)
IMM GRANULOCYTES NFR BLD AUTO: 0 % (ref 0–2)
KETONES UR STRIP-MCNC: ABNORMAL MG/DL
LEUKOCYTE ESTERASE UR QL STRIP: NEGATIVE
LYMPHOCYTES # BLD AUTO: 1.36 THOUSANDS/ΜL (ref 0.6–4.47)
LYMPHOCYTES NFR BLD AUTO: 22 % (ref 14–44)
MCH RBC QN AUTO: 30.4 PG (ref 26.8–34.3)
MCHC RBC AUTO-ENTMCNC: 33.3 G/DL (ref 31.4–37.4)
MCV RBC AUTO: 91 FL (ref 82–98)
MONOCYTES # BLD AUTO: 0.71 THOUSAND/ΜL (ref 0.17–1.22)
MONOCYTES NFR BLD AUTO: 12 % (ref 4–12)
NEUTROPHILS # BLD AUTO: 3.85 THOUSANDS/ΜL (ref 1.85–7.62)
NEUTS SEG NFR BLD AUTO: 63 % (ref 43–75)
NITRITE UR QL STRIP: NEGATIVE
NRBC BLD AUTO-RTO: 0 /100 WBCS
PH UR STRIP.AUTO: 6 [PH] (ref 4.5–8)
PLATELET # BLD AUTO: 265 THOUSANDS/UL (ref 149–390)
PMV BLD AUTO: 10.8 FL (ref 8.9–12.7)
POTASSIUM SERPL-SCNC: 3.7 MMOL/L (ref 3.5–5.3)
PROT UR STRIP-MCNC: NEGATIVE MG/DL
RBC # BLD AUTO: 4.11 MILLION/UL (ref 3.81–5.12)
SODIUM SERPL-SCNC: 139 MMOL/L (ref 136–145)
SP GR UR STRIP.AUTO: >=1.03 (ref 1–1.03)
UROBILINOGEN UR QL STRIP.AUTO: 0.2 E.U./DL
WBC # BLD AUTO: 6.1 THOUSAND/UL (ref 4.31–10.16)

## 2020-07-15 PROCEDURE — 81003 URINALYSIS AUTO W/O SCOPE: CPT

## 2020-07-15 PROCEDURE — 85025 COMPLETE CBC W/AUTO DIFF WBC: CPT | Performed by: EMERGENCY MEDICINE

## 2020-07-15 PROCEDURE — 36415 COLL VENOUS BLD VENIPUNCTURE: CPT | Performed by: EMERGENCY MEDICINE

## 2020-07-15 PROCEDURE — 96374 THER/PROPH/DIAG INJ IV PUSH: CPT

## 2020-07-15 PROCEDURE — 74177 CT ABD & PELVIS W/CONTRAST: CPT

## 2020-07-15 PROCEDURE — 99284 EMERGENCY DEPT VISIT MOD MDM: CPT

## 2020-07-15 PROCEDURE — 84703 CHORIONIC GONADOTROPIN ASSAY: CPT | Performed by: EMERGENCY MEDICINE

## 2020-07-15 PROCEDURE — 80048 BASIC METABOLIC PNL TOTAL CA: CPT | Performed by: EMERGENCY MEDICINE

## 2020-07-15 PROCEDURE — 99284 EMERGENCY DEPT VISIT MOD MDM: CPT | Performed by: EMERGENCY MEDICINE

## 2020-07-15 RX ORDER — KETOROLAC TROMETHAMINE 10 MG/1
10 TABLET, FILM COATED ORAL ONCE
Status: DISCONTINUED | OUTPATIENT
Start: 2020-07-15 | End: 2020-07-15

## 2020-07-15 RX ORDER — KETOROLAC TROMETHAMINE 30 MG/ML
15 INJECTION, SOLUTION INTRAMUSCULAR; INTRAVENOUS ONCE
Status: COMPLETED | OUTPATIENT
Start: 2020-07-15 | End: 2020-07-15

## 2020-07-15 RX ADMIN — KETOROLAC TROMETHAMINE 15 MG: 30 INJECTION, SOLUTION INTRAMUSCULAR at 01:49

## 2020-07-15 RX ADMIN — IOHEXOL 100 ML: 350 INJECTION, SOLUTION INTRAVENOUS at 02:08

## 2020-07-15 NOTE — ED CARE HANDOFF
Emergency Department Sign Out Note        Sign out and transfer of care from Dr Deb España  See Separate Emergency Department note  The patient, Zoraida Moreno, was evaluated by the previous provider for RLQ abdominal pain  Workup Completed:  UA, hcg, BMP, CBC    ED Course / Workup Pending (followup):  CT abd/pelvis with IV contrast       ED Course as of Jul 15 0342   Wed Jul 15, 2020   0213 RLQ abd pain, imaging pending       0327 Patient reevaluated  On exam she is non-tender  She notes improvement in her symptoms after Toradol  Discussed return precautions, and told her she needs to f/u with pcp and obgyn  Patient verbalized and understanding, will return as needed  Procedures  MDM    Disposition  Final diagnoses:   Right lower quadrant abdominal pain     Time reflects when diagnosis was documented in both MDM as applicable and the Disposition within this note     Time User Action Codes Description Comment    7/15/2020  3:23 AM Walter Gray Add [R10 31] Right lower quadrant abdominal pain       ED Disposition     ED Disposition Condition Date/Time Comment    Discharge Stable Wed Jul 15, 2020  3:23 AM Zoraida Moreno discharge to home/self care  Follow-up Information     Follow up With Specialties Details Why Contact Info Additional [address] Emergency Department Emergency Medicine Go to  As needed, If symptoms worsen [address]  [phone]  ED, [address] [place] [address]    [address]  Call  establish care with a primary care doctor [phone]           Patient's Medications   Discharge Prescriptions    No medications on file     No discharge procedures on file         ED Provider  Electronically Signed by     Lauren Merino DO  07/15/20 4484

## 2020-07-15 NOTE — ED PROVIDER NOTES
History  Chief Complaint   Patient presents with    Abdominal Pain     Pt reports L sided abdominal pain starting this morning, pt reports urinary frequency, denies radiation of pain, denies N/V/D; pt has hx of ovarian cyst and states the pain feels similar     Patient is a 41 old female presents with pain  Patient states starting this morning she had a left lower quadrant abdominal pain that woke her up from sleep  She rates it a 7 to 8/10 without pain medication and with Tylenol goes down to a 5 to 6 out of 10  Patient states she has similar pain on a day-to-day basis that is only a 1 to 2/10 but today is significantly worse  Patient states that several days ago she had increased frequency of urination without any other urinary symptoms but otherwise review of systems is completely negative  Patient has a left-sided ovarian cyst that has caused her similar pain in the past   Patient believes symptoms are caused by parents today as  Patient denies any recent illness including fevers, chills, shortness of breath, cough  Patient is otherwise healthy and is a nonsmoker, non recreational drug user and social drinker  Prior to Admission Medications   Prescriptions Last Dose Informant Patient Reported? Taking?    albuterol (5 mg/mL) 0 5 % nebulizer solution  Self Yes No   Sig: Take 2 5 mg by nebulization every 6 (six) hours as needed for wheezing   albuterol (VENTOLIN HFA) 90 mcg/act inhaler   Yes No   Sig: Inhale 2 puffs   cyclobenzaprine (FLEXERIL) 5 mg tablet   Yes No   Sig: Take 1 tablet by mouth   docusate sodium (COLACE) 100 mg capsule   No No   Sig: Take 1 capsule (100 mg total) by mouth 2 (two) times a day   ketorolac (TORADOL) 10 mg tablet   No No   Sig: Take 1 tablet (10 mg total) by mouth every 6 (six) hours as needed for moderate pain      Facility-Administered Medications: None       Past Medical History:   Diagnosis Date    Asthma     Back pain     History of thyroid disorder     History of tinea cruris     History of vaginal discharge     Microscopic hematuria     History    Papilloma of tongue     History    Vaginal candidiasis     History       History reviewed  No pertinent surgical history  Family History   Problem Relation Age of Onset    Hypertension Mother     Mental illness Mother         Disorder    Substance Abuse Mother     Breast cancer Mother 27    Thyroid cancer Mother         Metastasis from thyroid cancer    Ovarian cancer Mother 48    Diabetes type I Mother         mellitus with other kidney complication    Diabetes type II Mother         without complication, without long term current use of insulin     Cancer Father     Hypertension Maternal Grandmother     Cancer Paternal Grandfather     Ovarian cancer Maternal Aunt      I have reviewed and agree with the history as documented  E-Cigarette/Vaping    E-Cigarette Use Never User      E-Cigarette/Vaping Substances     Social History     Tobacco Use    Smoking status: Never Smoker    Smokeless tobacco: Never Used   Substance Use Topics    Alcohol use: No    Drug use: No        Review of Systems   Constitutional: Negative for activity change, appetite change, chills, diaphoresis and fever  HENT: Negative for congestion, ear discharge, ear pain, sinus pressure, sinus pain and sore throat  Eyes: Negative for pain and redness  Respiratory: Negative for cough, chest tightness, shortness of breath and wheezing  Cardiovascular: Negative for chest pain, palpitations and leg swelling  Gastrointestinal: Positive for abdominal pain (LLQ and RLQ) and diarrhea (1 bout this AM; non-bloody)  Negative for abdominal distention, nausea and vomiting  Endocrine: Negative for cold intolerance and heat intolerance  Genitourinary: Positive for frequency  Negative for dysuria, hematuria and urgency  Musculoskeletal: Negative for arthralgias, back pain, gait problem, neck pain and neck stiffness     Skin: Negative for color change, pallor, rash and wound  Neurological: Negative for dizziness, tremors, seizures, syncope, weakness, light-headedness, numbness and headaches  Psychiatric/Behavioral: Negative for agitation, behavioral problems and confusion  The patient is not nervous/anxious  Physical Exam  ED Triage Vitals [07/15/20 0026]   Temperature Pulse Respirations Blood Pressure SpO2   98 4 °F (36 9 °C) 72 18 127/75 98 %      Temp Source Heart Rate Source Patient Position - Orthostatic VS BP Location FiO2 (%)   Oral Monitor Lying Right arm --      Pain Score       7             Orthostatic Vital Signs  Vitals:    07/15/20 0026 07/15/20 0200 07/15/20 0330   BP: 127/75 119/76 124/78   Pulse: 72 62 65   Patient Position - Orthostatic VS: Lying Lying Lying       Physical Exam   Constitutional: She is oriented to person, place, and time  She appears well-developed and well-nourished  Non-toxic appearance  She does not appear ill  No distress  HENT:   Head: Normocephalic and atraumatic  Right Ear: External ear normal    Left Ear: External ear normal    Nose: Nose normal    Mouth/Throat: Oropharynx is clear and moist    Eyes: Pupils are equal, round, and reactive to light  Conjunctivae and EOM are normal    Neck: Normal range of motion  Neck supple  Cardiovascular: Normal rate, regular rhythm, normal heart sounds and intact distal pulses  No murmur heard  Pulmonary/Chest: Effort normal and breath sounds normal  No respiratory distress  She has no wheezes  Abdominal: Soft  Normal appearance and bowel sounds are normal  She exhibits no distension and no mass  There is tenderness (R>L) in the right lower quadrant and left lower quadrant  There is no rigidity and no guarding  Musculoskeletal: Normal range of motion  Neurological: She is alert and oriented to person, place, and time  Skin: Skin is warm and dry  Capillary refill takes less than 2 seconds  No rash noted  She is not diaphoretic  No cyanosis  Psychiatric: She has a normal mood and affect  Her behavior is normal  Judgment and thought content normal    Vitals reviewed        ED Medications  Medications   ketorolac (TORADOL) injection 15 mg (15 mg Intravenous Given 7/15/20 0149)   iohexol (OMNIPAQUE) 350 MG/ML injection (MULTI-DOSE) 100 mL (100 mL Intravenous Given 7/15/20 0208)       Diagnostic Studies  Results Reviewed     Procedure Component Value Units Date/Time    Basic metabolic panel [[id number]] Collected:  07/15/20 0108    Lab Status:  Final result Specimen:  Blood from Arm, Left Updated:  07/15/20 0141     Sodium 139 mmol/L      Potassium 3 7 mmol/L      Chloride 106 mmol/L      CO2 28 mmol/L      ANION GAP 5 mmol/L      BUN 17 mg/dL      Creatinine 0 63 mg/dL      Glucose 107 mg/dL      Calcium 8 4 mg/dL      eGFR 109 ml/min/1 73sq m     Narrative:       Jojo guidelines for Chronic Kidney Disease (CKD):     Stage 1 with normal or high GFR (GFR > 90 mL/min/1 73 square meters)    Stage 2 Mild CKD (GFR = 60-89 mL/min/1 73 square meters)    Stage 3A Moderate CKD (GFR = 45-59 mL/min/1 73 square meters)    Stage 3B Moderate CKD (GFR = 30-44 mL/min/1 73 square meters)    Stage 4 Severe CKD (GFR = 15-29 mL/min/1 73 square meters)    Stage 5 End Stage CKD (GFR <15 mL/min/1 73 square meters)  Note: GFR calculation is accurate only with a steady state creatinine    hCG, qualitative pregnancy [[id number]]  (Normal) Collected:  07/15/20 0108    Lab Status:  Final result Specimen:  Blood from Arm, Left Updated:  07/15/20 0141     Preg, Serum Negative    CBC and differential [[id number]] Collected:  07/15/20 0108    Lab Status:  Final result Specimen:  Blood from Arm, Left Updated:  07/15/20 0120     WBC 6 10 Thousand/uL      RBC 4 11 Million/uL      Hemoglobin 12 5 g/dL      Hematocrit 37 5 %      MCV 91 fL      MCH 30 4 pg      MCHC 33 3 g/dL      RDW 13 6 %      MPV 10 8 fL      Platelets 599 Thousands/uL      nRBC 0 /100 WBCs      Neutrophils Relative 63 %      Immat GRANS % 0 %      Lymphocytes Relative 22 %      Monocytes Relative 12 %      Eosinophils Relative 2 %      Basophils Relative 1 %      Neutrophils Absolute 3 85 Thousands/µL      Immature Grans Absolute 0 01 Thousand/uL      Lymphocytes Absolute 1 36 Thousands/µL      Monocytes Absolute 0 71 Thousand/µL      Eosinophils Absolute 0 13 Thousand/µL      Basophils Absolute 0 04 Thousands/µL     POCT urinalysis dipstick [[id number]]  (Normal) Resulted:  07/15/20 0115    Lab Status:  Final result Specimen:  Urine Updated:  07/15/20 0115     Color, UA See chart    Urine Macroscopic, POC [[id number]]  (Abnormal) Collected:  07/15/20 0113    Lab Status:  Final result Specimen:  Urine Updated:  07/15/20 0112     Color, UA Yellow     Clarity, UA Clear     pH, UA 6 0     Leukocytes, UA Negative     Nitrite, UA Negative     Protein, UA Negative mg/dl      Glucose, UA Negative mg/dl      Ketones, UA Trace mg/dl      Urobilinogen, UA 0 2 E U /dl      Bilirubin, UA Negative     Blood, UA Negative     Specific Gravity, UA >=1 030    Narrative:       [facility] RESULT                 CT abdomen pelvis with contrast   Final Result by Ameya Ibrahim MD (07/15 0319)      No acute inflammatory process identified within the abdomen or pelvis  Workstation performed: [id number]               Procedures  Procedures      ED Course  ED Course as of Jul 15 1210   Wed Jul 15, 2020   0114 No evidence of infection or blood that may indicate nephrolithiasis  Urine Macroscopic, POC(!)   0120 Reviewed and without derangement  No evidence of leukocytosis indicating infection  CBC and differential   0141 Reviewed and without derangement  No intervention required  Basic metabolic panel   6835 Pregnancy negative  Will proceed with CT  hCG, qualitative pregnancy   0212 Pt states that toradol has improved her pain  Discussed results thus far with pt  Awaiting CT             US AUDIT      Most Recent Value   Initial Alcohol Screen: US AUDIT-C    1  How often do you have a drink containing alcohol?  0 Filed at: 07/15/2020 0026   2  How many drinks containing alcohol do you have on a typical day you are drinking? 0 Filed at: 07/15/2020 0026   3a  Male UNDER 65: How often do you have five or more drinks on one occasion? 0 Filed at: 07/15/2020 0026   3b  FEMALE Any Age, or MALE 65+: How often do you have 4 or more drinks on one occassion? 0 Filed at: 07/15/2020 0026   Audit-C Score  0 Filed at: 07/15/2020 0026                  RAMONA/DAST-10      Most Recent Value   How many times in the past year have you    Used an illegal drug or used a prescription medication for non-medical reasons? Never Filed at: 07/15/2020 0026                              MDM  Number of Diagnoses or Management Options  Right lower quadrant abdominal pain: new and requires workup  Diagnosis management comments: Patient is a 27-year-old female who presents with lower abdominal pain  Exam pertinent for right lower quadrant tenderness and left lower quadrant tenderness, worse on the right  Differential diagnosis to include but not limited to ovarian cyst, appendicitis, ovarian torsion, nephrolithiasis, urinary tract infection, diverticulitis, pregnancy  Labs and urine show no indication of nephrolithiasis, urinary tract infection, or pregnancy  Awaiting CT to assess for diverticulitis, appendicitis  CT showed no evidence of inflammatory process likely ruling out appendicitis and diverticulitis  Ovaries were unremarkable making torsion less likely as well  Will discharge pt with plan to follow up with PCP for further care          Amount and/or Complexity of Data Reviewed  Clinical lab tests: ordered and reviewed  Tests in the radiology section of CPT®: ordered and reviewed  Discussion of test results with the performing providers: yes          Disposition  Final diagnoses:   Right lower quadrant abdominal pain     Time reflects when diagnosis was documented in both MDM as applicable and the Disposition within this note     Time User Action Codes Description Comment    7/15/2020  3:23 AM Rubin Bosch Add [R10 31] Right lower quadrant abdominal pain       ED Disposition     ED Disposition Condition Date/Time Comment    Discharge Stable Wed Jul 15, 2020  3:23 AM Moriah Sultana discharge to home/self care  Follow-up Information     Follow up With Specialties Details Why [address] [facility] Emergency Department Emergency Medicine Go to  As needed, If symptoms worsen [address] [facility] [address]  [facility] ED, [address]    [address] [facility] [address]  Call  establish care with a primary care doctor [phone]             Discharge Medication List as of 7/15/2020  3:24 AM      CONTINUE these medications which have NOT CHANGED    Details   albuterol (5 mg/mL) 0 5 % nebulizer solution Take 2 5 mg by nebulization every 6 (six) hours as needed for wheezing, Historical Med      albuterol (VENTOLIN HFA) 90 mcg/act inhaler Inhale 2 puffs, Starting Mon 12/19/2011, Historical Med      cyclobenzaprine (FLEXERIL) 5 mg tablet Take 1 tablet by mouth, Starting Mon 10/6/2014, Historical Med      docusate sodium (COLACE) 100 mg capsule Take 1 capsule (100 mg total) by mouth 2 (two) times a day, Starting Sun 1/26/2020, Normal      ketorolac (TORADOL) 10 mg tablet Take 1 tablet (10 mg total) by mouth every 6 (six) hours as needed for moderate pain, Starting Sun 1/26/2020, Normal           No discharge procedures on file  PDMP Review     None           ED Provider  Attending physically available and evaluated Moriah Sultana  I managed the patient along with the ED Attending      Electronically Signed by         Christiano Connor MD  07/15/20 6149

## 2020-07-15 NOTE — ED ATTENDING ATTESTATION
7/15/2020  Nakul Brady DO, saw and evaluated the patient  I have discussed the patient with the resident/non-physician practitioner and agree with the resident's/non-physician practitioner's findings, Plan of Care, and MDM as documented in the resident's/non-physician practitioner's note, except where noted  All available labs and Radiology studies were reviewed  I was present for key portions of any procedure(s) performed by the resident/non-physician practitioner and I was immediately available to provide assistance  At this point I agree with the current assessment done in the Emergency Department  I have conducted an independent evaluation of this patient a history and physical is as follows:    40 yo female c/o  LLQ abd pain, progressively worse throughout the day  Tylenol helps a bit  Pain 7-8/10 down to 5-6/10 with tylenol  Denies f/c/n/v, vag dc or bleeding  Reports some increased urinary frequency  Denies dysuria  Symptoms constant, wax and wane  No other c/o at this time  abd snd tender to deep palpation R pelvis/RLQ > LLQ  Imp: lower abd pain RLQ > LLQ  plan: UA, upreg, BMP, CBC  CT abd/pelvis w/contrast  Offer analgesia      ED Course         Critical Care Time  Procedures

## 2020-09-05 NOTE — PROGRESS NOTES
Assessment/Plan:       Diagnoses and all orders for this visit:    Thyroid nodule  -     TSH, 3rd generation with T4 reflex; Future    Abnormal ultrasound of thyroid gland  -     TSH, 3rd generation with T4 reflex; Future    Vitamin D deficiency  -     Vitamin D 1,25 dihydroxy; Future    Sacroiliac joint dysfunction of both sides  -     meloxicam (MOBIC) 15 mg tablet; Take 1 tablet (15 mg total) by mouth daily  -     Ambulatory referral to Physical Therapy; Future    Other orders  -     naphazoline-pheniramine (NAPHCON-A) 0 025-0 3 % ophthalmic solution; Apply 1 drop to eye 2 (two) times a day  -     cyclobenzaprine (FLEXERIL) 5 mg tablet; Take 1 tablet by mouth  -     calcium-vitamin D (OSCAL 500/200 D-3) 500 mg-200 units per tablet; Take 1 tablet by mouth daily  -     oxyCODONE (ROXICODONE) 5 mg/5 mL solution; Take 1 tablet by mouth  -     predniSONE 20 mg tablet; Take by mouth  -     traMADol (ULTRAM) 50 mg tablet; Take by mouth  -     albuterol (VENTOLIN HFA) 90 mcg/act inhaler; Inhale 2 puffs        Low Back Pain- Appears to be originating from the SI joints  Discussed treatment options  Will start on meloxicam daily  Take with plenty of fluids and food  Take this once daily for 1 week, then as needed  Will also send for PT referral     Thyroid Nodule- US done in January showed sub-centimeter nodule not meeting criteria for biopsy  Will check TSH  Last value was WNL  Vitamin D deficiency- Completed course of replacement  Will check Vitamin D levels  Subjective:      Patient ID: Petrona Guillory is a 44 y o  female  43 yo F with history of nephrolithiasis and renal cysts comes to the office with concerns of low back pain  Pain is worse on the right when compared to the left  Pain is a constant, dull, aching pain  There is no numbness, tingling or weakness  No changes in her bowel or bladder continence  No radiating pain into the groin      She also has a history of thyroid nodule which was Bedside shift change report given to ZOE Foley (oncoming nurse) by ZOE Skinner (offgoing nurse). Report included the following information SBAR, Kardex, Intake/Output, MAR, Accordion and Cardiac Rhythm NSR. last ultrasounded earlier this year  There is a subcentimeter nodule that does not meet recommendations for biopsy  Her TSH was last checked in January as well and was WNL  She also completed a course of Vitamin D replacement in January  It has not been checked since her replacement  Back Pain         The following portions of the patient's history were reviewed and updated as appropriate: allergies, current medications, past family history, past medical history, past social history, past surgical history and problem list     Review of Systems   Constitutional: Negative  HENT: Negative  Eyes: Negative  Respiratory: Negative  Cardiovascular: Negative  Gastrointestinal: Negative  Endocrine: Negative  Genitourinary: Negative  Musculoskeletal: Positive for back pain  Skin: Negative  Allergic/Immunologic: Negative  Neurological: Negative  Hematological: Negative  Psychiatric/Behavioral: Negative  Objective:      /68 (BP Location: Left arm, Patient Position: Sitting, Cuff Size: Standard)   Pulse 72   Temp 97 7 °F (36 5 °C) (Tympanic)   Resp 14   Ht 5' 4" (1 626 m)   Wt 50 9 kg (112 lb 3 2 oz)   BMI 19 26 kg/m²          Physical Exam   Constitutional: She is oriented to person, place, and time  She appears well-developed and well-nourished  No distress  HENT:   Head: Normocephalic and atraumatic  Right Ear: External ear normal    Left Ear: External ear normal    Eyes: Conjunctivae and EOM are normal  Pupils are equal, round, and reactive to light  Right eye exhibits no discharge  Left eye exhibits no discharge  No scleral icterus  Neck: Normal range of motion  Neck supple  Cardiovascular: Intact distal pulses  Pulmonary/Chest: Effort normal  No respiratory distress  Abdominal: Soft  Bowel sounds are normal    Musculoskeletal: Normal range of motion  TTP along the SI joints bilaterally, worse on the right compared to the left    Negative SLR bilaterally  Full motor and sensation of bilateral lower extremities  MAAME test positive in right SIJ  Neurological: She is alert and oriented to person, place, and time  She has normal reflexes  Skin: Skin is warm and dry  She is not diaphoretic  Psychiatric: She has a normal mood and affect   Her behavior is normal  Judgment and thought content normal

## 2020-10-08 ENCOUNTER — ANNUAL EXAM (OUTPATIENT)
Dept: OBGYN CLINIC | Facility: CLINIC | Age: 45
End: 2020-10-08

## 2020-10-08 VITALS
TEMPERATURE: 97.6 F | HEIGHT: 64 IN | DIASTOLIC BLOOD PRESSURE: 76 MMHG | SYSTOLIC BLOOD PRESSURE: 120 MMHG | WEIGHT: 125 LBS | BODY MASS INDEX: 21.34 KG/M2

## 2020-10-08 DIAGNOSIS — Z01.411 ENCOUNTER FOR GYNECOLOGICAL EXAMINATION WITH ABNORMAL FINDING: Primary | ICD-10-CM

## 2020-10-08 DIAGNOSIS — Z12.31 ENCOUNTER FOR SCREENING MAMMOGRAM FOR MALIGNANT NEOPLASM OF BREAST: ICD-10-CM

## 2020-10-08 DIAGNOSIS — Z11.3 SCREEN FOR STD (SEXUALLY TRANSMITTED DISEASE): ICD-10-CM

## 2020-10-08 DIAGNOSIS — R10.2 PELVIC PAIN: ICD-10-CM

## 2020-10-08 DIAGNOSIS — N92.6 IRREGULAR MENSES: ICD-10-CM

## 2020-10-08 PROCEDURE — 87491 CHLMYD TRACH DNA AMP PROBE: CPT | Performed by: PHYSICIAN ASSISTANT

## 2020-10-08 PROCEDURE — 99396 PREV VISIT EST AGE 40-64: CPT | Performed by: PHYSICIAN ASSISTANT

## 2020-10-08 PROCEDURE — 87624 HPV HI-RISK TYP POOLED RSLT: CPT | Performed by: PHYSICIAN ASSISTANT

## 2020-10-08 PROCEDURE — 87591 N.GONORRHOEAE DNA AMP PROB: CPT | Performed by: PHYSICIAN ASSISTANT

## 2020-10-08 PROCEDURE — G0124 SCREEN C/V THIN LAYER BY MD: HCPCS | Performed by: PATHOLOGY

## 2020-10-08 PROCEDURE — G0145 SCR C/V CYTO,THINLAYER,RESCR: HCPCS | Performed by: PATHOLOGY

## 2020-10-08 RX ORDER — MEDROXYPROGESTERONE ACETATE 150 MG/ML
150 INJECTION, SUSPENSION INTRAMUSCULAR
Qty: 1 ML | Refills: 3 | Status: SHIPPED | OUTPATIENT
Start: 2020-10-08 | End: 2022-01-20 | Stop reason: ALTCHOICE

## 2020-10-09 ENCOUNTER — CLINICAL SUPPORT (OUTPATIENT)
Dept: OBGYN CLINIC | Facility: CLINIC | Age: 45
End: 2020-10-09

## 2020-10-09 VITALS
SYSTOLIC BLOOD PRESSURE: 120 MMHG | BODY MASS INDEX: 21.34 KG/M2 | TEMPERATURE: 98 F | DIASTOLIC BLOOD PRESSURE: 74 MMHG | WEIGHT: 125 LBS | HEIGHT: 64 IN

## 2020-10-09 DIAGNOSIS — Z30.42 DEPO-PROVERA CONTRACEPTIVE STATUS: Primary | ICD-10-CM

## 2020-10-09 LAB — SL AMB POCT URINE HCG: NEGATIVE

## 2020-10-09 PROCEDURE — 96372 THER/PROPH/DIAG INJ SC/IM: CPT | Performed by: OBSTETRICS & GYNECOLOGY

## 2020-10-09 PROCEDURE — 81025 URINE PREGNANCY TEST: CPT | Performed by: OBSTETRICS & GYNECOLOGY

## 2020-10-09 RX ORDER — MEDROXYPROGESTERONE ACETATE 150 MG/ML
150 INJECTION, SUSPENSION INTRAMUSCULAR ONCE
Status: COMPLETED | OUTPATIENT
Start: 2020-10-09 | End: 2020-10-09

## 2020-10-09 RX ADMIN — MEDROXYPROGESTERONE ACETATE 150 MG: 150 INJECTION, SUSPENSION INTRAMUSCULAR at 08:57

## 2020-10-12 LAB
C TRACH DNA SPEC QL NAA+PROBE: NEGATIVE
HPV HR 12 DNA CVX QL NAA+PROBE: POSITIVE
HPV16 DNA CVX QL NAA+PROBE: NEGATIVE
HPV18 DNA CVX QL NAA+PROBE: NEGATIVE
N GONORRHOEA DNA SPEC QL NAA+PROBE: NEGATIVE

## 2020-10-19 LAB
LAB AP GYN PRIMARY INTERPRETATION: ABNORMAL
Lab: ABNORMAL
PATH INTERP SPEC-IMP: ABNORMAL

## 2020-10-22 ENCOUNTER — TELEPHONE (OUTPATIENT)
Dept: OBGYN CLINIC | Facility: CLINIC | Age: 45
End: 2020-10-22

## 2020-10-22 DIAGNOSIS — B96.89 BV (BACTERIAL VAGINOSIS): Primary | ICD-10-CM

## 2020-10-22 DIAGNOSIS — N76.0 BV (BACTERIAL VAGINOSIS): Primary | ICD-10-CM

## 2020-10-22 RX ORDER — METRONIDAZOLE 500 MG/1
500 TABLET ORAL EVERY 12 HOURS SCHEDULED
Qty: 10 TABLET | Refills: 0 | Status: SHIPPED | OUTPATIENT
Start: 2020-10-22 | End: 2020-10-27

## 2020-10-26 ENCOUNTER — PROCEDURE VISIT (OUTPATIENT)
Dept: OBGYN CLINIC | Facility: CLINIC | Age: 45
End: 2020-10-26

## 2020-10-26 VITALS
DIASTOLIC BLOOD PRESSURE: 78 MMHG | TEMPERATURE: 98 F | BODY MASS INDEX: 21.51 KG/M2 | SYSTOLIC BLOOD PRESSURE: 120 MMHG | WEIGHT: 126 LBS | HEIGHT: 64 IN

## 2020-10-26 DIAGNOSIS — R87.610 ASCUS WITH POSITIVE HIGH RISK HPV CERVICAL: Primary | ICD-10-CM

## 2020-10-26 DIAGNOSIS — R87.810 ASCUS WITH POSITIVE HIGH RISK HPV CERVICAL: Primary | ICD-10-CM

## 2020-10-26 PROCEDURE — 88305 TISSUE EXAM BY PATHOLOGIST: CPT | Performed by: PATHOLOGY

## 2020-10-26 PROCEDURE — 57454 BX/CURETT OF CERVIX W/SCOPE: CPT | Performed by: OBSTETRICS & GYNECOLOGY

## 2020-10-26 PROCEDURE — 88344 IMHCHEM/IMCYTCHM EA MLT ANTB: CPT | Performed by: PATHOLOGY

## 2020-11-03 ENCOUNTER — TELEPHONE (OUTPATIENT)
Dept: OBGYN CLINIC | Facility: CLINIC | Age: 45
End: 2020-11-03

## 2021-04-16 ENCOUNTER — IMMUNIZATIONS (OUTPATIENT)
Dept: FAMILY MEDICINE CLINIC | Facility: HOSPITAL | Age: 46
End: 2021-04-16

## 2021-04-16 DIAGNOSIS — Z23 ENCOUNTER FOR IMMUNIZATION: Primary | ICD-10-CM

## 2021-04-16 PROCEDURE — 0001A SARS-COV-2 / COVID-19 MRNA VACCINE (PFIZER-BIONTECH) 30 MCG: CPT

## 2021-04-16 PROCEDURE — 91300 SARS-COV-2 / COVID-19 MRNA VACCINE (PFIZER-BIONTECH) 30 MCG: CPT

## 2021-05-08 ENCOUNTER — NURSE TRIAGE (OUTPATIENT)
Dept: OTHER | Facility: OTHER | Age: 46
End: 2021-05-08

## 2021-05-08 NOTE — TELEPHONE ENCOUNTER
Reason for Disposition   [1] COVID-19 infection suspected by caller or triager AND [2] mild symptoms (cough, fever, or others) AND [3] no complications or SOB    Protocols used: CORONAVIRUS (COVID-19) DIAGNOSED OR SUSPECTED-[facility]

## 2021-05-08 NOTE — TELEPHONE ENCOUNTER
Pt is scheduled for her COVID booster tomorrow  Says she was exposed to someone with COVID and was showing some symptoms- nasal congestion, feverish-  States she decided to get tested at [facility] but still has not received her COVID results  She is uncertain if she should still get her COVID booster tomorrow  Let pt know that I could cancel her appt for her booster since she is still waiting on her COVID test results  Explained that is is generally recommended to isolate if possibly showing COVID like symptoms with an exposure, especially if she is still awaiting test results  Pt verbalized understanding  Was emailed information so she can activate a my chart to reschedule her booster  Pt also has the phone number to [facility]

## 2021-05-08 NOTE — TELEPHONE ENCOUNTER
Regardinnd Vaccine shot - Awaiting Covid Results  ----- Message from [facility] sent at 2021  4:15 PM EDT -----  " I am supposed to have my second covid vaccination tomorrow  I also was just tested for covid because of exposure and symptoms I was having  Should I wait for my results and then get the shot or just get the shot anyways?  I am confused on what I should do "

## 2021-05-13 ENCOUNTER — OFFICE VISIT (OUTPATIENT)
Dept: URGENT CARE | Age: 46
End: 2021-05-13

## 2021-05-13 VITALS
RESPIRATION RATE: 20 BRPM | TEMPERATURE: 97.3 F | DIASTOLIC BLOOD PRESSURE: 90 MMHG | BODY MASS INDEX: 22.82 KG/M2 | WEIGHT: 124 LBS | HEART RATE: 83 BPM | SYSTOLIC BLOOD PRESSURE: 145 MMHG | HEIGHT: 62 IN | OXYGEN SATURATION: 99 %

## 2021-05-13 DIAGNOSIS — J45.909 ASTHMA, UNSPECIFIED ASTHMA SEVERITY, UNSPECIFIED WHETHER COMPLICATED, UNSPECIFIED WHETHER PERSISTENT: ICD-10-CM

## 2021-05-13 DIAGNOSIS — R05.9 COUGH: ICD-10-CM

## 2021-05-13 DIAGNOSIS — M54.41 ACUTE RIGHT-SIDED LOW BACK PAIN WITH RIGHT-SIDED SCIATICA: Primary | ICD-10-CM

## 2021-05-13 PROCEDURE — G0382 LEV 3 HOSP TYPE B ED VISIT: HCPCS | Performed by: PHYSICIAN ASSISTANT

## 2021-05-13 RX ORDER — CYCLOBENZAPRINE HCL 10 MG
10 TABLET ORAL 3 TIMES DAILY PRN
Qty: 21 TABLET | Refills: 0 | Status: SHIPPED | OUTPATIENT
Start: 2021-05-13 | End: 2022-01-20 | Stop reason: SDUPTHER

## 2021-05-13 RX ORDER — NAPROXEN 500 MG/1
500 TABLET ORAL 2 TIMES DAILY WITH MEALS
Qty: 20 TABLET | Refills: 0 | Status: SHIPPED | OUTPATIENT
Start: 2021-05-18 | End: 2022-01-20 | Stop reason: SDUPTHER

## 2021-05-13 RX ORDER — PREDNISONE 10 MG/1
TABLET ORAL
Qty: 21 TABLET | Refills: 0 | Status: SHIPPED | OUTPATIENT
Start: 2021-05-14 | End: 2022-01-20 | Stop reason: ALTCHOICE

## 2021-05-13 RX ORDER — KETOROLAC TROMETHAMINE 30 MG/ML
30 INJECTION, SOLUTION INTRAMUSCULAR; INTRAVENOUS ONCE
Status: COMPLETED | OUTPATIENT
Start: 2021-05-13 | End: 2021-05-13

## 2021-05-13 RX ORDER — KETOROLAC TROMETHAMINE 30 MG/ML
30 INJECTION, SOLUTION INTRAMUSCULAR; INTRAVENOUS ONCE
Status: DISCONTINUED | OUTPATIENT
Start: 2021-05-13 | End: 2021-05-13

## 2021-05-13 RX ORDER — BENZONATATE 200 MG/1
200 CAPSULE ORAL 3 TIMES DAILY PRN
Qty: 20 CAPSULE | Refills: 0 | Status: SHIPPED | OUTPATIENT
Start: 2021-05-13 | End: 2022-01-20 | Stop reason: ALTCHOICE

## 2021-05-13 RX ADMIN — KETOROLAC TROMETHAMINE 30 MG: 30 INJECTION, SOLUTION INTRAMUSCULAR; INTRAVENOUS at 20:20

## 2021-05-13 NOTE — PATIENT INSTRUCTIONS
May use ice or heat as needed the back     start the prednisone taper tomorrow     start the naproxen on the 18th     go to emergency room for any worsening symptoms     continue your current medications as directed

## 2021-05-14 NOTE — PROGRESS NOTES
[facility]        NAME: Albert Centeno is a 40 y o  female  : 1975    MRN: [id number]  DATE: May 13, 2021  TIME: 9:04 PM    Assessment and Plan   Acute right-sided low back pain with right-sided sciatica [M54 41]  1  Acute right-sided low back pain with right-sided sciatica  predniSONE 10 mg tablet    naproxen (NAPROSYN) 500 mg tablet    cyclobenzaprine (FLEXERIL) 10 mg tablet    ketorolac (TORADOL) injection 30 mg    DISCONTINUED: ketorolac (TORADOL) injection 30 mg   2  Asthma, unspecified asthma severity, unspecified whether complicated, unspecified whether persistent  predniSONE 10 mg tablet   3  Cough  benzonatate (TESSALON) 200 MG capsule         Patient Instructions       Follow up with PCP in 3-5 days  Proceed to  ER if symptoms worsen  Chief Complaint     Chief Complaint   Patient presents with    Back Pain     PATIENT STATES SHE HAVING BACK PAIN ON  L5 SHE HAS A  HERNIA AN IT'S BEEN  FLARE UP AND ASTHAM FLARE -UP AS WELL FOR 3 WEEKS  History of Present Illness         Patient is here for evaluation of a flare-up of her asthma  Patient has been coughing off and on over the past few days  Patient also having a flare-up of her low back pain with sciatica  Patient has been seen for both in the past and has used his inhaler for her asthma  She is taking allergy medications and Mucinex with minimal relief  Patient has been on naproxen and Flexeril for her back in the past   She has gotten shots of Toradol which have helped  Patient has used prednisone in the past for the asthma flare ups as well  Review of Systems   Review of Systems   HENT: Negative  Respiratory: Positive for cough  Negative for shortness of breath and wheezing  Cardiovascular: Negative  Musculoskeletal: Positive for back pain  Negative for gait problem  Skin: Negative  Neurological: Negative            Current Medications       Current Outpatient Medications:     albuterol (5 mg/mL) 0 5 % nebulizer solution, Take 2 5 mg by nebulization every 6 (six) hours as needed for wheezing, Disp: , Rfl:     albuterol (VENTOLIN HFA) 90 mcg/act inhaler, Inhale 2 puffs, Disp: , Rfl:     benzonatate (TESSALON) 200 MG capsule, Take 1 capsule (200 mg total) by mouth 3 (three) times a day as needed for cough, Disp: 20 capsule, Rfl: 0    cyclobenzaprine (FLEXERIL) 10 mg tablet, Take 1 tablet (10 mg total) by mouth 3 (three) times a day as needed for muscle spasms, Disp: 21 tablet, Rfl: 0    docusate sodium (COLACE) 100 mg capsule, Take 1 capsule (100 mg total) by mouth 2 (two) times a day (Patient not taking: Reported on 5/13/2021), Disp: 10 capsule, Rfl: 2    ketorolac (TORADOL) 10 mg tablet, Take 1 tablet (10 mg total) by mouth every 6 (six) hours as needed for moderate pain (Patient not taking: Reported on 10/26/2020), Disp: 20 tablet, Rfl: 1    medroxyPROGESTERone (DEPO-PROVERA) 150 mg/mL injection, Inject 1 mL (150 mg total) into a muscle every 3 (three) months (Patient not taking: Reported on 5/13/2021), Disp: 1 mL, Rfl: 3    [START ON 5/18/2021] naproxen (NAPROSYN) 500 mg tablet, Take 1 tablet (500 mg total) by mouth 2 (two) times a day with meals, Disp: 20 tablet, Rfl: 0    [START ON 5/14/2021] predniSONE 10 mg tablet, Six tablets day 1; 5 tablets day to; 4 tablets day 3; 3 tablets day 4; 2 tablets day 5; and 1 tablet day 6, Disp: 21 tablet, Rfl: 0  No current facility-administered medications for this visit       Current Allergies     Allergies as of 05/13/2021    (No Known Allergies)            The following portions of the patient's history were reviewed and updated as appropriate: allergies, current medications, past family history, past medical history, past social history, past surgical history and problem list      Past Medical History:   Diagnosis Date    Asthma     Back pain     History of thyroid disorder     History of tinea cruris     History of vaginal discharge     Microscopic hematuria     History    Papilloma of tongue     History    Vaginal candidiasis     History       History reviewed  No pertinent surgical history  Family History   Problem Relation Age of Onset    Hypertension Mother     Mental illness Mother         Disorder    Substance Abuse Mother     Breast cancer Mother 27    Thyroid cancer Mother         Metastasis from thyroid cancer    Ovarian cancer Mother 48    Diabetes type I Mother         mellitus with other kidney complication    Diabetes type II Mother         without complication, without long term current use of insulin     Cancer Father     Hypertension Maternal Grandmother     Cancer Paternal Grandfather     Ovarian cancer Maternal Aunt          Medications have been verified  Objective   /90 (BP Location: Right arm, Patient Position: Sitting, Cuff Size: Standard)   Pulse 83   Temp (!) 97 3 °F (36 3 °C) (Temporal)   Resp 20   Ht 5' 2" (1 575 m)   Wt 56 2 kg (124 lb)   LMP 05/06/2021   SpO2 99%   BMI 22 68 kg/m²   Patient's last menstrual period was 05/06/2021  Physical Exam     Physical Exam  Vitals signs and nursing note reviewed  Constitutional:       General: She is not in acute distress  Appearance: Normal appearance  She is well-developed  She is not ill-appearing, toxic-appearing or diaphoretic  HENT:      Head: Normocephalic and atraumatic  Eyes:      Extraocular Movements: Extraocular movements intact  Conjunctiva/sclera: Conjunctivae normal       Pupils: Pupils are equal, round, and reactive to light  Cardiovascular:      Rate and Rhythm: Normal rate and regular rhythm  Heart sounds: Normal heart sounds  Pulmonary:      Effort: Pulmonary effort is normal  No respiratory distress  Breath sounds: Normal breath sounds  No stridor  No wheezing, rhonchi or rales  Musculoskeletal:      Comments: Range of motion of lumbar spine intact but limited flexion to about 45-50 degrees  Extension limited to about 10° with pain  Tenderness of the lumbar paravertebral muscles right greater than left  Deep tender reflexes 2+ equal bilaterally lower extremity  Negative straight leg raise bilaterally to 80° seated  Skin:     General: Skin is warm and dry  Findings: No rash  Neurological:      General: No focal deficit present  Mental Status: She is alert and oriented to person, place, and time  Psychiatric:         Mood and Affect: Mood normal          Behavior: Behavior normal          Thought Content:  Thought content normal          Judgment: Judgment normal

## 2021-05-16 ENCOUNTER — IMMUNIZATIONS (OUTPATIENT)
Dept: FAMILY MEDICINE CLINIC | Facility: HOSPITAL | Age: 46
End: 2021-05-16

## 2021-05-16 DIAGNOSIS — Z23 ENCOUNTER FOR IMMUNIZATION: Primary | ICD-10-CM

## 2021-05-16 PROCEDURE — 91300 SARS-COV-2 / COVID-19 MRNA VACCINE (PFIZER-BIONTECH) 30 MCG: CPT

## 2021-05-16 PROCEDURE — 0002A SARS-COV-2 / COVID-19 MRNA VACCINE (PFIZER-BIONTECH) 30 MCG: CPT

## 2021-05-24 ENCOUNTER — HOSPITAL ENCOUNTER (EMERGENCY)
Facility: HOSPITAL | Age: 46
Discharge: HOME | End: 2021-05-24
Attending: EMERGENCY MEDICINE | Admitting: EMERGENCY MEDICINE

## 2021-05-24 VITALS
RESPIRATION RATE: 20 BRPM | OXYGEN SATURATION: 100 % | HEART RATE: 96 BPM | BODY MASS INDEX: 22.68 KG/M2 | DIASTOLIC BLOOD PRESSURE: 114 MMHG | WEIGHT: 124 LBS | SYSTOLIC BLOOD PRESSURE: 184 MMHG | TEMPERATURE: 97.7 F

## 2021-05-24 DIAGNOSIS — L50.9 URTICARIA: Primary | ICD-10-CM

## 2021-05-24 PROCEDURE — 99284 EMERGENCY DEPT VISIT MOD MDM: CPT | Performed by: EMERGENCY MEDICINE

## 2021-05-24 PROCEDURE — 99283 EMERGENCY DEPT VISIT LOW MDM: CPT

## 2021-05-24 RX ORDER — PREDNISONE 20 MG/1
40 TABLET ORAL DAILY
Qty: 4 TABLET | Refills: 0 | Status: SHIPPED | OUTPATIENT
Start: 2021-05-24 | End: 2021-05-26

## 2021-05-24 RX ORDER — PREDNISONE 20 MG/1
40 TABLET ORAL ONCE
Status: COMPLETED | OUTPATIENT
Start: 2021-05-24 | End: 2021-05-24

## 2021-05-24 RX ORDER — HYDROXYZINE HYDROCHLORIDE 25 MG/1
25 TABLET, FILM COATED ORAL ONCE
Status: COMPLETED | OUTPATIENT
Start: 2021-05-24 | End: 2021-05-24

## 2021-05-24 RX ADMIN — PREDNISONE 40 MG: 20 TABLET ORAL at 21:14

## 2021-05-24 RX ADMIN — HYDROXYZINE HYDROCHLORIDE 25 MG: 25 TABLET ORAL at 21:14

## 2021-05-24 NOTE — Clinical Note
Raymundo Mejia was seen and treated in our emergency department on 5/24/2021  Diagnosis:     Duong Zhong  may return to work on return date  She may return on this date: 05/26/2021         If you have any questions or concerns, please don't hesitate to call        Lorraine Kenny MD    ______________________________           _______________          _______________  Hospital Representative                              Date                                Time

## 2021-05-25 NOTE — ED PROVIDER NOTES
History  Chief Complaint   Patient presents with    Allergic Reaction     Pt reports she has been sob, itchy, and feels "something stuck in her throat " Pt has NKA     HPI  41 yo female with PMH asthma, thyroid disease, presents to the ED with concern for pruritic, raised, erythematous rash on her trunk, arms, and legs since approximately 2200 yesterday evening  She notes an associated "scratchy feeling, like something is stuck in my throat " Denies difficulty swallowing, SOB, nausea, vomiting, chest pain, abdominal pain, fevers, chills  Pt has been taking 50mg PO benadryl Q4H without significant relief  No new detergents, sheets, medications, food, or other known exposures  No prior hx allergic reactions  Son has a different rash, no one at home with similar sxs  Prior to Admission Medications   Prescriptions Last Dose Informant Patient Reported? Taking?    albuterol (5 mg/mL) 0 5 % nebulizer solution  Self Yes No   Sig: Take 2 5 mg by nebulization every 6 (six) hours as needed for wheezing   albuterol (VENTOLIN HFA) 90 mcg/act inhaler   Yes No   Sig: Inhale 2 puffs   benzonatate (TESSALON) 200 MG capsule   No No   Sig: Take 1 capsule (200 mg total) by mouth 3 (three) times a day as needed for cough   cyclobenzaprine (FLEXERIL) 10 mg tablet   No No   Sig: Take 1 tablet (10 mg total) by mouth 3 (three) times a day as needed for muscle spasms   docusate sodium (COLACE) 100 mg capsule   No No   Sig: Take 1 capsule (100 mg total) by mouth 2 (two) times a day   Patient not taking: Reported on 5/13/2021   ketorolac (TORADOL) 10 mg tablet   No No   Sig: Take 1 tablet (10 mg total) by mouth every 6 (six) hours as needed for moderate pain   Patient not taking: Reported on 10/26/2020   medroxyPROGESTERone (DEPO-PROVERA) 150 mg/mL injection   No No   Sig: Inject 1 mL (150 mg total) into a muscle every 3 (three) months   Patient not taking: Reported on 5/13/2021   naproxen (NAPROSYN) 500 mg tablet   No No   Sig: Take 1 tablet (500 mg total) by mouth 2 (two) times a day with meals   predniSONE 10 mg tablet   No No   Sig: Six tablets day 1; 5 tablets day to; 4 tablets day 3; 3 tablets day 4; 2 tablets day 5; and 1 tablet day 6      Facility-Administered Medications: None       Past Medical History:   Diagnosis Date    Asthma     Back pain     History of thyroid disorder     History of tinea cruris     History of vaginal discharge     Microscopic hematuria     History    Papilloma of tongue     History    Vaginal candidiasis     History       History reviewed  No pertinent surgical history  Family History   Problem Relation Age of Onset    Hypertension Mother     Mental illness Mother         Disorder    Substance Abuse Mother     Breast cancer Mother 27    Thyroid cancer Mother         Metastasis from thyroid cancer    Ovarian cancer Mother 48    Diabetes type I Mother         mellitus with other kidney complication    Diabetes type II Mother         without complication, without long term current use of insulin     Cancer Father     Hypertension Maternal Grandmother     Cancer Paternal Grandfather     Ovarian cancer Maternal Aunt      I have reviewed and agree with the history as documented  E-Cigarette/Vaping    E-Cigarette Use Never User      E-Cigarette/Vaping Substances     Social History     Tobacco Use    Smoking status: Never Smoker    Smokeless tobacco: Never Used   Substance Use Topics    Alcohol use: No    Drug use: No        Review of Systems   Constitutional: Negative for chills and fever  HENT: Positive for sore throat  Negative for congestion, rhinorrhea and trouble swallowing  Respiratory: Negative for chest tightness and shortness of breath  Cardiovascular: Negative for chest pain  Gastrointestinal: Negative for abdominal pain, diarrhea, nausea and vomiting  Genitourinary: Negative for dysuria and hematuria  Musculoskeletal: Negative for arthralgias and myalgias     Skin: Positive for rash  Neurological: Negative for dizziness, syncope, weakness, light-headedness, numbness and headaches  All other systems reviewed and are negative  Physical Exam  ED Triage Vitals [05/24/21 1809]   Temperature Pulse Respirations Blood Pressure SpO2   97 7 °F (36 5 °C) 96 20 (!) 184/114 100 %      Temp Source Heart Rate Source Patient Position - Orthostatic VS BP Location FiO2 (%)   Tympanic Monitor Lying Left arm --      Pain Score       --             Orthostatic Vital Signs  Vitals:    05/24/21 1809   BP: (!) 184/114   Pulse: 96   Patient Position - Orthostatic VS: Lying       Physical Exam  Vitals signs and nursing note reviewed  Constitutional:       General: She is in acute distress (appears uncomfortable)  Appearance: She is well-developed  She is not diaphoretic  HENT:      Head: Normocephalic and atraumatic  Right Ear: External ear normal       Left Ear: External ear normal       Nose: Nose normal       Mouth/Throat:      Mouth: Mucous membranes are moist       Pharynx: No posterior oropharyngeal erythema  Eyes:      Conjunctiva/sclera: Conjunctivae normal       Pupils: Pupils are equal, round, and reactive to light  Neck:      Musculoskeletal: Normal range of motion and neck supple  Cardiovascular:      Rate and Rhythm: Normal rate and regular rhythm  Heart sounds: Normal heart sounds  No murmur  No friction rub  No gallop  Pulmonary:      Effort: Pulmonary effort is normal  No respiratory distress  Breath sounds: Normal breath sounds  No wheezing, rhonchi or rales  Abdominal:      General: Bowel sounds are normal  There is no distension  Palpations: Abdomen is soft  There is no mass  Tenderness: There is no abdominal tenderness  There is no guarding or rebound  Musculoskeletal: Normal range of motion  Lymphadenopathy:      Cervical: No cervical adenopathy  Skin:     General: Skin is warm and dry  Findings: Rash present   Rash is urticarial (chest, abdomen, back, arms, legs)  Neurological:      Mental Status: She is alert and oriented to person, place, and time  Cranial Nerves: No cranial nerve deficit  Sensory: No sensory deficit  ED Medications  Medications   predniSONE tablet 40 mg (40 mg Oral Given 5/24/21 2114)   hydrOXYzine HCL (ATARAX) tablet 25 mg (25 mg Oral Given 5/24/21 2114)       Diagnostic Studies  Results Reviewed     None                 No orders to display         Procedures  Procedures      ED Course                                       MDM  Number of Diagnoses or Management Options  Urticaria:     39 yo female with PMH asthma, thyroid disease, presenting with pruritic, erythematous, raised rash  Appears urticarial  Possibly contact dermatitis  Will treat with prednisone burst and discharge with strict return precautions and dermatology follow up  Disposition  Final diagnoses:   Urticaria     Time reflects when diagnosis was documented in both MDM as applicable and the Disposition within this note     Time User Action Codes Description Comment    5/24/2021  9:17 PM Rodrigo Adorno Add [L50 9] Urticaria       ED Disposition     ED Disposition Condition Date/Time Comment    Discharge Stable Mon May 24, 2021  9:17 PM Yassine Pillai discharge to home/self care  Follow-up Information     Follow up With Specialties Details Why Contact Info Additional [address] Dermatology Schedule an appointment as soon as possible for a visit   [phone] [facility], [address] [place] [address], [address] [facility]          Discharge Medication List as of 5/24/2021  9:20 PM      START taking these medications    Details   ! ! predniSONE 20 mg tablet Take 2 tablets (40 mg total) by mouth daily for 2 days, Starting Mon 5/24/2021, Until Wed 5/26/2021, Normal       !! - Potential duplicate medications found  Please discuss with provider  CONTINUE these medications which have NOT CHANGED    Details   albuterol (5 mg/mL) 0 5 % nebulizer solution Take 2 5 mg by nebulization every 6 (six) hours as needed for wheezing, Historical Med      albuterol (VENTOLIN HFA) 90 mcg/act inhaler Inhale 2 puffs, Starting Mon 12/19/2011, Historical Med      benzonatate (TESSALON) 200 MG capsule Take 1 capsule (200 mg total) by mouth 3 (three) times a day as needed for cough, Starting Thu 5/13/2021, Normal      cyclobenzaprine (FLEXERIL) 10 mg tablet Take 1 tablet (10 mg total) by mouth 3 (three) times a day as needed for muscle spasms, Starting Thu 5/13/2021, Normal      docusate sodium (COLACE) 100 mg capsule Take 1 capsule (100 mg total) by mouth 2 (two) times a day, Starting Sun 1/26/2020, Normal      ketorolac (TORADOL) 10 mg tablet Take 1 tablet (10 mg total) by mouth every 6 (six) hours as needed for moderate pain, Starting Sun 1/26/2020, Normal      medroxyPROGESTERone (DEPO-PROVERA) 150 mg/mL injection Inject 1 mL (150 mg total) into a muscle every 3 (three) months, Starting Thu 10/8/2020, Normal      naproxen (NAPROSYN) 500 mg tablet Take 1 tablet (500 mg total) by mouth 2 (two) times a day with meals, Starting Tue 5/18/2021, Normal      !! predniSONE 10 mg tablet Six tablets day 1; 5 tablets day to; 4 tablets day 3; 3 tablets day 4; 2 tablets day 5; and 1 tablet day 6, Normal       !! - Potential duplicate medications found  Please discuss with provider  No discharge procedures on file  PDMP Review     None           ED Provider  Attending physically available and evaluated Callahan Car LEE managed the patient along with the ED Attending      Electronically Signed by         Amanda Robles MD  05/24/21 8249

## 2021-05-25 NOTE — ED ATTENDING ATTESTATION
5/24/2021  IWandy MD, saw and evaluated the patient  I have discussed the patient with the resident/non-physician practitioner and agree with the resident's/non-physician practitioner's findings, Plan of Care, and MDM as documented in the resident's/non-physician practitioner's note, except where noted  All available labs and Radiology studies were reviewed  I was present for key portions of any procedure(s) performed by the resident/non-physician practitioner and I was immediately available to provide assistance  At this point I agree with the current assessment done in the Emergency Department  I have conducted an independent evaluation of this patient a history and physical is as follows:    ED Course         Critical Care Time  Procedures    41 yo female with rash, itchy all over body since last night  Pt taking benadryl with no relief  Pt with no n/v/d, no fever  Pt with scratchiness in throat, no cp, no sob, no abdominal pain  No known exposures  Pt son with rash as well  No new foods, no new meds  Vss, afebrile, lungs cta, rrr, abdomen soft nontender, urticarial rash on trunk    Vistaril, steroid

## 2021-05-25 NOTE — DISCHARGE INSTRUCTIONS
Return to the emergency department for difficult breathing, difficulty swallowing, fever, vomiting, any other new or concerning symptoms

## 2021-05-29 ENCOUNTER — HOSPITAL ENCOUNTER (EMERGENCY)
Facility: HOSPITAL | Age: 46
Discharge: HOME | End: 2021-05-29
Attending: EMERGENCY MEDICINE | Admitting: EMERGENCY MEDICINE

## 2021-05-29 VITALS
SYSTOLIC BLOOD PRESSURE: 129 MMHG | HEART RATE: 96 BPM | OXYGEN SATURATION: 98 % | WEIGHT: 124 LBS | TEMPERATURE: 97.9 F | RESPIRATION RATE: 20 BRPM | BODY MASS INDEX: 22.68 KG/M2 | DIASTOLIC BLOOD PRESSURE: 78 MMHG

## 2021-05-29 DIAGNOSIS — L23.7 POISON IVY DERMATITIS: Primary | ICD-10-CM

## 2021-05-29 PROCEDURE — 99284 EMERGENCY DEPT VISIT MOD MDM: CPT | Performed by: EMERGENCY MEDICINE

## 2021-05-29 PROCEDURE — 99282 EMERGENCY DEPT VISIT SF MDM: CPT

## 2021-05-29 RX ORDER — HYDROXYZINE HYDROCHLORIDE 25 MG/1
25 TABLET, FILM COATED ORAL ONCE
Status: COMPLETED | OUTPATIENT
Start: 2021-05-29 | End: 2021-05-29

## 2021-05-29 RX ORDER — PREDNISONE 20 MG/1
TABLET ORAL
Qty: 27 TABLET | Refills: 0 | Status: SHIPPED | OUTPATIENT
Start: 2021-05-29 | End: 2021-06-12

## 2021-05-29 RX ORDER — HYDROXYZINE HYDROCHLORIDE 25 MG/1
25 TABLET, FILM COATED ORAL EVERY 6 HOURS PRN
Qty: 24 TABLET | Refills: 0 | Status: SHIPPED | OUTPATIENT
Start: 2021-05-29 | End: 2022-01-20 | Stop reason: ALTCHOICE

## 2021-05-29 RX ADMIN — HYDROXYZINE HYDROCHLORIDE 25 MG: 25 TABLET ORAL at 22:04

## 2021-05-30 NOTE — ED PROVIDER NOTES
History  Chief Complaint   Patient presents with   [facility]     pt c/o poison ivy, no relief with prescribed steroids and ointment  72-year-old female presents for evaluation of rash  Patient states on Tuesday she was doing gardening work when she developed a rash that has spread over her body  Rash is itchy for patient, she was seen in this emergency department and diagnosed with urticaria, she was placed on a steroid burst treatment as well as given prescription for Atarax  Patient has taken Benadryl at home and has been using calamine spray without relief  Patient denies new exposures, states she has seasonal allergies  Patient has otherwise been in her usual state of health, denies cold-like symptoms, fevers, chills  Patient eating and drinking her usual self  Prior to Admission Medications   Prescriptions Last Dose Informant Patient Reported? Taking?    albuterol (5 mg/mL) 0 5 % nebulizer solution  Self Yes No   Sig: Take 2 5 mg by nebulization every 6 (six) hours as needed for wheezing   albuterol (VENTOLIN HFA) 90 mcg/act inhaler   Yes No   Sig: Inhale 2 puffs   benzonatate (TESSALON) 200 MG capsule   No No   Sig: Take 1 capsule (200 mg total) by mouth 3 (three) times a day as needed for cough   cyclobenzaprine (FLEXERIL) 10 mg tablet   No No   Sig: Take 1 tablet (10 mg total) by mouth 3 (three) times a day as needed for muscle spasms   docusate sodium (COLACE) 100 mg capsule   No No   Sig: Take 1 capsule (100 mg total) by mouth 2 (two) times a day   Patient not taking: Reported on 5/13/2021   ketorolac (TORADOL) 10 mg tablet   No No   Sig: Take 1 tablet (10 mg total) by mouth every 6 (six) hours as needed for moderate pain   Patient not taking: Reported on 10/26/2020   medroxyPROGESTERone (DEPO-PROVERA) 150 mg/mL injection   No No   Sig: Inject 1 mL (150 mg total) into a muscle every 3 (three) months   Patient not taking: Reported on 5/13/2021   naproxen (NAPROSYN) 500 mg tablet   No No Sig: Take 1 tablet (500 mg total) by mouth 2 (two) times a day with meals   predniSONE 10 mg tablet   No No   Sig: Six tablets day 1; 5 tablets day to; 4 tablets day 3; 3 tablets day 4; 2 tablets day 5; and 1 tablet day 6      Facility-Administered Medications: None       Past Medical History:   Diagnosis Date    Asthma     Back pain     History of thyroid disorder     History of tinea cruris     History of vaginal discharge     Microscopic hematuria     History    Papilloma of tongue     History    Vaginal candidiasis     History       History reviewed  No pertinent surgical history  Family History   Problem Relation Age of Onset    Hypertension Mother     Mental illness Mother         Disorder    Substance Abuse Mother     Breast cancer Mother 27    Thyroid cancer Mother         Metastasis from thyroid cancer    Ovarian cancer Mother 48    Diabetes type I Mother         mellitus with other kidney complication    Diabetes type II Mother         without complication, without long term current use of insulin     Cancer Father     Hypertension Maternal Grandmother     Cancer Paternal Grandfather     Ovarian cancer Maternal Aunt      I have reviewed and agree with the history as documented  E-Cigarette/Vaping    E-Cigarette Use Never User      E-Cigarette/Vaping Substances     Social History     Tobacco Use    Smoking status: Never Smoker    Smokeless tobacco: Never Used   Substance Use Topics    Alcohol use: No    Drug use: No        Review of Systems   Constitutional: Negative for activity change and appetite change  Gastrointestinal: Negative for nausea and vomiting  Skin: Positive for rash  All other systems reviewed and are negative        Physical Exam  ED Triage Vitals [05/29/21 2125]   Temperature Pulse Respirations Blood Pressure SpO2   97 9 °F (36 6 °C) 96 20 129/78 98 %      Temp Source Heart Rate Source Patient Position - Orthostatic VS BP Location FiO2 (%)   Tympanic Monitor Sitting Right arm --      Pain Score       --             Orthostatic Vital Signs  Vitals:    05/29/21 2125   BP: 129/78   Pulse: 96   Patient Position - Orthostatic VS: Sitting       Physical Exam  Vitals signs reviewed  Constitutional:       General: She is not in acute distress  Appearance: Normal appearance  She is normal weight  She is not ill-appearing, toxic-appearing or diaphoretic  HENT:      Head: Normocephalic and atraumatic  Right Ear: External ear normal       Left Ear: External ear normal    Eyes:      General:         Right eye: No discharge  Left eye: No discharge  Extraocular Movements: Extraocular movements intact  Cardiovascular:      Rate and Rhythm: Normal rate  Pulmonary:      Effort: Pulmonary effort is normal  No respiratory distress  Musculoskeletal:         General: No deformity or signs of injury  Skin:     General: Skin is warm  Findings: Rash (diffuse urticarial like rash ) present  Neurological:      General: No focal deficit present  Mental Status: She is alert  Mental status is at baseline  ED Medications  Medications   hydrOXYzine HCL (ATARAX) tablet 25 mg (25 mg Oral Given 5/29/21 2204)       Diagnostic Studies  Results Reviewed     None                 No orders to display         Procedures  Procedures      ED Course                                       MDM  Number of Diagnoses or Management Options  Poison ivy dermatitis:   Diagnosis management comments: 44-year-old female presents for evaluation of rash, she was evaluated in this emergency department previously, at that time is was thought to be an allergic reaction  Patient reports on Tuesday she was gardening, rash is likely contact dermatitis from plants, possibly poison ivy, will treat with steroid taper, Atarax        Disposition  Final diagnoses:   Poison ivy dermatitis     Time reflects when diagnosis was documented in both MDM as applicable and the Disposition within this note     Time User Action Codes Description Comment    5/29/2021  9:47 PM Tremayne Stewart Add [L23 7] Poison ivy dermatitis       ED Disposition     ED Disposition Condition Date/Time Comment    Discharge Stable Sat May 29, 2021  9:47 PM Mary Elam discharge to home/self care  Follow-up Information    None         Discharge Medication List as of 5/29/2021  9:57 PM      START taking these medications    Details   hydrOXYzine HCL (ATARAX) 25 mg tablet Take 1 tablet (25 mg total) by mouth every 6 (six) hours as needed for itching, Starting Sat 5/29/2021, Print      !! predniSONE 20 mg tablet Multiple Dosages:Starting Sat 5/29/2021, Last dose on Wed 6/2/2021, THEN Starting u 6/3/2021, Last dose on Sun 6/6/2021, THEN Starting Mon 6/7/2021, Last dose on Wed 6/9/2021, THEN Starting Thu 6/10/2021, Last dose on Fri 6/11/2021Take 2 5 tablets  (50 mg total) by mouth daily for 5 days, THEN 2 tablets (40 mg total) daily for 4 days, THEN 1 5 tablets (30 mg total) daily for 3 days, THEN 1 tablet (20 mg total) daily for 2 days  , Print       !! - Potential duplicate medications found  Please discuss with provider        CONTINUE these medications which have NOT CHANGED    Details   albuterol (5 mg/mL) 0 5 % nebulizer solution Take 2 5 mg by nebulization every 6 (six) hours as needed for wheezing, Historical Med      albuterol (VENTOLIN HFA) 90 mcg/act inhaler Inhale 2 puffs, Starting Mon 12/19/2011, Historical Med      benzonatate (TESSALON) 200 MG capsule Take 1 capsule (200 mg total) by mouth 3 (three) times a day as needed for cough, Starting Thu 5/13/2021, Normal      cyclobenzaprine (FLEXERIL) 10 mg tablet Take 1 tablet (10 mg total) by mouth 3 (three) times a day as needed for muscle spasms, Starting Thu 5/13/2021, Normal      docusate sodium (COLACE) 100 mg capsule Take 1 capsule (100 mg total) by mouth 2 (two) times a day, Starting Sun 1/26/2020, Normal      ketorolac (TORADOL) 10 mg tablet Take 1 tablet (10 mg total) by mouth every 6 (six) hours as needed for moderate pain, Starting Sun 1/26/2020, Normal      medroxyPROGESTERone (DEPO-PROVERA) 150 mg/mL injection Inject 1 mL (150 mg total) into a muscle every 3 (three) months, Starting u 10/8/2020, Normal      naproxen (NAPROSYN) 500 mg tablet Take 1 tablet (500 mg total) by mouth 2 (two) times a day with meals, Starting Tue 5/18/2021, Normal      !! predniSONE 10 mg tablet Six tablets day 1; 5 tablets day to; 4 tablets day 3; 3 tablets day 4; 2 tablets day 5; and 1 tablet day 6, Normal       !! - Potential duplicate medications found  Please discuss with provider  No discharge procedures on file  PDMP Review     None           ED Provider  Attending physically available and evaluated Shemardean Mannie LEE managed the patient along with the ED Attending      Electronically Signed by         Maria Del Carmen Diallo DO  05/30/21 6507

## 2021-06-01 NOTE — ED ATTENDING ATTESTATION
5/29/2021  IEulalio See, DO, saw and evaluated the patient  I have discussed the patient with the resident/non-physician practitioner and agree with the resident's/non-physician practitioner's findings, Plan of Care, and MDM as documented in the resident's/non-physician practitioner's note, except where noted  All available labs and Radiology studies were reviewed  I was present for key portions of any procedure(s) performed by the resident/non-physician practitioner and I was immediately available to provide assistance  At this point I agree with the current assessment done in the Emergency Department  I have conducted an independent evaluation of this patient a history and physical is as follows:    40 yof with rash   C/w rhus dermatitis, partner with similar, was put on steroids recently, will conitnue for longer duration, dc    ED Course         Critical Care Time  Procedures

## 2021-06-22 NOTE — PATIENT INSTRUCTIONS
Continue to replete low K+  Will increase MgCl2 for borderline hypomagnesemia   Sacroiliitis   WHAT YOU NEED TO KNOW:   Sacroiliitis is a painful swelling of one or both of your sacroiliac joints that lasts at least 3 months  The sacroiliac joint connects your pelvis to the base of your spine  DISCHARGE INSTRUCTIONS:   Medicines:  Ask for more information about these and other medicines you may need to treat sacroiliitis:  · Pain medicine: You may be given medicine to take away or decrease pain  Do not wait until the pain is severe before you take your medicine  You may be given the medicine as a pill to swallow or as a lotion that you put on the painful area  · NSAIDs  help decrease swelling and pain or fever  This medicine is available with or without a doctor's order  NSAIDs can cause stomach bleeding or kidney problems in certain people  If you take blood thinner medicine, always ask your healthcare provider if NSAIDs are safe for you  Always read the medicine label and follow directions  · Muscle relaxers  help decrease pain and muscle spasms  · Take your medicine as directed  Contact your healthcare provider if you think your medicine is not helping or if you have side effects  Tell him of her if you are allergic to any medicine  Keep a list of the medicines, vitamins, and herbs you take  Include the amounts, and when and why you take them  Bring the list or the pill bottles to follow-up visits  Carry your medicine list with you in case of an emergency  Physical therapy:  Your healthcare provider may suggest physical therapy  Your physical therapist may teach you exercises to improve posture (the way you stand and sit), flexibility, and strength in your lower back  He may also teach you how to remain safely active and avoid further injury  Follow the exercise plan given to you by your physical therapist   Rest:  Follow your healthcare provider's instructions about how much rest you should get  Avoid activity that worsens your pain    Ice or heat packs:  Use ice or heat packs on the sore area of your body to decrease the pain and swelling  Put ice in a plastic bag covered with a towel on your low back  Cover heated items with a towel to avoid burns  Use ice and heat as directed  Follow up with your healthcare provider or spine specialist within 1 to 2 weeks:  Write down your questions so you remember to ask them during your visits  Contact your healthcare provider or spine specialist if:   · Your pain makes it hard for you to do your daily activities, such as work or school  · Your pain does not go away after treatment  · You feel depressed or anxious  · You have questions about your condition or care  Return to the emergency department if:   · You have a fever  · Your pain is worse than before  · Your pain prevents you from sleeping  © 2017 [employer] Information is for End User's use only and may not be sold, redistributed or otherwise used for commercial purposes  All illustrations and images included in CareNotes® are the copyrighted property of A D [employer] M [employer]  or Jeison Toussaint  The above information is an  only  It is not intended as medical advice for individual conditions or treatments  Talk to your doctor, nurse or pharmacist before following any medical regimen to see if it is safe and effective for you

## 2021-09-15 ENCOUNTER — OFFICE VISIT (OUTPATIENT)
Dept: URGENT CARE | Age: 46
End: 2021-09-15
Payer: COMMERCIAL

## 2021-09-15 VITALS — OXYGEN SATURATION: 97 % | TEMPERATURE: 98 F | HEART RATE: 82 BPM | RESPIRATION RATE: 20 BRPM

## 2021-09-15 DIAGNOSIS — J02.9 SORE THROAT: ICD-10-CM

## 2021-09-15 DIAGNOSIS — Z20.822 EXPOSURE TO COVID-19 VIRUS: ICD-10-CM

## 2021-09-15 DIAGNOSIS — B34.9 VIRAL SYNDROME: Primary | ICD-10-CM

## 2021-09-15 LAB — S PYO AG THROAT QL: NEGATIVE

## 2021-09-15 PROCEDURE — U0005 INFEC AGEN DETEC AMPLI PROBE: HCPCS | Performed by: PHYSICIAN ASSISTANT

## 2021-09-15 PROCEDURE — 87070 CULTURE OTHR SPECIMN AEROBIC: CPT | Performed by: PHYSICIAN ASSISTANT

## 2021-09-15 PROCEDURE — 87880 STREP A ASSAY W/OPTIC: CPT | Performed by: PHYSICIAN ASSISTANT

## 2021-09-15 PROCEDURE — U0003 INFECTIOUS AGENT DETECTION BY NUCLEIC ACID (DNA OR RNA); SEVERE ACUTE RESPIRATORY SYNDROME CORONAVIRUS 2 (SARS-COV-2) (CORONAVIRUS DISEASE [COVID-19]), AMPLIFIED PROBE TECHNIQUE, MAKING USE OF HIGH THROUGHPUT TECHNOLOGIES AS DESCRIBED BY CMS-2020-01-R: HCPCS | Performed by: PHYSICIAN ASSISTANT

## 2021-09-15 PROCEDURE — 99213 OFFICE O/P EST LOW 20 MIN: CPT | Performed by: PHYSICIAN ASSISTANT

## 2021-09-16 NOTE — PATIENT INSTRUCTIONS
Rapid strep negative, throat culture pending  COVID swab collected today, test results pending  Check MyChart and call in 2-3 days for test results  Results may take up to 7-10 days  Quarantine guidelines discussed  OTC supplements/medications discussed  Follow-up with PCP in the next 1-2 days for re-evaluation  Go to the ED if any fevers, unable to stay hydrated, abdominal pain, chest pain, shortness of breath, wheezing, chest tightness, cough or cold symptoms, new or worsening symptoms or other concerning symptoms  [address]    Your healthcare provider and/or public health staff have evaluated you and have determined that you do not need to remain in the hospital at this time  At this time you can be isolated at home where you will be monitored by staff from your local or state health department  You should carefully follow the prevention and isolation steps below until a healthcare provider or local or state health department says that you can return to your normal activities  Stay home except to get medical care    People who are mildly ill with COVID-19 are able to isolate at home during their illness  You should restrict activities outside your home, except for getting medical care  Do not go to work, school, or public areas  Avoid using public transportation, ride-sharing, or taxis  Separate yourself from other people and animals in your home    People: As much as possible, you should stay in a specific room and away from other people in your home  Also, you should use a separate bathroom, if available  Animals: You should restrict contact with pets and other animals while you are sick with COVID-19, just like you would around other people  Although there have not been reports of pets or other animals becoming sick with COVID-19, it is still recommended that people sick with COVID-19 limit contact with animals until more information is known about the virus   When possible, have another member of your household care for your animals while you are sick  If you are sick with COVID-19, avoid contact with your pet, including petting, snuggling, being kissed or licked, and sharing food  If you must care for your pet or be around animals while you are sick, wash your hands before and after you interact with pets and wear a facemask  See COVID-19 and Animals for more information  Call ahead before visiting your doctor    If you have a medical appointment, call the healthcare provider and tell them that you have or may have COVID-19  This will help the healthcare providers office take steps to keep other people from getting infected or exposed  Wear a facemask    You should wear a facemask when you are around other people (e g , sharing a room or vehicle) or pets and before you enter a healthcare providers office  If you are not able to wear a facemask (for example, because it causes trouble breathing), then people who live with you should not stay in the same room with you, or they should wear a facemask if they enter your room  Cover your coughs and sneezes    Cover your mouth and nose with a tissue when you cough or sneeze  Throw used tissues in a lined trash can  Immediately wash your hands with soap and water for at least 20 seconds or, if soap and water are not available, clean your hands with an alcohol-based hand  that contains at least 60% alcohol  Clean your hands often    Wash your hands often with soap and water for at least 20 seconds, especially after blowing your nose, coughing, or sneezing; going to the bathroom; and before eating or preparing food  If soap and water are not readily available, use an alcohol-based hand  with at least 60% alcohol, covering all surfaces of your hands and rubbing them together until they feel dry  Soap and water are the best option if hands are visibly dirty   Avoid touching your eyes, nose, and mouth with unwashed hands  Avoid sharing personal household items    You should not share dishes, drinking glasses, cups, eating utensils, towels, or bedding with other people or pets in your home  After using these items, they should be washed thoroughly with soap and water  Clean all high-touch surfaces everyday    High touch surfaces include counters, tabletops, doorknobs, bathroom fixtures, toilets, phones, keyboards, tablets, and bedside tables  Also, clean any surfaces that may have blood, stool, or body fluids on them  Use a household cleaning spray or wipe, according to the label instructions  Labels contain instructions for safe and effective use of the cleaning product including precautions you should take when applying the product, such as wearing gloves and making sure you have good ventilation during use of the product  Monitor your symptoms    Seek prompt medical attention if your illness is worsening (e g , difficulty breathing)  Before seeking care, call your healthcare provider and tell them that you have, or are being evaluated for, COVID-19  Put on a facemask before you enter the facility  These steps will help the healthcare providers office to keep other people in the office or waiting room from getting infected or exposed  Ask your healthcare provider to call the local or [facility] health department  Persons who are placed under active monitoring or facilitated self-monitoring should follow instructions provided by their local health department or occupational health professionals, as appropriate  If you have a medical emergency and need to call 911, notify the dispatch personnel that you have, or are being evaluated for COVID-19  If possible, put on a facemask before emergency medical services arrive      Discontinuing home isolation    Patients with confirmed COVID-19 should remain under home isolation precautions until the following conditions are met:   - They have had no fever for at least 24 hours (that is one full day of no fever without the use medicine that reduces fevers)  AND  - other symptoms have improved (for example, when their cough or shortness of breath have improved)  AND  - If had mild or moderate illness, at least 10 days have passed since their symptoms first appeared or if severe illness (needed oxygen) or immunosuppressed, at least 20 days have passed since symptoms first appeared  Patients with confirmed COVID-19 should also notify close contacts (including their workplace) and ask that they self-quarantine  Currently, close contact is defined as being within 6 feet for 15 minutes or more from the period 24 hours starting 48 hours before symptom onset to the time at which the patient went into isolation  Close contacts of patients diagnosed with COVID-19 should be instructed by the patient to self-quarantine for 14 days from the last time of their last contact with the patient       Source: [employer]

## 2021-09-16 NOTE — PROGRESS NOTES
[address]        NAME: Abran Main is a 40 y o  female  : 1975    MRN: [id number]  DATE: September 15, 2021  TIME: 10:46 PM    Assessment and Plan   Viral syndrome [B34 9]  1  Viral syndrome  POCT rapid strepA    Novel Coronavirus (COVID-19), PCR [facility]    Throat culture   2  Sore throat     3  Exposure to COVID-19 virus         Rapid strep negative, throat culture pending  COVID test pending    Patient Instructions      Rapid strep negative, throat culture pending  COVID swab collected today, test results pending  Check MyChart and call in 2-3 days for test results  Results may take up to 7-10 days  Quarantine guidelines discussed  OTC supplements/medications discussed  Follow-up with PCP in the next 1-2 days for re-evaluation  Go to the ED if any fevers, unable to stay hydrated, abdominal pain, chest pain, shortness of breath, wheezing, chest tightness, cough or cold symptoms, new or worsening symptoms or other concerning symptoms  Chief Complaint     Chief Complaint   Patient presents with    Cough     pt states started with symptoms 2 days ago, son is covid positive, pt is vaccinated    Sore Throat    Fatigue    Nasal Congestion         History of Present Illness        52-year-old female presents for COVID testing  States she has had a mild intermittent dry cough, sore throat, nasal congestion/runny nose, mild generalized fatigue/ tiredness, and loss of taste times 2-3 days  She denies any fevers  States her son tested positive for COVID/had direct exposure  States he has been taking Tylenol and over-the-counter cough/ cold medication with some relief  She denies any chest pain, chest tightness, shortness of breath, GI/  symptoms or other complaints  Review of Systems   Review of Systems   Constitutional: Positive for fatigue  Negative for activity change, appetite change, chills and fever     HENT: Positive for congestion, rhinorrhea and sore throat  Negative for ear pain, facial swelling, postnasal drip, sinus pressure, trouble swallowing and voice change  Eyes: Negative for discharge, itching and visual disturbance  Respiratory: Positive for cough  Negative for chest tightness, shortness of breath and wheezing  Cardiovascular: Negative for chest pain  Gastrointestinal: Negative for abdominal pain, diarrhea, nausea and vomiting  Musculoskeletal: Negative for back pain and neck pain  Skin: Negative for rash  Neurological: Negative for dizziness, syncope, weakness, numbness and headaches  All other systems reviewed and are negative          Current Medications       Current Outpatient Medications:     albuterol (5 mg/mL) 0 5 % nebulizer solution, Take 2 5 mg by nebulization every 6 (six) hours as needed for wheezing (Patient not taking: Reported on 9/15/2021), Disp: , Rfl:     albuterol (VENTOLIN HFA) 90 mcg/act inhaler, Inhale 2 puffs (Patient not taking: Reported on 9/15/2021), Disp: , Rfl:     benzonatate (TESSALON) 200 MG capsule, Take 1 capsule (200 mg total) by mouth 3 (three) times a day as needed for cough (Patient not taking: Reported on 9/15/2021), Disp: 20 capsule, Rfl: 0    cyclobenzaprine (FLEXERIL) 10 mg tablet, Take 1 tablet (10 mg total) by mouth 3 (three) times a day as needed for muscle spasms (Patient not taking: Reported on 9/15/2021), Disp: 21 tablet, Rfl: 0    docusate sodium (COLACE) 100 mg capsule, Take 1 capsule (100 mg total) by mouth 2 (two) times a day (Patient not taking: Reported on 5/13/2021), Disp: 10 capsule, Rfl: 2    hydrOXYzine HCL (ATARAX) 25 mg tablet, Take 1 tablet (25 mg total) by mouth every 6 (six) hours as needed for itching (Patient not taking: Reported on 9/15/2021), Disp: 24 tablet, Rfl: 0    ketorolac (TORADOL) 10 mg tablet, Take 1 tablet (10 mg total) by mouth every 6 (six) hours as needed for moderate pain (Patient not taking: Reported on 10/26/2020), Disp: 20 tablet, Rfl: 1    medroxyPROGESTERone (DEPO-PROVERA) 150 mg/mL injection, Inject 1 mL (150 mg total) into a muscle every 3 (three) months (Patient not taking: Reported on 5/13/2021), Disp: 1 mL, Rfl: 3    naproxen (NAPROSYN) 500 mg tablet, Take 1 tablet (500 mg total) by mouth 2 (two) times a day with meals (Patient not taking: Reported on 9/15/2021), Disp: 20 tablet, Rfl: 0    predniSONE 10 mg tablet, Six tablets day 1; 5 tablets day to; 4 tablets day 3; 3 tablets day 4; 2 tablets day 5; and 1 tablet day 6 (Patient not taking: Reported on 9/15/2021), Disp: 21 tablet, Rfl: 0    Current Allergies     Allergies as of 09/15/2021    (No Known Allergies)            The following portions of the patient's history were reviewed and updated as appropriate: allergies, current medications, past family history, past medical history, past social history, past surgical history and problem list      Past Medical History:   Diagnosis Date    Asthma     Back pain     History of thyroid disorder     History of tinea cruris     History of vaginal discharge     Microscopic hematuria     History    Papilloma of tongue     History    Vaginal candidiasis     History       History reviewed  No pertinent surgical history  Family History   Problem Relation Age of Onset    Hypertension Mother     Mental illness Mother         Disorder    Substance Abuse Mother     Breast cancer Mother 27    Thyroid cancer Mother         Metastasis from thyroid cancer    Ovarian cancer Mother 48    Diabetes type I Mother         mellitus with other kidney complication    Diabetes type II Mother         without complication, without long term current use of insulin     Cancer Father     Hypertension Maternal Grandmother     Cancer Paternal Grandfather     Ovarian cancer Maternal Aunt          Medications have been verified          Objective   Pulse 82   Temp 98 °F (36 7 °C)   Resp 20   SpO2 97%        Physical Exam     Physical Exam  Vitals and nursing note reviewed  Constitutional:       General: She is not in acute distress  Appearance: She is well-developed  HENT:      Head: Normocephalic and atraumatic  Right Ear: Tympanic membrane normal       Left Ear: Tympanic membrane normal       Mouth/Throat:      Mouth: Mucous membranes are moist       Pharynx: Oropharynx is clear  Uvula midline  Posterior oropharyngeal erythema present  No uvula swelling  Tonsils: No tonsillar exudate  0 on the right  0 on the left  Eyes:      Pupils: Pupils are equal, round, and reactive to light  Cardiovascular:      Rate and Rhythm: Normal rate and regular rhythm  Heart sounds: Normal heart sounds  Pulmonary:      Effort: Pulmonary effort is normal  No respiratory distress  Breath sounds: Normal breath sounds  No wheezing  Musculoskeletal:      Cervical back: Normal range of motion and neck supple  Skin:     Capillary Refill: Capillary refill takes less than 2 seconds  Neurological:      Mental Status: She is alert and oriented to person, place, and time     Psychiatric:         Behavior: Behavior normal

## 2021-09-19 LAB — BACTERIA THROAT CULT: NORMAL

## 2022-01-02 ENCOUNTER — OFFICE VISIT (OUTPATIENT)
Dept: URGENT CARE | Age: 47
End: 2022-01-02
Payer: COMMERCIAL

## 2022-01-02 VITALS — HEART RATE: 92 BPM | RESPIRATION RATE: 20 BRPM | TEMPERATURE: 97.5 F | OXYGEN SATURATION: 99 %

## 2022-01-02 DIAGNOSIS — R68.89 FLU-LIKE SYMPTOMS: Primary | ICD-10-CM

## 2022-01-02 PROCEDURE — 99213 OFFICE O/P EST LOW 20 MIN: CPT | Performed by: NURSE PRACTITIONER

## 2022-01-02 PROCEDURE — 87636 SARSCOV2 & INF A&B AMP PRB: CPT | Performed by: NURSE PRACTITIONER

## 2022-01-03 NOTE — PROGRESS NOTES
[address] [facility]        NAME: Raymundo Mejia is a 40 y o  female  : 1975    MRN: [id number]  DATE: 2022  TIME: 8:09 PM    Assessment and Plan   Flu-like symptoms [R68 89]  1  Flu-like symptoms  COVID/FLU- Office Collect         Patient Instructions     Covid/flu tested; results in 1-2 days  Stay quarantined   Follow up with PCP in 3-5 days  Proceed to  ER if symptoms worsen  Chief Complaint     Chief Complaint   Patient presents with    Chills     pt states started with symptoms 3-4 days ago, was in [place] , unknown exposure  Pt is vaccinated    Generalized Body Aches    Headache    Nasal Congestion         History of Present Illness       HPI   Reports chills, bodyaches, HA, and nasal congestion  Traveled to [place]  No known exposure to sick persons  Requesting testing for covid    Review of Systems   Review of Systems   Constitutional: Positive for chills  Negative for fever  HENT: Positive for congestion and rhinorrhea  Negative for sore throat  Respiratory: Negative for cough, chest tightness, shortness of breath and wheezing  Cardiovascular: Negative for chest pain  Gastrointestinal: Negative for diarrhea and vomiting  Musculoskeletal: Positive for myalgias  Neurological: Positive for headaches           Current Medications       Current Outpatient Medications:     albuterol (5 mg/mL) 0 5 % nebulizer solution, Take 2 5 mg by nebulization every 6 (six) hours as needed for wheezing (Patient not taking: Reported on 9/15/2021), Disp: , Rfl:     albuterol (VENTOLIN HFA) 90 mcg/act inhaler, Inhale 2 puffs (Patient not taking: Reported on 9/15/2021), Disp: , Rfl:     benzonatate (TESSALON) 200 MG capsule, Take 1 capsule (200 mg total) by mouth 3 (three) times a day as needed for cough (Patient not taking: Reported on 9/15/2021), Disp: 20 capsule, Rfl: 0    cyclobenzaprine (FLEXERIL) 10 mg tablet, Take 1 tablet (10 mg total) by mouth 3 (three) times a day as needed for muscle spasms (Patient not taking: Reported on 9/15/2021), Disp: 21 tablet, Rfl: 0    docusate sodium (COLACE) 100 mg capsule, Take 1 capsule (100 mg total) by mouth 2 (two) times a day (Patient not taking: Reported on 5/13/2021), Disp: 10 capsule, Rfl: 2    hydrOXYzine HCL (ATARAX) 25 mg tablet, Take 1 tablet (25 mg total) by mouth every 6 (six) hours as needed for itching (Patient not taking: Reported on 9/15/2021), Disp: 24 tablet, Rfl: 0    ketorolac (TORADOL) 10 mg tablet, Take 1 tablet (10 mg total) by mouth every 6 (six) hours as needed for moderate pain (Patient not taking: Reported on 10/26/2020), Disp: 20 tablet, Rfl: 1    medroxyPROGESTERone (DEPO-PROVERA) 150 mg/mL injection, Inject 1 mL (150 mg total) into a muscle every 3 (three) months (Patient not taking: Reported on 5/13/2021), Disp: 1 mL, Rfl: 3    naproxen (NAPROSYN) 500 mg tablet, Take 1 tablet (500 mg total) by mouth 2 (two) times a day with meals (Patient not taking: Reported on 9/15/2021), Disp: 20 tablet, Rfl: 0    predniSONE 10 mg tablet, Six tablets day 1; 5 tablets day to; 4 tablets day 3; 3 tablets day 4; 2 tablets day 5; and 1 tablet day 6 (Patient not taking: Reported on 9/15/2021), Disp: 21 tablet, Rfl: 0    Current Allergies     Allergies as of 01/02/2022    (No Known Allergies)            The following portions of the patient's history were reviewed and updated as appropriate: allergies, current medications, past family history, past medical history, past social history, past surgical history and problem list      Past Medical History:   Diagnosis Date    Asthma     Back pain     History of thyroid disorder     History of tinea cruris     History of vaginal discharge     Microscopic hematuria     History    Papilloma of tongue     History    Vaginal candidiasis     History       History reviewed  No pertinent surgical history      Family History   Problem Relation Age of Onset    Hypertension Mother     Mental illness Mother         Disorder    Substance Abuse Mother     Breast cancer Mother 27    Thyroid cancer Mother         Metastasis from thyroid cancer    Ovarian cancer Mother 48    Diabetes type I Mother         mellitus with other kidney complication    Diabetes type II Mother         without complication, without long term current use of insulin     Cancer Father     Hypertension Maternal Grandmother     Cancer Paternal Grandfather     Ovarian cancer Maternal Aunt          Medications have been verified  Objective   Pulse 92   Temp 97 5 °F (36 4 °C)   Resp 20   SpO2 99%   No LMP recorded  Patient is perimenopausal        Physical Exam     Physical Exam  Constitutional:       Appearance: She is not ill-appearing or diaphoretic  HENT:      Right Ear: Tympanic membrane and ear canal normal       Left Ear: Tympanic membrane and ear canal normal       Nose: Rhinorrhea present  Mouth/Throat:      Mouth: Mucous membranes are moist       Pharynx: No posterior oropharyngeal erythema  Cardiovascular:      Rate and Rhythm: Regular rhythm  Heart sounds: Normal heart sounds  Pulmonary:      Effort: Pulmonary effort is normal       Breath sounds: Normal breath sounds  No wheezing

## 2022-01-07 LAB
FLUAV RNA RESP QL NAA+PROBE: NEGATIVE
FLUBV RNA RESP QL NAA+PROBE: NEGATIVE
SARS-COV-2 RNA RESP QL NAA+PROBE: POSITIVE

## 2022-01-20 ENCOUNTER — OFFICE VISIT (OUTPATIENT)
Dept: FAMILY MEDICINE CLINIC | Facility: CLINIC | Age: 47
End: 2022-01-20
Payer: COMMERCIAL

## 2022-01-20 VITALS
TEMPERATURE: 97.2 F | BODY MASS INDEX: 23.23 KG/M2 | DIASTOLIC BLOOD PRESSURE: 80 MMHG | OXYGEN SATURATION: 99 % | WEIGHT: 127 LBS | SYSTOLIC BLOOD PRESSURE: 120 MMHG | HEART RATE: 80 BPM

## 2022-01-20 DIAGNOSIS — E55.9 VITAMIN D DEFICIENCY: ICD-10-CM

## 2022-01-20 DIAGNOSIS — M54.41 ACUTE RIGHT-SIDED LOW BACK PAIN WITH RIGHT-SIDED SCIATICA: ICD-10-CM

## 2022-01-20 DIAGNOSIS — Z11.4 SCREENING FOR HIV WITHOUT PRESENCE OF RISK FACTORS: ICD-10-CM

## 2022-01-20 DIAGNOSIS — M54.16 LUMBAR RADICULOPATHY: ICD-10-CM

## 2022-01-20 DIAGNOSIS — Z11.59 ENCOUNTER FOR HEPATITIS C SCREENING TEST FOR LOW RISK PATIENT: ICD-10-CM

## 2022-01-20 DIAGNOSIS — U07.1 COVID-19: ICD-10-CM

## 2022-01-20 DIAGNOSIS — Z13.220 LIPID SCREENING: ICD-10-CM

## 2022-01-20 DIAGNOSIS — R53.82 CHRONIC FATIGUE: Primary | ICD-10-CM

## 2022-01-20 DIAGNOSIS — Z13.1 DIABETES MELLITUS SCREENING: ICD-10-CM

## 2022-01-20 PROBLEM — H61.23 BILATERAL IMPACTED CERUMEN: Status: RESOLVED | Noted: 2019-03-24 | Resolved: 2022-01-20

## 2022-01-20 PROCEDURE — 99214 OFFICE O/P EST MOD 30 MIN: CPT | Performed by: FAMILY MEDICINE

## 2022-01-20 RX ORDER — CYCLOBENZAPRINE HCL 10 MG
10 TABLET ORAL 3 TIMES DAILY PRN
Qty: 21 TABLET | Refills: 0 | Status: SHIPPED | OUTPATIENT
Start: 2022-01-20 | End: 2022-06-21 | Stop reason: SDUPTHER

## 2022-01-20 RX ORDER — NAPROXEN 500 MG/1
500 TABLET ORAL 2 TIMES DAILY PRN
Qty: 20 TABLET | Refills: 0 | Status: SHIPPED | OUTPATIENT
Start: 2022-01-20 | End: 2022-02-21 | Stop reason: SDUPTHER

## 2022-01-20 NOTE — ASSESSMENT & PLAN NOTE
Continues to have pain, with worsening over time  Recommended continued exercises, along with trial of OMT  Refilled short supply of cyclobenzaprine, may use NSAIDs as needed

## 2022-01-20 NOTE — PROGRESS NOTES
Family Medicine Follow-Up Office Visit  Onur Marte 40 y o  female   MRN: [id number] : 1975  ENCOUNTER: 2022 9:12 AM    Assessment and Plan   Fatigue  Unclear etiology, though likely multifactorial   Will check CBC, TSH, Vit D (previously deficient)  Encouraged continued physical activity and a well-balanced approach to nutrition  Lumbar radiculopathy  Continues to have pain, with worsening over time  Recommended continued exercises, along with trial of OMT  Refilled short supply of cyclobenzaprine, may use NSAIDs as needed  Leg pain may represent stenotic claudication  Will await response to medication and OMT  COVID-19  Doing well 2 weeks post-infection  Health Maintenance  Will get COVID booster in approx 30d  Lipid, DM, HIV, Hep C screening ordered  GYN visit and Mammogram upcoming  RTC in 4wks for lab review with me, and ASAP for OMT evaluation    Chief Complaint     Chief Complaint   Patient presents with    Follow-up       History of Present Illness   Onur Marte is a 40y o -year-old female who presents today for followup of COVID-19 diagnosed 22  She came through [facility] without severe symptoms and has been doing well  Even before COVID, was feeling much more fatigued than she had previously  No change in activity or mood - she doesn't have a great idea of why she's been feeling so tired  Still with significant R>L low back pain - tries to avoid medication (doing some exercise), has felt progressive worsening over time  Has not used muscle relaxant in quite some time due to not having it  Also noting some pain/fatigue in her lower legs (bilaterally) for quite some time, worse while walking  Due for Pap and GYN exam with her GYN team     Review of Systems   Review of Systems   Constitutional: Positive for fatigue  Negative for activity change, chills and fever  HENT: Negative for congestion, sinus pressure, sinus pain and sore throat      Respiratory: Negative for cough, shortness of breath and wheezing  Cardiovascular: Negative for chest pain, palpitations and leg swelling  Gastrointestinal: Negative for abdominal pain, diarrhea, nausea and vomiting  Genitourinary: Negative for decreased urine volume, dysuria, frequency and urgency  Musculoskeletal: Positive for back pain  Negative for arthralgias, myalgias, neck pain and neck stiffness  Lower leg pain   Skin: Negative for rash  Neurological: Negative for dizziness, light-headedness, numbness and headaches  Active Problem List     Patient Active Problem List   Diagnosis    Bulging lumbar disc    Cough variant asthma    Dense breast tissue    Fatigue    Lumbar disc herniation    Lumbar radiculopathy    Moderate persistent asthma    Microscopic hematuria    Peripheral neuropathy    Thyroid nodule    Vitamin B12 deficiency    Vitamin D deficiency    Cyst of left ovary    Abdominal pain in female    COVID-19       Past Medical History, Past Surgical History, Family History, and Social History were reviewed and updated today as appropriate  Objective   /80   Pulse 80   Temp (!) 97 2 °F (36 2 °C)   Wt 57 6 kg (127 lb)   SpO2 99%   BMI 23 23 kg/m²     Physical Exam  Constitutional:       General: She is not in acute distress  Appearance: She is well-developed  HENT:      Head: Normocephalic and atraumatic  Eyes:      Pupils: Pupils are equal, round, and reactive to light  Cardiovascular:      Rate and Rhythm: Normal rate and regular rhythm  Heart sounds: Normal heart sounds  No murmur heard  No friction rub  No gallop  Pulmonary:      Effort: Pulmonary effort is normal  No respiratory distress  Breath sounds: Normal breath sounds  No wheezing or rales  Abdominal:      General: There is no distension  Palpations: Abdomen is soft  Musculoskeletal:         General: Normal range of motion        Cervical back: Normal range of motion and neck supple  Comments: Low back discomfort with SLR, no radicular symptoms  Neurological:      Mental Status: She is alert and oriented to person, place, and time  Psychiatric:         Behavior: Behavior normal          Thought Content:  Thought content normal        Diabetic Foot Exam    Pertinent Laboratory/Diagnostic Studies:  Lab Results   Component Value Date    GLUCOSE 111 06/03/2017    BUN 17 07/15/2020    CREATININE 0 63 07/15/2020    CALCIUM 8 4 07/15/2020     01/17/2015    K 3 7 07/15/2020    CO2 28 07/15/2020     07/15/2020     Lab Results   Component Value Date    ALT 29 01/25/2020    AST 15 01/25/2020    ALKPHOS 105 01/25/2020    BILITOT 0 3 01/17/2015       Lab Results   Component Value Date    WBC 6 10 07/15/2020    HGB 12 5 07/15/2020    HCT 37 5 07/15/2020    MCV 91 07/15/2020     07/15/2020       No results found for: TSH    No results found for: CHOL  Lab Results   Component Value Date    TRIG 64 03/19/2016     Lab Results   Component Value Date    HDL 53 03/19/2016     Lab Results   Component Value Date    LDLCALC 78 03/19/2016     No results found for: HGBA1C    Results for orders placed or performed in visit on 01/02/22   COVID/FLU- Office Collect    Specimen: Nose; Nares   Result Value Ref Range    SARS-CoV-2 Positive (A) Negative    INFLUENZA A PCR Negative Negative    INFLUENZA B PCR Negative Negative       Orders Placed This Encounter   Procedures    Hepatitis C antibody    HIV 1/2 Antigen/Antibody (4th Generation) w Reflex [facility]    TSH, 3rd generation with Free T4 reflex    CBC and differential    Comprehensive metabolic panel    NYASIA Screen w/ Reflex to Titer/Pattern    Lipid Panel with Direct LDL reflex    Vitamin D 25 hydroxy         Current Medications     Current Outpatient Medications   Medication Sig Dispense Refill    cyclobenzaprine (FLEXERIL) 10 mg tablet Take 1 tablet (10 mg total) by mouth 3 (three) times a day as needed for muscle spasms 21 tablet 0    naproxen (NAPROSYN) 500 mg tablet Take 1 tablet (500 mg total) by mouth 2 (two) times a day as needed for mild pain 20 tablet 0     No current facility-administered medications for this visit  ALLERGIES:  No Known Allergies    Health Maintenance     Health Maintenance   Topic Date Due    Hepatitis C Screening  Never done    Pneumococcal Vaccine: Pediatrics (0 to 5 Years) and At-Risk Patients (6 to 59 Years) (1 of 2 - PPSV23) Never done    HIV Screening  Never done    Breast Cancer Screening: Mammogram  01/29/2020    Influenza Vaccine (1) 09/01/2021    Annual Physical  10/08/2021    COVID-19 Vaccine (3 - Booster for Pfizer series) 11/16/2021    Depression Screening  05/13/2022    BMI: Adult  01/20/2023    Cervical Cancer Screening  10/08/2023    DTaP,Tdap,and Td Vaccines (2 - Td or Tdap) 03/01/2026    HIB Vaccine  Aged Out    Hepatitis B Vaccine  Aged Out    IPV Vaccine  Aged Out    Hepatitis A Vaccine  Aged Out    Meningococcal ACWY Vaccine  Aged Out    HPV Vaccine  Aged Out     Immunization History   Administered Date(s) Administered    COVID-19 PFIZER VACCINE 0 3 ML IM 04/16/2021, 05/16/2021    Influenza Quadrivalent Preservative Free 3 years and older IM 03/01/2016    Tdap 03/01/2016         Brian Torres MD   [address]  1/20/2022  9:12 AM    Parts of this note were dictated using [employer] dictation software and may have sounds-like errors due to variation in pronunciation

## 2022-01-20 NOTE — ASSESSMENT & PLAN NOTE
Unclear etiology, though likely multifactorial   Will check CBC, TSH, Vit D (previously deficient)  Encouraged continued physical activity and a well-balanced approach to nutrition
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
## 2022-02-08 ENCOUNTER — PROCEDURE VISIT (OUTPATIENT)
Dept: FAMILY MEDICINE CLINIC | Facility: CLINIC | Age: 47
End: 2022-02-08
Payer: COMMERCIAL

## 2022-02-08 DIAGNOSIS — M54.16 LUMBAR RADICULOPATHY: Primary | ICD-10-CM

## 2022-02-08 DIAGNOSIS — M99.04 SOMATIC DYSFUNCTION OF SACRAL REGION: ICD-10-CM

## 2022-02-08 DIAGNOSIS — M99.03 SOMATIC DYSFUNCTION OF LUMBAR REGION: ICD-10-CM

## 2022-02-08 DIAGNOSIS — M99.05 SOMATIC DYSFUNCTION OF PELVIS REGION: ICD-10-CM

## 2022-02-08 PROCEDURE — 99213 OFFICE O/P EST LOW 20 MIN: CPT | Performed by: FAMILY MEDICINE

## 2022-02-12 ENCOUNTER — APPOINTMENT (OUTPATIENT)
Dept: LAB | Age: 47
End: 2022-02-12
Payer: COMMERCIAL

## 2022-02-12 DIAGNOSIS — M54.16 LUMBAR RADICULOPATHY: ICD-10-CM

## 2022-02-12 DIAGNOSIS — E55.9 VITAMIN D DEFICIENCY: ICD-10-CM

## 2022-02-12 DIAGNOSIS — R53.82 CHRONIC FATIGUE: ICD-10-CM

## 2022-02-12 DIAGNOSIS — Z13.220 LIPID SCREENING: ICD-10-CM

## 2022-02-12 DIAGNOSIS — Z11.59 ENCOUNTER FOR HEPATITIS C SCREENING TEST FOR LOW RISK PATIENT: ICD-10-CM

## 2022-02-12 DIAGNOSIS — Z13.1 DIABETES MELLITUS SCREENING: ICD-10-CM

## 2022-02-12 DIAGNOSIS — Z11.4 SCREENING FOR HIV WITHOUT PRESENCE OF RISK FACTORS: ICD-10-CM

## 2022-02-12 LAB
25(OH)D3 SERPL-MCNC: 10.7 NG/ML (ref 30–100)
ALBUMIN SERPL BCP-MCNC: 4.2 G/DL (ref 3.5–5)
ALP SERPL-CCNC: 99 U/L (ref 46–116)
ALT SERPL W P-5'-P-CCNC: 28 U/L (ref 12–78)
ANION GAP SERPL CALCULATED.3IONS-SCNC: 4 MMOL/L (ref 4–13)
AST SERPL W P-5'-P-CCNC: 18 U/L (ref 5–45)
BASOPHILS # BLD AUTO: 0.04 THOUSANDS/ΜL (ref 0–0.1)
BASOPHILS NFR BLD AUTO: 1 % (ref 0–1)
BILIRUB SERPL-MCNC: 0.61 MG/DL (ref 0.2–1)
BUN SERPL-MCNC: 17 MG/DL (ref 5–25)
CALCIUM SERPL-MCNC: 9.1 MG/DL (ref 8.3–10.1)
CHLORIDE SERPL-SCNC: 108 MMOL/L (ref 100–108)
CHOLEST SERPL-MCNC: 210 MG/DL
CO2 SERPL-SCNC: 28 MMOL/L (ref 21–32)
CREAT SERPL-MCNC: 0.6 MG/DL (ref 0.6–1.3)
EOSINOPHIL # BLD AUTO: 0.13 THOUSAND/ΜL (ref 0–0.61)
EOSINOPHIL NFR BLD AUTO: 3 % (ref 0–6)
ERYTHROCYTE [DISTWIDTH] IN BLOOD BY AUTOMATED COUNT: 13.7 % (ref 11.6–15.1)
GFR SERPL CREATININE-BSD FRML MDRD: 110 ML/MIN/1.73SQ M
GLUCOSE P FAST SERPL-MCNC: 92 MG/DL (ref 65–99)
HCT VFR BLD AUTO: 42.9 % (ref 34.8–46.1)
HCV AB SER QL: NORMAL
HDLC SERPL-MCNC: 69 MG/DL
HGB BLD-MCNC: 13.8 G/DL (ref 11.5–15.4)
IMM GRANULOCYTES # BLD AUTO: 0.01 THOUSAND/UL (ref 0–0.2)
IMM GRANULOCYTES NFR BLD AUTO: 0 % (ref 0–2)
LDLC SERPL CALC-MCNC: 126 MG/DL (ref 0–100)
LYMPHOCYTES # BLD AUTO: 1.3 THOUSANDS/ΜL (ref 0.6–4.47)
LYMPHOCYTES NFR BLD AUTO: 25 % (ref 14–44)
MCH RBC QN AUTO: 29.6 PG (ref 26.8–34.3)
MCHC RBC AUTO-ENTMCNC: 32.2 G/DL (ref 31.4–37.4)
MCV RBC AUTO: 92 FL (ref 82–98)
MONOCYTES # BLD AUTO: 0.52 THOUSAND/ΜL (ref 0.17–1.22)
MONOCYTES NFR BLD AUTO: 10 % (ref 4–12)
NEUTROPHILS # BLD AUTO: 3.19 THOUSANDS/ΜL (ref 1.85–7.62)
NEUTS SEG NFR BLD AUTO: 61 % (ref 43–75)
NRBC BLD AUTO-RTO: 0 /100 WBCS
PLATELET # BLD AUTO: 299 THOUSANDS/UL (ref 149–390)
PMV BLD AUTO: 11.3 FL (ref 8.9–12.7)
POTASSIUM SERPL-SCNC: 4 MMOL/L (ref 3.5–5.3)
PROT SERPL-MCNC: 7.8 G/DL (ref 6.4–8.2)
RBC # BLD AUTO: 4.67 MILLION/UL (ref 3.81–5.12)
SODIUM SERPL-SCNC: 140 MMOL/L (ref 136–145)
TRIGL SERPL-MCNC: 74 MG/DL
TSH SERPL DL<=0.05 MIU/L-ACNC: 1.39 UIU/ML (ref 0.36–3.74)
WBC # BLD AUTO: 5.19 THOUSAND/UL (ref 4.31–10.16)

## 2022-02-12 PROCEDURE — 84443 ASSAY THYROID STIM HORMONE: CPT

## 2022-02-12 PROCEDURE — 86803 HEPATITIS C AB TEST: CPT

## 2022-02-12 PROCEDURE — 80053 COMPREHEN METABOLIC PANEL: CPT

## 2022-02-12 PROCEDURE — 85025 COMPLETE CBC W/AUTO DIFF WBC: CPT

## 2022-02-12 PROCEDURE — 36415 COLL VENOUS BLD VENIPUNCTURE: CPT

## 2022-02-12 PROCEDURE — 87389 HIV-1 AG W/HIV-1&-2 AB AG IA: CPT

## 2022-02-12 PROCEDURE — 86038 ANTINUCLEAR ANTIBODIES: CPT

## 2022-02-12 PROCEDURE — 82306 VITAMIN D 25 HYDROXY: CPT

## 2022-02-12 PROCEDURE — 80061 LIPID PANEL: CPT

## 2022-02-14 LAB
HIV 1+2 AB+HIV1 P24 AG SERPL QL IA: NORMAL
RYE IGE QN: NEGATIVE

## 2022-02-21 ENCOUNTER — TELEMEDICINE (OUTPATIENT)
Dept: FAMILY MEDICINE CLINIC | Facility: CLINIC | Age: 47
End: 2022-02-21
Payer: COMMERCIAL

## 2022-02-21 DIAGNOSIS — M54.16 LUMBAR RADICULOPATHY: ICD-10-CM

## 2022-02-21 DIAGNOSIS — M54.41 ACUTE RIGHT-SIDED LOW BACK PAIN WITH RIGHT-SIDED SCIATICA: ICD-10-CM

## 2022-02-21 DIAGNOSIS — E55.9 VITAMIN D DEFICIENCY: Primary | ICD-10-CM

## 2022-02-21 PROCEDURE — 99214 OFFICE O/P EST MOD 30 MIN: CPT | Performed by: FAMILY MEDICINE

## 2022-02-21 RX ORDER — ERGOCALCIFEROL 1.25 MG/1
50000 CAPSULE ORAL WEEKLY
Qty: 12 CAPSULE | Refills: 0 | Status: SHIPPED | OUTPATIENT
Start: 2022-02-21 | End: 2022-05-10

## 2022-02-21 RX ORDER — NAPROXEN 500 MG/1
500 TABLET ORAL 2 TIMES DAILY PRN
Qty: 20 TABLET | Refills: 0 | Status: SHIPPED | OUTPATIENT
Start: 2022-02-21 | End: 2022-06-21 | Stop reason: SDUPTHER

## 2022-02-21 NOTE — ASSESSMENT & PLAN NOTE
Persistent and worsening  Will come for next OMT tx this week and consider imaging based on exam and response at that visit

## 2022-02-21 NOTE — PROGRESS NOTES
Virtual Regular Visit    Verification of patient location:    Patient is located in the following state in which I hold an active license [place]      Assessment/Plan:    Problem List Items Addressed This Visit        Nervous and Auditory    Lumbar radiculopathy     Persistent and worsening  Will come for next OMT tx this week and consider imaging based on exam and response at that visit  Other    Vitamin D deficiency - Primary     Profoundly deficient (10 7) - 12 week supplementation ordered  Relevant Medications    ergocalciferol (VITAMIN D2) 50,000 units      Other Visit Diagnoses     Acute right-sided low back pain with right-sided sciatica        Relevant Medications    naproxen (NAPROSYN) 500 mg tablet               Reason for visit is   Chief Complaint   Patient presents with    Virtual Regular Visit        Encounter provider Galen Perez MD    Provider located at [address] [place] [address]  [phone]      Recent Visits  No visits were found meeting these conditions  Showing recent visits within past 7 days and meeting all other requirements  Today's Visits  Date Type Provider Dept   02/21/22 Telemedicine Galen Perez MD [address] today's visits and meeting all other requirements  Future Appointments  No visits were found meeting these conditions  Showing future appointments within next 150 days and meeting all other requirements       The patient was identified by name and date of birth  Raymundo Mejia was informed that this is a telemedicine visit and that the visit is being conducted through [facility] and patient was informed this is a secure, HIPAA-complaint platform  She agrees to proceed     My office door was closed  No one else was in the room  She acknowledged consent and understanding of privacy and security of the video platform   The patient has agreed to participate and understands they can discontinue the visit at any time  Patient is aware this is a billable service  Subjective  Tono Orozco is a 40 y o  female seen in followup of labs and back pain  For the past week, her back has been worse, with R leg symptoms  Having trouble with even controlling R leg  Has a treatment for OMT this week  Renetta Mcguire has been working and starting college classes, which likely contributes to her back aching more  Vit D 10 7   TChol 205   NYASIA neg  CMP WNL  CBC WNL  TSH WNL  HPI     Past Medical History:   Diagnosis Date    Asthma     Back pain     History of thyroid disorder     History of tinea cruris     History of vaginal discharge     Microscopic hematuria     History    Papilloma of tongue     History    Vaginal candidiasis     History       History reviewed  No pertinent surgical history  Current Outpatient Medications   Medication Sig Dispense Refill    cyclobenzaprine (FLEXERIL) 10 mg tablet Take 1 tablet (10 mg total) by mouth 3 (three) times a day as needed for muscle spasms 21 tablet 0    ergocalciferol (VITAMIN D2) 50,000 units Take 1 capsule (50,000 Units total) by mouth once a week for 12 doses 12 capsule 0    naproxen (NAPROSYN) 500 mg tablet Take 1 tablet (500 mg total) by mouth 2 (two) times a day as needed for mild pain 20 tablet 0     No current facility-administered medications for this visit  No Known Allergies    Review of Systems   Constitutional: Negative for activity change, chills, fatigue and fever  HENT: Negative for congestion, ear pain, sinus pressure, sinus pain and sore throat  Eyes: Negative for pain and visual disturbance  Respiratory: Negative for cough, shortness of breath and wheezing  Cardiovascular: Negative for chest pain, palpitations and leg swelling  Gastrointestinal: Negative for abdominal pain, diarrhea, nausea and vomiting     Genitourinary: Negative for decreased urine volume, dysuria, frequency, hematuria and urgency  Musculoskeletal: Positive for back pain  Negative for arthralgias, myalgias, neck pain and neck stiffness  Skin: Negative for color change and rash  Neurological: Negative for dizziness, seizures, syncope, light-headedness, numbness and headaches  All other systems reviewed and are negative  Video Exam    There were no vitals filed for this visit  Physical Exam  Constitutional:       General: She is not in acute distress  Appearance: She is well-developed  She is not diaphoretic  HENT:      Head: Normocephalic and atraumatic  Eyes:      General: No scleral icterus  Right eye: No discharge  Left eye: No discharge  Conjunctiva/sclera: Conjunctivae normal       Pupils: Pupils are equal, round, and reactive to light  Pulmonary:      Effort: Pulmonary effort is normal    Neurological:      Mental Status: She is alert and oriented to person, place, and time  Psychiatric:         Behavior: Behavior normal          Thought Content: Thought content normal          Judgment: Judgment normal           I spent 15 minutes directly with the patient during this visit    VIRTUAL VISIT [address] verbally agrees to participate in [employer]  Pt is aware that [employer] could be limited without vital signs or the ability to perform a full hands-on physical Jonah Srinivasan understands she or the provider may request at any time to terminate the video visit and request the patient to seek care or treatment in person

## 2022-05-19 ENCOUNTER — ANNUAL EXAM (OUTPATIENT)
Dept: OBGYN CLINIC | Facility: CLINIC | Age: 47
End: 2022-05-19
Payer: COMMERCIAL

## 2022-05-19 VITALS
WEIGHT: 128 LBS | SYSTOLIC BLOOD PRESSURE: 122 MMHG | DIASTOLIC BLOOD PRESSURE: 76 MMHG | BODY MASS INDEX: 21.85 KG/M2 | HEIGHT: 64 IN

## 2022-05-19 DIAGNOSIS — Z01.419 PAP SMEAR, AS PART OF ROUTINE GYNECOLOGICAL EXAMINATION: ICD-10-CM

## 2022-05-19 DIAGNOSIS — Z87.42 HISTORY OF OVARIAN CYST: ICD-10-CM

## 2022-05-19 DIAGNOSIS — Z01.411 ENCOUNTER FOR GYNECOLOGICAL EXAMINATION WITH ABNORMAL FINDING: Primary | ICD-10-CM

## 2022-05-19 DIAGNOSIS — Z12.31 ENCOUNTER FOR SCREENING MAMMOGRAM FOR MALIGNANT NEOPLASM OF BREAST: ICD-10-CM

## 2022-05-19 DIAGNOSIS — R87.7 LOW GRADE SQUAMOUS INTRAEPITHELIAL LESION (LGSIL) ON BIOPSY OF CERVIX: ICD-10-CM

## 2022-05-19 PROCEDURE — G0145 SCR C/V CYTO,THINLAYER,RESCR: HCPCS | Performed by: OBSTETRICS & GYNECOLOGY

## 2022-05-19 PROCEDURE — G0476 HPV COMBO ASSAY CA SCREEN: HCPCS | Performed by: OBSTETRICS & GYNECOLOGY

## 2022-05-19 PROCEDURE — 0503F POSTPARTUM CARE VISIT: CPT | Performed by: OBSTETRICS & GYNECOLOGY

## 2022-05-19 PROCEDURE — 99396 PREV VISIT EST AGE 40-64: CPT | Performed by: OBSTETRICS & GYNECOLOGY

## 2022-05-19 NOTE — PROGRESS NOTES
Assessment/Plan:    No problem-specific Assessment & Plan notes found for this encounter  Diagnoses and all orders for this visit:    Encounter for gynecological examination with abnormal finding  -     Liquid-based pap, screening  -     HPV High Risk    Pap smear, as part of routine gynecological examination    Encounter for screening mammogram for malignant neoplasm of breast  -     Mammo screening bilateral w 3d & cad; Future    Low grade squamous intraepithelial lesion (LGSIL) on biopsy of cervix    History of ovarian cyst  -     US pelvis complete non OB; Future          Normal gynecological physical examination  Self-breast examination stressed  Mammogram ordered  Discussed regular exercise, healthy diet, importance of vitamin D and calcium supplements  Discussed importance of sun block use during periods of prolonged sun exposure  Patient will be seen in 1 year for routine gynecologic and medical examination  Patient will call office for any problems, concerns, or issues which may arise during the interim  Patient has history of ovarian cysts and pelvic ultrasound will be ordered as well  Subjective:          HPI    Patient ID: Virgil Khan is a 56 y o  female who presents today for her annual gynecologic and medical examination    Menstrual status:  Patient reports that her menstrual cycles are still fairly normal   She denies any excessive bleeding or clotting  She also denies any significant pelvic or abdominal pain  She denies any significant bleeding or spotting between cycles as well  Vasomotor symptoms:  Patient denies any significant vasomotor symptoms at this time      Patient reports normal appetite    Patient reports normal bowel and bladder habits    Patient denies any significant pelvic or abdominal pain    Patient denies any headaches, chest pain, shortness of breath fever shakes or chills    Patient denies any COVID 19 symptoms including cough or loss of taste or smell    COVID vaccine status:  Patient is up-to-date with COVID vaccination    Medical problems:  Patient currently is not being followed for any significant medical problems    Colonoscopy status:  Patient has not had screening colonoscopy at this time    Mammogram status: The importance of self-breast examinations stressed to the patient and she is up-to-date with screening mammography  Appropriate arrangements for her annual screening mammogram were placed into the EMR system at today's visit  The following portions of the patient's history were reviewed and updated as appropriate: allergies, current medications, past family history, past medical history, past social history, past surgical history and problem list     Review of Systems   Constitutional: Negative  Negative for appetite change, diaphoresis, fatigue, fever and unexpected weight change  HENT: Negative  Eyes: Negative  Respiratory: Negative  Cardiovascular: Negative  Gastrointestinal: Negative  Negative for abdominal pain, blood in stool, constipation, diarrhea, nausea and vomiting  Endocrine: Negative  Negative for cold intolerance and heat intolerance  Genitourinary: Negative  Negative for dysuria, frequency, hematuria, urgency, vaginal bleeding, vaginal discharge and vaginal pain  Musculoskeletal: Negative  Skin: Negative  Allergic/Immunologic: Negative  Neurological: Negative  Hematological: Negative  Negative for adenopathy  Psychiatric/Behavioral: Negative  Objective:      /76 (BP Location: Left arm, Patient Position: Sitting, Cuff Size: Standard)   Ht 5' 4" (1 626 m)   Wt 58 1 kg (128 lb)   BMI 21 97 kg/m²          Physical Exam  Constitutional:       General: She is not in acute distress  Appearance: Normal appearance  She is well-developed  She is not diaphoretic  HENT:      Head: Normocephalic and atraumatic     Eyes:      Pupils: Pupils are equal, round, and reactive to light  Cardiovascular:      Rate and Rhythm: Normal rate and regular rhythm  Heart sounds: Normal heart sounds  No murmur heard  No friction rub  No gallop  Pulmonary:      Effort: Pulmonary effort is normal       Breath sounds: Normal breath sounds  Chest:   Breasts: Breasts are symmetrical       Right: No inverted nipple, mass, nipple discharge, skin change, tenderness or supraclavicular adenopathy  Left: No inverted nipple, mass, nipple discharge, skin change, tenderness or supraclavicular adenopathy  Abdominal:      General: Bowel sounds are normal       Palpations: Abdomen is soft  Genitourinary:     General: Normal vulva  Exam position: Supine  Labia:         Right: No rash or lesion  Left: No rash or lesion  Vagina: Normal  No vaginal discharge, erythema, tenderness or bleeding  Cervix: No discharge or friability  Uterus: Not enlarged and not tender  Adnexa:         Right: No mass, tenderness or fullness  Left: No mass, tenderness or fullness  Rectum: Normal  Guaiac result negative  Musculoskeletal:         General: Normal range of motion  Cervical back: Normal range of motion and neck supple  Lymphadenopathy:      Cervical: No cervical adenopathy  Upper Body:      Right upper body: No supraclavicular adenopathy  Left upper body: No supraclavicular adenopathy  Skin:     General: Skin is warm and dry  Findings: No rash  Neurological:      General: No focal deficit present  Mental Status: She is alert and oriented to person, place, and time  Psychiatric:         Mood and Affect: Mood normal          Speech: Speech normal          Behavior: Behavior normal          Thought Content:  Thought content normal          Judgment: Judgment normal

## 2022-05-20 LAB
HPV HR 12 DNA CVX QL NAA+PROBE: NEGATIVE
HPV16 DNA CVX QL NAA+PROBE: NEGATIVE
HPV18 DNA CVX QL NAA+PROBE: NEGATIVE

## 2022-05-25 NOTE — PATIENT INSTRUCTIONS
Normal gynecological physical examination  Self-breast examination stressed  Mammogram ordered  Discussed regular exercise, healthy diet, importance of vitamin D and calcium supplements  Discussed importance of sun block use during periods of prolonged sun exposure  Patient will be seen in 1 year for routine gynecologic and medical examination  Patient will call office for any problems, concerns, or issues which may arise during the interim    Pelvic ultrasound will be ordered in light of patient's history of ovarian cysts

## 2022-05-26 LAB
LAB AP GYN PRIMARY INTERPRETATION: NORMAL
Lab: NORMAL

## 2022-06-21 ENCOUNTER — OFFICE VISIT (OUTPATIENT)
Dept: FAMILY MEDICINE CLINIC | Facility: CLINIC | Age: 47
End: 2022-06-21
Payer: COMMERCIAL

## 2022-06-21 VITALS
OXYGEN SATURATION: 99 % | RESPIRATION RATE: 20 BRPM | HEIGHT: 64 IN | TEMPERATURE: 98.7 F | HEART RATE: 98 BPM | WEIGHT: 125.38 LBS | BODY MASS INDEX: 21.4 KG/M2 | SYSTOLIC BLOOD PRESSURE: 122 MMHG | DIASTOLIC BLOOD PRESSURE: 76 MMHG

## 2022-06-21 DIAGNOSIS — J32.0 CHRONIC MAXILLARY SINUSITIS: ICD-10-CM

## 2022-06-21 DIAGNOSIS — M54.41 ACUTE RIGHT-SIDED LOW BACK PAIN WITH RIGHT-SIDED SCIATICA: ICD-10-CM

## 2022-06-21 DIAGNOSIS — E55.9 VITAMIN D DEFICIENCY: Primary | ICD-10-CM

## 2022-06-21 DIAGNOSIS — M51.26 LUMBAR DISC HERNIATION: ICD-10-CM

## 2022-06-21 DIAGNOSIS — M54.16 LUMBAR RADICULOPATHY: ICD-10-CM

## 2022-06-21 PROCEDURE — 99213 OFFICE O/P EST LOW 20 MIN: CPT | Performed by: CHIROPRACTOR

## 2022-06-21 RX ORDER — CYCLOBENZAPRINE HCL 10 MG
10 TABLET ORAL 3 TIMES DAILY PRN
Qty: 21 TABLET | Refills: 0 | Status: SHIPPED | OUTPATIENT
Start: 2022-06-21

## 2022-06-21 RX ORDER — NAPROXEN 500 MG/1
500 TABLET ORAL 2 TIMES DAILY PRN
Qty: 20 TABLET | Refills: 0 | Status: SHIPPED | OUTPATIENT
Start: 2022-06-21

## 2022-06-21 NOTE — ASSESSMENT & PLAN NOTE
Patient completed course of 50,000 International Units but has not had her levels checked and is not on any follow-up maintenance dosages     Obtain current vitamin-D level

## 2022-06-21 NOTE — ASSESSMENT & PLAN NOTE
Based upon the patient's history, and physical examination she has chronic maxillary sinusitis worse on the right than the left associated with postnasal drip most likely  In light of considerably swollen nasal turbinates she will continue to use saline nasal spray as well as oral decongestants as necessary  Given the chronicity and severity, a referral to ENT was placed

## 2022-06-21 NOTE — LETTER
June 21, 2022     Patient: Diana Parham  YOB: 1975  Date of Visit: 6/21/2022      To Whom it May Concern:    Diana Parham is under my professional care  Kevin Duong was seen in my office on 6/21/2022  Kevin Duong may return to school on now after an absence on 05/31  If you have any questions or concerns, please don't hesitate to call           Sincerely,          Leonora Phillip MD        CC: No Recipients

## 2022-06-21 NOTE — ASSESSMENT & PLAN NOTE
Patient has persistent signs and symptoms of lumbar HNP with radiculopathy despite continued and protracted conservative care  Expressed concerns to patient over motor and sensory deficits present on today's exam   It is been sometime she since she has had any imaging  She is interested in pursuing this further including with NSG consultation if indicated       Lumbar spine MRI ordered

## 2022-06-21 NOTE — PROGRESS NOTES
Family Medicine Follow-Up Office Visit  Fannie Sauer 56 y o  female   MRN: [id number] : 1975  ENCOUNTER: 2022 3:01 PM    Assessment and Plan   Lumbar radiculopathy  Right LE Sx predomoinate    Lumbar disc herniation    Patient has persistent signs and symptoms of lumbar HNP with radiculopathy despite continued and protracted conservative care  Expressed concerns to patient over motor and sensory deficits present on today's exam   It is been sometime she since she has had any imaging  She is interested in pursuing this further including with NSG consultation if indicated  Lumbar spine MRI ordered    Vitamin D deficiency   Patient completed course of 50,000 International Units but has not had her levels checked and is not on any follow-up maintenance dosages     Obtain current vitamin-D level    Chronic maxillary sinusitis   Based upon the patient's history, and physical examination she has chronic maxillary sinusitis worse on the right than the left associated with postnasal drip most likely  In light of considerably swollen nasal turbinates she will continue to use saline nasal spray as well as oral decongestants as necessary  Given the chronicity and severity, a referral to ENT was placed  Chief Complaint     Chief Complaint   Patient presents with    Follow-up       History of Present Illness   Fannie Sauer is a 56y o -year-old female who presents today for Follow-up office visit  At present, she is bothered primarily about persistent low back pain right lower extremity pain which so far has been resistant to all forms of conservative care as well as epidural blocks  Her pain was so bad the end of May she needed to miss some time in school  As a consequence of her gait alteration and back and leg pain on the right, she is developing left patellofemoral pain stiffness and soreness   She also states that since experiencing COVID, she has had significant difficulty with apparent recurrent sinusitis, she also feels some postnasal drip and irritation in her throat as well  Lastly, the patient was diagnosed with vitamin-D deficiency, completed her course of 50,000 vitamin-D weekly however has not had her levels checked in some time  Review of Systems   Review of Systems   Constitutional: Negative for chills and fever  HENT: Positive for postnasal drip, sinus pressure and sinus pain  Respiratory: Positive for cough  Negative for shortness of breath  Cardiovascular: Negative for chest pain  Gastrointestinal: Negative for diarrhea, nausea and vomiting  Musculoskeletal: Positive for arthralgias (Left knee), back pain and gait problem  Active Problem List     Patient Active Problem List   Diagnosis    Bulging lumbar disc    Cough variant asthma    Dense breast tissue    Fatigue    Lumbar disc herniation    Lumbar radiculopathy    Moderate persistent asthma    Microscopic hematuria    Peripheral neuropathy    Thyroid nodule    Vitamin B12 deficiency    Vitamin D deficiency    Cyst of left ovary    Abdominal pain in female    COVID-19    Chronic maxillary sinusitis       Past Medical History, Past Surgical History, Family History, and Social History were reviewed and updated today as appropriate  Objective   /76 (BP Location: Right arm, Patient Position: Sitting, Cuff Size: Standard)   Pulse 98   Temp 98 7 °F (37 1 °C) (Temporal)   Resp 20   Ht 5' 4" (1 626 m)   Wt 56 9 kg (125 lb 6 oz)   SpO2 99%   BMI 21 52 kg/m²     Physical Exam  HENT:      Head: Normocephalic  Right Ear: Tympanic membrane and ear canal normal       Left Ear: Tympanic membrane and ear canal normal       Nose: Congestion present  No rhinorrhea  Comments: Markedly swollen and injected nasal turbinates especially right side    Maxillary sinus tenderness present bilaterally     Mouth/Throat:      Mouth: Mucous membranes are moist       Pharynx: No oropharyngeal exudate or posterior oropharyngeal erythema  Eyes:      General: No scleral icterus  Cardiovascular:      Rate and Rhythm: Normal rate and regular rhythm  Heart sounds: No murmur heard  Pulmonary:      Breath sounds: No wheezing or rales  Musculoskeletal:      Right lower leg: No edema  Left lower leg: No edema  Skin:     General: Skin is warm  Findings: No rash  Neurological:      Mental Status: She is alert  Deep Tendon Reflexes: Reflexes abnormal (1+ right knee and ankle 2+ left)  Comments: Decreased sensation to touch right L4 and 5 distribution  Grade 4/5 weakness of EHL on the right foot           Pertinent Laboratory/Diagnostic Studies:  Lab Results   Component Value Date    GLUCOSE 111 06/03/2017    BUN 17 02/12/2022    CREATININE 0 60 02/12/2022    CALCIUM 9 1 02/12/2022     01/17/2015    K 4 0 02/12/2022    CO2 28 02/12/2022     02/12/2022     Lab Results   Component Value Date    ALT 28 02/12/2022    AST 18 02/12/2022    ALKPHOS 99 02/12/2022    BILITOT 0 3 01/17/2015       Lab Results   Component Value Date    WBC 5 19 02/12/2022    HGB 13 8 02/12/2022    HCT 42 9 02/12/2022    MCV 92 02/12/2022     02/12/2022       No results found for: TSH    No results found for: CHOL  Lab Results   Component Value Date    TRIG 74 02/12/2022     Lab Results   Component Value Date    HDL 69 02/12/2022     Lab Results   Component Value Date    LDLCALC 126 (H) 02/12/2022     No results found for: HGBA1C    Results for orders placed or performed in visit on 05/19/22   HPV High Risk    Specimen: Cervix;  Thin-Prep Vial   Result Value Ref Range    HPV Other HR Negative Negative    HPV16 Negative Negative    HPV18 Negative Negative   Liquid-based pap, screening   Result Value Ref Range    Case Report       Gynecologic Cytology Report                       Case: [id number]                                  Authorizing Provider:  Zee Ruiz MD   Collected: 05/19/2022 1548              Ordering Location:     [email or web address] Received:            05/19/2022 1548                                     GYN                                                                          First Screen:          Yi Copper                                                                  Specimen:    LIQUID-BASED PAP, SCREENING, Cervix                                                        Primary Interpretation Negative for intraepithelial lesion or malignancy     Specimen Adequacy       Satisfactory for evaluation  Endocervical/transformation zone component present  Additional Information       [employer] FDA approved ,  and ThinPrep Imaging Duo System are utilized with strict adherence to the 's instruction manual to prepare gynecologic and non-gynecologic cytology specimens for the production of ThinPrep slides as well as for gynecologic ThinPrep imaging  These processes have been validated by our laboratory and/or by the   The Pap test is not a diagnostic procedure and should not be used as the sole means to detect cervical cancer  It is only a screening procedure to aid in the detection of cervical cancer and its precursors  Both false-negative and false-positive results have been experienced  Your patient's test result should be interpreted in this context together with the history and clinical findings           Orders Placed This Encounter   Procedures    MRI lumbar spine w wo contrast    Vitamin D 25 hydroxy    Ambulatory Referral to Otolaryngology         Current Medications     Current Outpatient Medications   Medication Sig Dispense Refill    cyclobenzaprine (FLEXERIL) 10 mg tablet Take 1 tablet (10 mg total) by mouth 3 (three) times a day as needed for muscle spasms 21 tablet 0    loratadine-pseudoephedrine (CLARITIN-D 12-HOUR) 5-120 mg per tablet Take 1 tablet by mouth 2 (two) times a day 60 tablet 0    naproxen (NAPROSYN) 500 mg tablet Take 1 tablet (500 mg total) by mouth 2 (two) times a day as needed for mild pain 20 tablet 0    ergocalciferol (VITAMIN D2) 50,000 units Take 1 capsule (50,000 Units total) by mouth once a week for 12 doses 12 capsule 0     No current facility-administered medications for this visit         ALLERGIES:  No Known Allergies    Health Maintenance     Health Maintenance   Topic Date Due    Pneumococcal Vaccine: Pediatrics (0 to 5 Years) and At-Risk Patients (6 to 59 Years) (1 - PCV) Never done    Breast Cancer Screening: Mammogram  01/29/2020    Colorectal Cancer Screening  Never done    COVID-19 Vaccine (3 - Booster for Pfizer series) 10/16/2021    Depression Screening  05/13/2022    Influenza Vaccine (Season Ended) 09/01/2022    Annual Physical  05/19/2023    BMI: Adult  06/21/2023    Cervical Cancer Screening  05/19/2025    DTaP,Tdap,and Td Vaccines (2 - Td or Tdap) 03/01/2026    HIV Screening  Completed    Hepatitis C Screening  Completed    HIB Vaccine  Aged Out    Hepatitis B Vaccine  Aged Out    IPV Vaccine  Aged Out    Hepatitis A Vaccine  Aged Out    Meningococcal ACWY Vaccine  Aged Out    HPV Vaccine  Aged Out     Immunization History   Administered Date(s) Administered    COVID-19 PFIZER VACCINE 0 3 ML IM 04/16/2021, 05/16/2021    Influenza Quadrivalent Preservative Free 3 years and older IM 03/01/2016    Tdap 03/01/2016         Gayla Castillo MD   [address]  6/21/2022  3:01 PM    Parts of this note were dictated using Tant[employer] dictation software and may have

## 2022-06-23 ENCOUNTER — ULTRASOUND (OUTPATIENT)
Dept: OBGYN CLINIC | Facility: CLINIC | Age: 47
End: 2022-06-23
Payer: COMMERCIAL

## 2022-06-23 DIAGNOSIS — N83.202 CYST OF LEFT OVARY: Primary | ICD-10-CM

## 2022-06-23 PROCEDURE — 76856 US EXAM PELVIC COMPLETE: CPT | Performed by: OBSTETRICS & GYNECOLOGY

## 2022-06-23 NOTE — PROGRESS NOTES
AMB US Pelvic Non OB    Date/Time: 6/23/2022 3:07 PM  Performed by: Virginia Suero  Authorized by: Izaiah Carter MD     Procedure details:     Indications: ovarian cysts      Technique:  US Pelvic, Non-OB with complete exam  Uterine findings:     Length (cm): 7 3    Height (cm):  4 9    Width (cm):  3 6    Uterine adhesions: not identified      Adnexal mass: not identified      Polyps: not identified      Myomas: not identified      Endometrial stripe: identified      Endometrial hyperplasia: not identified      Endometrium thickness (mm):  5  Left ovary findings:     Left ovary:  Visualized    Cysts: not identified      Length (cm): 2 8    Height (cm): 2 4    Width (cm): 3  Right ovary findings:     Right ovary:  Visualized    Cysts: not identified      Length (cm): 2 5    Height (cm): 2 6    Width (cm): 1 7  Other findings:     Free pelvic fluid: not identified      Free peritoneal fluid: not identified    Post-Procedure Details:     Impression:  Endo-5mm  No cyst seen today    Tolerance:   Tolerated well, no immediate complications

## 2022-06-27 PROCEDURE — 99283 EMERGENCY DEPT VISIT LOW MDM: CPT

## 2022-06-28 ENCOUNTER — HOSPITAL ENCOUNTER (EMERGENCY)
Facility: HOSPITAL | Age: 47
Discharge: HOME | End: 2022-06-28
Attending: EMERGENCY MEDICINE
Payer: COMMERCIAL

## 2022-06-28 VITALS
DIASTOLIC BLOOD PRESSURE: 115 MMHG | OXYGEN SATURATION: 99 % | TEMPERATURE: 98.4 F | SYSTOLIC BLOOD PRESSURE: 153 MMHG | RESPIRATION RATE: 18 BRPM | HEART RATE: 94 BPM

## 2022-06-28 DIAGNOSIS — J06.9 UPPER RESPIRATORY INFECTION: ICD-10-CM

## 2022-06-28 DIAGNOSIS — J32.9 SINUSITIS: Primary | ICD-10-CM

## 2022-06-28 LAB
BILIRUB UR QL STRIP: NEGATIVE
CLARITY UR: CLEAR
COLOR UR: YELLOW
EXT PREG TEST URINE: NEGATIVE
EXT. CONTROL ED NAV: NORMAL
FLUAV RNA RESP QL NAA+PROBE: NEGATIVE
FLUBV RNA RESP QL NAA+PROBE: NEGATIVE
GLUCOSE UR STRIP-MCNC: NEGATIVE MG/DL
HGB UR QL STRIP.AUTO: NEGATIVE
KETONES UR STRIP-MCNC: NEGATIVE MG/DL
LEUKOCYTE ESTERASE UR QL STRIP: NEGATIVE
NITRITE UR QL STRIP: NEGATIVE
PH UR STRIP.AUTO: 7.5 [PH] (ref 4.5–8)
PROT UR STRIP-MCNC: NEGATIVE MG/DL
RSV RNA RESP QL NAA+PROBE: NEGATIVE
SARS-COV-2 RNA RESP QL NAA+PROBE: NEGATIVE
SP GR UR STRIP.AUTO: 1.02 (ref 1–1.03)
UROBILINOGEN UR QL STRIP.AUTO: 1 E.U./DL

## 2022-06-28 PROCEDURE — 81025 URINE PREGNANCY TEST: CPT | Performed by: EMERGENCY MEDICINE

## 2022-06-28 PROCEDURE — 99284 EMERGENCY DEPT VISIT MOD MDM: CPT | Performed by: EMERGENCY MEDICINE

## 2022-06-28 PROCEDURE — 81003 URINALYSIS AUTO W/O SCOPE: CPT

## 2022-06-28 PROCEDURE — 0241U HB NFCT DS VIR RESP RNA 4 TRGT: CPT | Performed by: EMERGENCY MEDICINE

## 2022-06-28 RX ORDER — AMOXICILLIN AND CLAVULANATE POTASSIUM 875; 125 MG/1; MG/1
1 TABLET, FILM COATED ORAL EVERY 12 HOURS
Qty: 14 TABLET | Refills: 0 | Status: SHIPPED | OUTPATIENT
Start: 2022-06-28 | End: 2022-07-05

## 2022-06-28 RX ORDER — AMOXICILLIN 250 MG/1
500 CAPSULE ORAL ONCE
Status: COMPLETED | OUTPATIENT
Start: 2022-06-28 | End: 2022-06-28

## 2022-06-28 RX ADMIN — AMOXICILLIN 500 MG: 250 CAPSULE ORAL at 02:06

## 2022-06-28 NOTE — ED PROVIDER NOTES
History  Chief Complaint   Patient presents with    Nasal Congestion     Pt c/o congestion/headache, sore throat x10 days  Patient is a 45-year-old female with a past medical history of asthma who presents for evaluation of nasal congestion  Patient states that for the past 10 days, she has had nasal congestion, sinus pressure, sinus pain, headaches, sore throat and fevers  Patient states that during this time, she did have 1 day where she was coughing, but this resolved spontaneously and patient has not had any issues with coughing since  Patient states that she has been taking over-the-counter cold and flu medications without significant relief in symptoms  Patient did receive her COVID vaccines, but did not receive the flu vaccine this year  Denies any known sick contacts  On review systems, patient does endorse some recent urinary frequency, otherwise negative for chest pain, shortness of breath, abdominal pain, nausea, vomiting, dysuria, or other symptoms  Prior to Admission Medications   Prescriptions Last Dose Informant Patient Reported? Taking?    cyclobenzaprine (FLEXERIL) 10 mg tablet   No No   Sig: Take 1 tablet (10 mg total) by mouth 3 (three) times a day as needed for muscle spasms   ergocalciferol (VITAMIN D2) 50,000 units   No No   Sig: Take 1 capsule (50,000 Units total) by mouth once a week for 12 doses   loratadine-pseudoephedrine (CLARITIN-D 12-HOUR) 5-120 mg per tablet   No No   Sig: Take 1 tablet by mouth 2 (two) times a day   naproxen (NAPROSYN) 500 mg tablet   No No   Sig: Take 1 tablet (500 mg total) by mouth 2 (two) times a day as needed for mild pain      Facility-Administered Medications: None       Past Medical History:   Diagnosis Date    Asthma     Back pain     History of thyroid disorder     History of tinea cruris     History of vaginal discharge     Microscopic hematuria     History    Papilloma of tongue     History    Vaginal candidiasis     History History reviewed  No pertinent surgical history  Family History   Problem Relation Age of Onset    Hypertension Mother     Mental illness Mother         Disorder    Substance Abuse Mother     Breast cancer Mother 27    Thyroid cancer Mother         Metastasis from thyroid cancer    Ovarian cancer Mother 48    Diabetes type I Mother         mellitus with other kidney complication    Diabetes type II Mother         without complication, without long term current use of insulin     Cancer Father     Hypertension Maternal Grandmother     Cancer Paternal Grandfather     Ovarian cancer Maternal Aunt      I have reviewed and agree with the history as documented  E-Cigarette/Vaping    E-Cigarette Use Never User      E-Cigarette/Vaping Substances     Social History     Tobacco Use    Smoking status: Never Smoker    Smokeless tobacco: Never Used   Vaping Use    Vaping Use: Never used   Substance Use Topics    Alcohol use: No    Drug use: No        Review of Systems   Constitutional: Positive for fever  Negative for chills  HENT: Positive for congestion, sinus pressure, sinus pain and sore throat  Negative for rhinorrhea  Respiratory: Negative for cough and shortness of breath  Cardiovascular: Negative for chest pain  Gastrointestinal: Negative for abdominal pain, nausea and vomiting  Genitourinary: Positive for frequency  Musculoskeletal: Negative  Skin: Negative  Neurological: Positive for headaches  All other systems reviewed and are negative        Physical Exam  ED Triage Vitals [06/28/22 0002]   Temperature Pulse Respirations Blood Pressure SpO2   98 4 °F (36 9 °C) 94 18 (!) 153/115 99 %      Temp Source Heart Rate Source Patient Position - Orthostatic VS BP Location FiO2 (%)   Oral Monitor Sitting Left arm --      Pain Score       --             Orthostatic Vital Signs  Vitals:    06/28/22 0002   BP: (!) 153/115   Pulse: 94   Patient Position - Orthostatic VS: Sitting Physical Exam  Vitals and nursing note reviewed  Constitutional:       General: She is awake  She is not in acute distress  Appearance: She is not toxic-appearing  HENT:      Head: Normocephalic and atraumatic  Nose: Congestion present  Mouth/Throat:      Lips: Pink  Mouth: Mucous membranes are moist       Pharynx: Oropharynx is clear  No pharyngeal swelling, oropharyngeal exudate or posterior oropharyngeal erythema  Eyes:      General: Vision grossly intact  Gaze aligned appropriately  Cardiovascular:      Rate and Rhythm: Normal rate and regular rhythm  Heart sounds: Normal heart sounds  Pulmonary:      Effort: Pulmonary effort is normal  No respiratory distress  Breath sounds: Normal breath sounds  Abdominal:      Palpations: Abdomen is soft  Tenderness: There is no abdominal tenderness  Musculoskeletal:      Cervical back: Full passive range of motion without pain and neck supple  Lymphadenopathy:      Cervical: Cervical adenopathy present  Right cervical: Superficial cervical adenopathy present  Left cervical: Superficial cervical adenopathy present  Skin:     General: Skin is warm and dry  Neurological:      General: No focal deficit present  Mental Status: She is alert and oriented to person, place, and time  ED Medications  Medications   amoxicillin (AMOXIL) capsule 500 mg (500 mg Oral Given 6/28/22 0206)       Diagnostic Studies  Results Reviewed     Procedure Component Value Units Date/Time    COVID/FLU/RSV [[id number]] Collected: 06/28/22 0207    Lab Status:  In process Specimen: Nares from Nose Updated: 06/28/22 0216    POCT pregnancy, urine [[id number]]  (Normal) Resulted: 06/28/22 0212    Lab Status: Final result Updated: 06/28/22 0212     EXT PREG TEST UR (Ref: Negative) NEGATIVE     Control VALID    Urine Macroscopic, POC [[id number]] Collected: 06/28/22 0208    Lab Status: Final result Specimen: Urine Updated: 06/28/22 0209     Color, UA Yellow     Clarity, UA Clear     pH, UA 7 5     Leukocytes, UA Negative     Nitrite, UA Negative     Protein, UA Negative mg/dl      Glucose, UA Negative mg/dl      Ketones, UA Negative mg/dl      Urobilinogen, UA 1 0 E U /dl      Bilirubin, UA Negative     Occult Blood, UA Negative     Specific Gravity, UA 1 020    Narrative:      CLINITEK RESULT                 No orders to display         Procedures  Procedures      ED Course                                       MDM  Number of Diagnoses or Management Options  Sinusitis: new and requires workup  Upper respiratory infection: new and requires workup  Diagnosis management comments: 71-year-old female presents for evaluation of sinus pain, pressure, and congestion of 10 days duration with associated fevers, sore throat, and headaches  On exam, patient in no acute distress, vitals within normal limits  Patient with maxillary sinus tenderness and congestion  Posterior oropharynx clear without exudate or swelling  Lungs clear to auscultation bilaterally  Suspect viral illnesses cause of patient's symptoms, however, with 10 days of sinus pressure, pain, and congestion, will treat for possible bacterial sinusitis  Patient evaluated with COVID/flu/RSV swab, and given dose of amoxicillin  Patient also endorsing racing urinary frequency, will evaluate with urine pregnancy and urinalysis  Patient's pregnancy test is negative, UA without signs of infection  Patient opted for discharge prior to receiving results of the COVID/flu/RSV swab  Patient told that she could access her results online or that she would be called with results when they were available  Patient given prescription for Augmentin for treatment of sinusitis  Patient discharged home in stable condition with symptomatic care instructions and strict ED return precautions         Amount and/or Complexity of Data Reviewed  Clinical lab tests: ordered and reviewed    Patient Progress  Patient progress: stable      Disposition  Final diagnoses:   Sinusitis   Upper respiratory infection     Time reflects when diagnosis was documented in both MDM as applicable and the Disposition within this note     Time User Action Codes Description Comment    6/28/2022  2:28 AM Rea Bailey Add [J32 9] Sinusitis     6/28/2022  2:28 AM Rea Bailey Add [J06 9] Upper respiratory infection       ED Disposition     ED Disposition   Discharge    Condition   Stable    Date/Time   Tue Jun 28, 2022  2:28 AM    Comment   Alcira Curry discharge to home/self care  Follow-up Information    None         Patient's Medications   Discharge Prescriptions    AMOXICILLIN-CLAVULANATE (AUGMENTIN) 875-125 MG PER TABLET    Take 1 tablet by mouth every 12 (twelve) hours for 7 days       Start Date: 6/28/2022 End Date: 7/5/2022       Order Dose: 1 tablet       Quantity: 14 tablet    Refills: 0     No discharge procedures on file  PDMP Review     None           ED Provider  Attending physically available and evaluated Alcira Curry I managed the patient along with the ED Attending      Electronically Signed by         Josef Milan DO  07/14/22 1920

## 2022-06-28 NOTE — ED ATTENDING ATTESTATION
6/27/2022  Marisa Brady DO, saw and evaluated the patient  I have discussed the patient with the resident/non-physician practitioner and agree with the resident's/non-physician practitioner's findings, Plan of Care, and MDM as documented in the resident's/non-physician practitioner's note, except where noted  All available labs and Radiology studies were reviewed  I was present for key portions of any procedure(s) performed by the resident/non-physician practitioner and I was immediately available to provide assistance  At this point I agree with the current assessment done in the Emergency Department  I have conducted an independent evaluation of this patient a history and physical is as follows:    57 yo woman c/o URI symptoms for 10 days - nasal congestion, sinus pressure, cough x 1 day no longer present, low grade temps at home, sore throat  Has been taking OTC meds without much improvement  COVID vaccinated x 2, not vaccinated against flu  TTP over maxillary sinuses B  Lungs CTAB    Imp: URI, sinusitis  Likely viral, but symptoms persisting 10 days plan: Multiplex swab, UA  Consider abx therapy  Decongestants      ED Course         Critical Care Time  Procedures

## 2022-06-28 NOTE — DISCHARGE INSTRUCTIONS
Take antibiotics as prescribed  Continue to take decongestant medications  You may use other therapies such as nasal saline sprays, humidifier, and/or flonase  Follow up with your PCP if symptoms do not improve over the next few days  Return to the emergency department for any new or worsening symptoms including worsening fevers, pain, difficulty breathing, visual changes, worsening headaches, or other concerning symptoms

## 2022-06-28 NOTE — Clinical Note
Zoraida Moreno was seen and treated in our emergency department on 6/27/2022  Diagnosis: Sinusitis    Kori  is off the rest of the shift today, may return to work on return date  She may return on this date: 06/29/2022         If you have any questions or concerns, please don't hesitate to call        Yefri Rosales DO    ______________________________           _______________          _______________  Hospital Representative                              Date                                Time

## 2022-06-29 NOTE — PROGRESS NOTES
Pelvic ultrasound results were normal     No ovarian cyst seen today     Patient to return for routine follow-up as planned     Thanks

## 2022-07-01 ENCOUNTER — TELEPHONE (OUTPATIENT)
Dept: OBGYN CLINIC | Facility: CLINIC | Age: 47
End: 2022-07-01

## 2022-09-26 ENCOUNTER — HOSPITAL ENCOUNTER (EMERGENCY)
Facility: HOSPITAL | Age: 47
Discharge: HOME | End: 2022-09-27
Attending: EMERGENCY MEDICINE
Payer: COMMERCIAL

## 2022-09-26 VITALS
RESPIRATION RATE: 20 BRPM | SYSTOLIC BLOOD PRESSURE: 151 MMHG | TEMPERATURE: 97.9 F | HEART RATE: 96 BPM | OXYGEN SATURATION: 100 % | DIASTOLIC BLOOD PRESSURE: 100 MMHG

## 2022-09-26 DIAGNOSIS — M54.50 ACUTE EXACERBATION OF CHRONIC LOW BACK PAIN: Primary | ICD-10-CM

## 2022-09-26 DIAGNOSIS — G89.29 ACUTE EXACERBATION OF CHRONIC LOW BACK PAIN: Primary | ICD-10-CM

## 2022-09-26 PROCEDURE — 99283 EMERGENCY DEPT VISIT LOW MDM: CPT

## 2022-09-26 RX ORDER — ACETAMINOPHEN 325 MG/1
975 TABLET ORAL ONCE
Status: COMPLETED | OUTPATIENT
Start: 2022-09-27 | End: 2022-09-27

## 2022-09-26 RX ORDER — LIDOCAINE 50 MG/G
1 PATCH TOPICAL ONCE
Status: DISCONTINUED | OUTPATIENT
Start: 2022-09-27 | End: 2022-09-27 | Stop reason: HOSPADM

## 2022-09-27 PROCEDURE — 99284 EMERGENCY DEPT VISIT MOD MDM: CPT | Performed by: EMERGENCY MEDICINE

## 2022-09-27 PROCEDURE — 96372 THER/PROPH/DIAG INJ SC/IM: CPT

## 2022-09-27 RX ORDER — KETOROLAC TROMETHAMINE 30 MG/ML
15 INJECTION, SOLUTION INTRAMUSCULAR; INTRAVENOUS ONCE
Status: COMPLETED | OUTPATIENT
Start: 2022-09-27 | End: 2022-09-27

## 2022-09-27 RX ADMIN — ACETAMINOPHEN 975 MG: 325 TABLET, FILM COATED ORAL at 00:10

## 2022-09-27 RX ADMIN — KETOROLAC TROMETHAMINE 15 MG: 30 INJECTION, SOLUTION INTRAMUSCULAR; INTRAVENOUS at 00:20

## 2022-09-27 RX ADMIN — LIDOCAINE 5% 1 PATCH: 700 PATCH TOPICAL at 00:10

## 2022-09-27 NOTE — DISCHARGE INSTRUCTIONS
Please follow up with comprehensive spine   If you develop any incontinence, new numbness please return to the ED

## 2022-09-27 NOTE — ED ATTENDING ATTESTATION
9/26/2022  IKatey MD, saw and evaluated the patient  I have discussed the patient with the resident/non-physician practitioner and agree with the resident's/non-physician practitioner's findings, Plan of Care, and MDM as documented in the resident's/non-physician practitioner's note, except where noted  All available labs and Radiology studies were reviewed  I was present for key portions of any procedure(s) performed by the resident/non-physician practitioner and I was immediately available to provide assistance  At this point I agree with the current assessment done in the Emergency Department  I have conducted an independent evaluation of this patient a history and physical is as follows:    ED Course  ED Course as of 09/27/22 0206   Tue Sep 27, 2022   0026 Per resident h&p 55 YO F with back pain and sciatica for years; known herniated disc; has been taking naprosyn; slipped down stairs tonight and hurt her back; no incontinence; no saddle anesthesia; no AC; pain is moderately severe; had some relief with naprosyn O: R paraspinal lumbar tender; I/P musculoskeletal pain; ketorolac; lidoderm patch     Emergency Department Note- Kranthi Marie 56 y o  female MRN: [id number]    Unit/Bed#: ED 10 Encounter: [id number]    Kranthi Marie is a 56 y o  female who presents with   Chief Complaint   Patient presents with    Back Pain     Patient reports chronic lower back pain that radiates down right leg  Pt reports in the past 3 hours today pain has increased a lot  8/10 pain  Pt reports taking naproxen 500mg  History of Present Illness   HPI:  Kranthi Marie is a 56 y o  female who presents for evaluation of:  Exacerbation of chronic lower back pain after fall down some steps tonight  The patient took some Naprosyn at home with mild relief  The patient is able to walk independently; she denies loss of bowel and bladder continence; she denies perineal anesthesia    She sees pain management for her low back pain chronically  The pain is moderate intensity and is located primarily in her right lower back  Bending provokes the discomfort  The pain is an aching sensation  Review of Systems   Constitutional: Negative for chills and fever  HENT: Negative for congestion and rhinorrhea  Respiratory: Negative for cough and shortness of breath  Cardiovascular: Negative for chest pain and palpitations  Gastrointestinal: Negative for nausea and vomiting  Musculoskeletal: Positive for back pain  Negative for neck pain  Neurological: Negative for light-headedness and headaches  All other systems reviewed and are negative  Historical Information   Past Medical History:   Diagnosis Date    Asthma     Back pain     History of thyroid disorder     History of tinea cruris     History of vaginal discharge     Microscopic hematuria     History    Papilloma of tongue     History    Vaginal candidiasis     History     No past surgical history on file  Social History   Social History     Substance and Sexual Activity   Alcohol Use No     Social History     Substance and Sexual Activity   Drug Use No     Social History     Tobacco Use   Smoking Status Never Smoker   Smokeless Tobacco Never Used     Family History:   Family History   Problem Relation Age of Onset    Hypertension Mother     Mental illness Mother         Disorder    Substance Abuse Mother     Breast cancer Mother 27    Thyroid cancer Mother         Metastasis from thyroid cancer    Ovarian cancer Mother 48    Diabetes type I Mother         mellitus with other kidney complication    Diabetes type II Mother         without complication, without long term current use of insulin     Cancer Father     Hypertension Maternal Grandmother     Cancer Paternal Grandfather     Ovarian cancer Maternal Aunt        Meds/Allergies   PTA meds:   Prior to Admission Medications   Prescriptions Last Dose Informant Patient Reported? Taking? cyclobenzaprine (FLEXERIL) 10 mg tablet   No No   Sig: Take 1 tablet (10 mg total) by mouth 3 (three) times a day as needed for muscle spasms   ergocalciferol (VITAMIN D2) 50,000 units   No No   Sig: Take 1 capsule (50,000 Units total) by mouth once a week for 12 doses   naproxen (NAPROSYN) 500 mg tablet   No No   Sig: Take 1 tablet (500 mg total) by mouth 2 (two) times a day as needed for mild pain      Facility-Administered Medications: None     No Known Allergies    Objective   First Vitals:   Blood Pressure: 151/100 (09/26/22 2336)  Pulse: 96 (09/26/22 2335)  Temperature: 97 9 °F (36 6 °C) (09/26/22 2337)  Temp Source: Oral (09/26/22 2337)  Respirations: 20 (09/26/22 2337)  SpO2: 100 % (09/26/22 2336)    Current Vitals:   Blood Pressure: 151/100 (09/26/22 2336)  Pulse: 96 (09/26/22 2335)  Temperature: 97 9 °F (36 6 °C) (09/26/22 2337)  Temp Source: Oral (09/26/22 2337)  Respirations: 20 (09/26/22 2337)  SpO2: 100 % (09/26/22 2336)    No intake or output data in the 24 hours ending 09/27/22 0206    Invasive Devices  Report    None                 Physical Exam  Vitals and nursing note reviewed  Constitutional:       General: She is not in acute distress  Appearance: Normal appearance  She is well-developed  HENT:      Head: Normocephalic and atraumatic  Right Ear: External ear normal       Left Ear: External ear normal       Nose: Nose normal       Mouth/Throat:      Pharynx: No oropharyngeal exudate  Eyes:      Conjunctiva/sclera: Conjunctivae normal       Pupils: Pupils are equal, round, and reactive to light  Cardiovascular:      Rate and Rhythm: Normal rate and regular rhythm  Pulmonary:      Effort: Pulmonary effort is normal  No respiratory distress  Abdominal:      General: Abdomen is flat  There is no distension  Palpations: Abdomen is soft  Musculoskeletal:         General: No deformity  Normal range of motion        Cervical back: Normal range of motion and neck supple  Skin:     General: Skin is warm and dry  Capillary Refill: Capillary refill takes less than 2 seconds  Neurological:      General: No focal deficit present  Mental Status: She is alert and oriented to person, place, and time  Mental status is at baseline  Coordination: Coordination normal    Psychiatric:         Mood and Affect: Mood normal          Behavior: Behavior normal          Thought Content: Thought content normal          Judgment: Judgment normal            Medical Decision Makin  Acute exacerbation low back pain:  Musculoskeletal; NSAIDs; warm moist heat application  No results found for this or any previous visit (from the past 36 hour(s))  No orders to display         Portions of the record may have been created with voice recognition software  Occasional wrong word or "sound a like" substitutions may have occurred due to the inherent limitations of voice recognition software  Read the chart carefully and recognize, using context, where substitutions have occurred          Critical Care Time  Procedures

## 2022-09-27 NOTE — ED PROVIDER NOTES
History  Chief Complaint   Patient presents with    Back Pain     Patient reports chronic lower back pain that radiates down right leg  Pt reports in the past 3 hours today pain has increased a lot  8/10 pain  Pt reports taking naproxen 500mg  HPI  Patient is a 60-year-old female with chronic low back pain presenting with acute worsening of chronic back pain  Patient is following pain management and has been diagnosed with lumbar herniation in the past   Chronic back pain has been going on for years  Patient takes naproxen for pain relief  States that 3 hours prior to arrival patient slid down the stairs about 5 steps and landed on her lower back  Since then her 4/10 pain has increased to 7/10  Took 1 dose of naproxen with minimal relief  Denies any new numbness or weakness  Does not have any incontinence  Pain is localized mostly to the right side of her lumbar spine and denies any midline tenderness  Prior to Admission Medications   Prescriptions Last Dose Informant Patient Reported? Taking? cyclobenzaprine (FLEXERIL) 10 mg tablet   No No   Sig: Take 1 tablet (10 mg total) by mouth 3 (three) times a day as needed for muscle spasms   ergocalciferol (VITAMIN D2) 50,000 units   No No   Sig: Take 1 capsule (50,000 Units total) by mouth once a week for 12 doses   naproxen (NAPROSYN) 500 mg tablet   No No   Sig: Take 1 tablet (500 mg total) by mouth 2 (two) times a day as needed for mild pain      Facility-Administered Medications: None       Past Medical History:   Diagnosis Date    Asthma     Back pain     History of thyroid disorder     History of tinea cruris     History of vaginal discharge     Microscopic hematuria     History    Papilloma of tongue     History    Vaginal candidiasis     History       No past surgical history on file      Family History   Problem Relation Age of Onset    Hypertension Mother     Mental illness Mother         Disorder    Substance Abuse Mother    Flakito Pert Breast cancer Mother 27    Thyroid cancer Mother         Metastasis from thyroid cancer    Ovarian cancer Mother 48    Diabetes type I Mother         mellitus with other kidney complication    Diabetes type II Mother         without complication, without long term current use of insulin     Cancer Father     Hypertension Maternal Grandmother     Cancer Paternal Grandfather     Ovarian cancer Maternal Aunt      I have reviewed and agree with the history as documented  E-Cigarette/Vaping    E-Cigarette Use Never User      E-Cigarette/Vaping Substances     Social History     Tobacco Use    Smoking status: Never Smoker    Smokeless tobacco: Never Used   Vaping Use    Vaping Use: Never used   Substance Use Topics    Alcohol use: No    Drug use: No        Review of Systems   Constitutional: Negative for chills, diaphoresis, fever and unexpected weight change  HENT: Negative for ear pain and sore throat  Eyes: Negative for visual disturbance  Respiratory: Negative for cough, chest tightness and shortness of breath  Cardiovascular: Negative for chest pain and leg swelling  Gastrointestinal: Negative for abdominal distention, abdominal pain, constipation, diarrhea, nausea and vomiting  Endocrine: Negative  Genitourinary: Negative for difficulty urinating and dysuria  Musculoskeletal: Positive for back pain  Skin: Negative  Allergic/Immunologic: Negative  Neurological: Negative  Hematological: Negative  Psychiatric/Behavioral: Negative  All other systems reviewed and are negative        Physical Exam  ED Triage Vitals   Temperature Pulse Respirations Blood Pressure SpO2   09/26/22 2337 09/26/22 2335 09/26/22 2337 09/26/22 2336 09/26/22 2336   97 9 °F (36 6 °C) 96 20 151/100 100 %      Temp Source Heart Rate Source Patient Position - Orthostatic VS BP Location FiO2 (%)   09/26/22 2337 09/26/22 2335 09/26/22 2336 09/26/22 2336 --   Oral Monitor Sitting Right arm       Pain Score       09/27/22 0010       8             Orthostatic Vital Signs  Vitals:    09/26/22 2335 09/26/22 2336   BP:  151/100   Pulse: 96    Patient Position - Orthostatic VS:  Sitting       Physical Exam  Vitals and nursing note reviewed  Constitutional:       General: She is not in acute distress  Appearance: Normal appearance  She is not ill-appearing  HENT:      Head: Normocephalic and atraumatic  Right Ear: External ear normal       Left Ear: External ear normal       Nose: Nose normal       Mouth/Throat:      Mouth: Mucous membranes are moist       Pharynx: Oropharynx is clear  Eyes:      General: No scleral icterus  Right eye: No discharge  Left eye: No discharge  Extraocular Movements: Extraocular movements intact  Conjunctiva/sclera: Conjunctivae normal       Pupils: Pupils are equal, round, and reactive to light  Cardiovascular:      Rate and Rhythm: Normal rate and regular rhythm  Pulses: Normal pulses  Heart sounds: Normal heart sounds  Pulmonary:      Effort: Pulmonary effort is normal       Breath sounds: Normal breath sounds  Abdominal:      General: Abdomen is flat  Bowel sounds are normal  There is no distension  Palpations: Abdomen is soft  Tenderness: There is no abdominal tenderness  There is no guarding or rebound  Musculoskeletal:         General: Tenderness (Lumbar paraspinal) present  Normal range of motion  Cervical back: Normal range of motion and neck supple  Skin:     General: Skin is warm and dry  Capillary Refill: Capillary refill takes less than 2 seconds  Neurological:      General: No focal deficit present  Mental Status: She is alert and oriented to person, place, and time  Mental status is at baseline  Cranial Nerves: No cranial nerve deficit  Sensory: No sensory deficit  Motor: No weakness        Gait: Gait normal    Psychiatric:         Mood and Affect: Mood normal  Behavior: Behavior normal          Thought Content: Thought content normal          Judgment: Judgment normal          ED Medications  Medications   acetaminophen (TYLENOL) tablet 975 mg (975 mg Oral Given 9/27/22 0010)   ketorolac (TORADOL) injection 15 mg (15 mg Intramuscular Given 9/27/22 0020)       Diagnostic Studies  Results Reviewed     None                 No orders to display         Procedures  Procedures      ED Course                                       MDM  Number of Diagnoses or Management Options  Acute exacerbation of chronic low back pain  Diagnosis management comments:    56year old female with acute exacerbation of chronic low back pain   No significant midline tenderness worrisome for fracture   Patient given multimodal pain control with moderate pain relief   Patient discharged with instruction to follow up with comprehensive spine     Disposition  Final diagnoses:   Acute exacerbation of chronic low back pain     Time reflects when diagnosis was documented in both MDM as applicable and the Disposition within this note     Time User Action Codes Description Comment    9/27/2022 12:46 AM Marv Gardner [M54 50,  G89 29] Acute exacerbation of chronic low back pain       ED Disposition     ED Disposition   Discharge    Condition   Stable    Date/Time   Tue Sep 27, 2022 12:45 AM    Comment   Reymundo Garcia discharge to home/self care                 Follow-up Information     Follow up With Specialties Details Why Contact Info Additional Information     [facility] Physical Therapy Schedule an appointment as soon as possible for a visit   [phone]    [facility] [address] Emergency Department Emergency Medicine Go to  If symptoms worsen [address] Emergency Department, [address] [place] [address], [address]          Discharge Medication List as of 9/27/2022 12:47 AM      CONTINUE these medications which have NOT CHANGED    Details   cyclobenzaprine (FLEXERIL) 10 mg tablet Take 1 tablet (10 mg total) by mouth 3 (three) times a day as needed for muscle spasms, Starting Tue 6/21/2022, Normal      ergocalciferol (VITAMIN D2) 50,000 units Take 1 capsule (50,000 Units total) by mouth once a week for 12 doses, Starting Mon 2/21/2022, Until Tue 5/10/2022, Normal      naproxen (NAPROSYN) 500 mg tablet Take 1 tablet (500 mg total) by mouth 2 (two) times a day as needed for mild pain, Starting Tue 6/21/2022, Normal           No discharge procedures on file  PDMP Review     None           ED Provider  Attending physically available and evaluated Angel Richard I managed the patient along with the ED Attending      Electronically Signed by         Kaushik Galaviz MD  09/28/22 7724

## 2022-11-22 ENCOUNTER — HOSPITAL ENCOUNTER (EMERGENCY)
Facility: HOSPITAL | Age: 47
Discharge: HOME | End: 2022-11-23
Attending: EMERGENCY MEDICINE

## 2022-11-22 ENCOUNTER — APPOINTMENT (EMERGENCY)
Dept: RADIOLOGY | Facility: HOSPITAL | Age: 47
End: 2022-11-22

## 2022-11-22 VITALS
TEMPERATURE: 98 F | OXYGEN SATURATION: 98 % | RESPIRATION RATE: 18 BRPM | HEART RATE: 88 BPM | DIASTOLIC BLOOD PRESSURE: 94 MMHG | SYSTOLIC BLOOD PRESSURE: 145 MMHG

## 2022-11-22 DIAGNOSIS — R42 DIZZINESS: ICD-10-CM

## 2022-11-22 DIAGNOSIS — S06.0XAA CONCUSSION: Primary | ICD-10-CM

## 2022-11-22 RX ORDER — MECLIZINE HYDROCHLORIDE 25 MG/1
25 TABLET ORAL 3 TIMES DAILY PRN
Qty: 30 TABLET | Refills: 0 | Status: SHIPPED | OUTPATIENT
Start: 2022-11-22

## 2022-11-22 RX ORDER — ONDANSETRON 4 MG/1
4 TABLET, ORALLY DISINTEGRATING ORAL ONCE
Status: COMPLETED | OUTPATIENT
Start: 2022-11-22 | End: 2022-11-22

## 2022-11-22 RX ORDER — LIDOCAINE 50 MG/G
1 PATCH TOPICAL ONCE
Status: DISCONTINUED | OUTPATIENT
Start: 2022-11-22 | End: 2022-11-23 | Stop reason: HOSPADM

## 2022-11-22 RX ORDER — KETOROLAC TROMETHAMINE 30 MG/ML
15 INJECTION, SOLUTION INTRAMUSCULAR; INTRAVENOUS ONCE
Status: COMPLETED | OUTPATIENT
Start: 2022-11-22 | End: 2022-11-22

## 2022-11-22 RX ORDER — MECLIZINE HYDROCHLORIDE 25 MG/1
25 TABLET ORAL ONCE
Status: COMPLETED | OUTPATIENT
Start: 2022-11-22 | End: 2022-11-22

## 2022-11-22 RX ORDER — ONDANSETRON 4 MG/1
4 TABLET, FILM COATED ORAL EVERY 6 HOURS PRN
Qty: 12 TABLET | Refills: 0 | Status: SHIPPED | OUTPATIENT
Start: 2022-11-22

## 2022-11-22 RX ADMIN — MECLIZINE HYDROCHLORIDE 25 MG: 25 TABLET ORAL at 23:26

## 2022-11-22 RX ADMIN — ONDANSETRON 4 MG: 4 TABLET, ORALLY DISINTEGRATING ORAL at 23:26

## 2022-11-22 RX ADMIN — KETOROLAC TROMETHAMINE 15 MG: 30 INJECTION, SOLUTION INTRAMUSCULAR at 23:26

## 2022-11-22 RX ADMIN — LIDOCAINE 5% 1 PATCH: 700 PATCH TOPICAL at 23:26

## 2022-11-23 LAB
ATRIAL RATE: 82 BPM
ATRIAL RATE: 85 BPM
P AXIS: 43 DEGREES
P AXIS: 61 DEGREES
PR INTERVAL: 162 MS
PR INTERVAL: 164 MS
QRS AXIS: 40 DEGREES
QRS AXIS: 42 DEGREES
QRSD INTERVAL: 100 MS
QRSD INTERVAL: 102 MS
QT INTERVAL: 354 MS
QT INTERVAL: 358 MS
QTC INTERVAL: 413 MS
QTC INTERVAL: 426 MS
T WAVE AXIS: 64 DEGREES
T WAVE AXIS: 66 DEGREES
VENTRICULAR RATE: 82 BPM
VENTRICULAR RATE: 85 BPM

## 2022-11-23 NOTE — ED ATTENDING ATTESTATION
11/22/2022  ITaqueria MD, saw and evaluated the patient  I have discussed the patient with the resident/non-physician practitioner and agree with the resident's/non-physician practitioner's findings, Plan of Care, and MDM as documented in the resident's/non-physician practitioner's note, except where noted  All available labs and Radiology studies were reviewed  I was present for key portions of any procedure(s) performed by the resident/non-physician practitioner and I was immediately available to provide assistance  At this point I agree with the current assessment done in the Emergency Department  I have conducted an independent evaluation of this patient a history and physical is as follows:    ED Course     50-year-old female presenting to the emergency department for evaluation of vertiginous type dizziness and decreased appetite  Patient reports that she fell down a set of stairs 1 week ago  She reports sitting her head and denies loss of consciousness  She states that since that time she has had pain in the left paraspinal musculature and in the back of head  She states that over the past 3 days she has developed in vertiginous type symptoms getting to the point where she was unable to drive today  No vomiting  No change in vision  No difficulty speaking or swallowing  Ten systems reviewed and negative except as noted in the history of present illness  The patient is resting comfortably on a stretcher in no acute respiratory distress  The patient appears nontoxic  HEENT reveals moist mucous membranes  Head is normocephalic and atraumatic  Conjunctiva and sclera are normal  Neck is nontender and supple with full range of motion to flexion, extension, lateral rotation  No meningismus appreciated  No masses are appreciated  Lungs are clear to auscultation bilaterally without any wheezes, rales or rhonchi  Heart is regular rate and rhythm without any murmurs, rubs or gallops  Abdomen is soft and nontender without any rebound or guarding  Extremities appear grossly normal without any significant arthropathy  Patient is awake, alert, and oriented x3  The patient has normal interaction  GCS 15  Cranial nerves 2-12 are intact  Motor is 5 out of 5 bilateral upper lower extremities  No pronator drift  Normal finger-to-nose bilaterally  Normal gait  Negative Romberg  Post head injury vertigo consistent with concussion  CT head without contrast   Final Result      No acute intracranial abnormality                    Workstation performed: [id number]                       Critical Care Time  Procedures

## 2022-11-23 NOTE — DISCHARGE INSTRUCTIONS
You have been seen for a concussion  You should return to the ED if you develop mental status changes, weakness in an extremity, or other worsening symptoms  Follow up with the comprehensive concussion program and your primary care doctor  Take Meclizine for dizziness  Take Zofran for nausea or decreased appetite  Use Motrin, Advil, or Tylenol for headaches and neck pain  You may use over-the-counter Lidoderm patches for neck pain as well

## 2022-11-23 NOTE — ED PROVIDER NOTES
History  Chief Complaint   Patient presents with   • Dizziness     Pt fell down stairs a week & a half ago, (+) headstrike, (-) LOC & thinners  Was initially dizzy after the fall but let it go  Overtime the pt developed worsening dizziness, loss of appetite, & headache  Denies any blurred vision  C/o neck pain     60-year-old female with a past medical history of asthma presents with dizziness  Patient reports that she had a mechanical slip and fall down 12 stairs a week and half ago  Patient hit her head against a hardwood floor  She did not lose consciousness  Patient felt room spinning dizziness immediately after the event  She was having headache and neck pain  The dizziness subsided and she decided not to get evaluated  However, since then patient has had a room spinning dizziness every day  She notes that she still gets headaches in the back of her head and feels pain in the back of her neck as well  No numbness or tingling in her extremities  No weakness in any extremities  Patient does not feel lightheaded like she is going to pass out  She does not feel nauseous, but notes decreased appetite  Patient tried taking Tylenol a couple of times for her symptoms and states that did not help much  Patient decided to come in today because she states that the dizziness was worse today than it has been  She felt like she was not comfortable driving secondary to this  No new medications or symptoms of a viral illness  Prior to Admission Medications   Prescriptions Last Dose Informant Patient Reported? Taking?    cyclobenzaprine (FLEXERIL) 10 mg tablet   No No   Sig: Take 1 tablet (10 mg total) by mouth 3 (three) times a day as needed for muscle spasms   ergocalciferol (VITAMIN D2) 50,000 units   No No   Sig: Take 1 capsule (50,000 Units total) by mouth once a week for 12 doses   naproxen (NAPROSYN) 500 mg tablet   No No   Sig: Take 1 tablet (500 mg total) by mouth 2 (two) times a day as needed for mild pain      Facility-Administered Medications: None       Past Medical History:   Diagnosis Date   • Asthma    • Back pain    • History of thyroid disorder    • History of tinea cruris    • History of vaginal discharge    • Microscopic hematuria     History   • Papilloma of tongue     History   • Vaginal candidiasis     History       History reviewed  No pertinent surgical history  Family History   Problem Relation Age of Onset   • Hypertension Mother    • Mental illness Mother         Disorder   • Substance Abuse Mother    • Breast cancer Mother 27   • Thyroid cancer Mother         Metastasis from thyroid cancer   • Ovarian cancer Mother 48   • Diabetes type I Mother         mellitus with other kidney complication   • Diabetes type II Mother         without complication, without long term current use of insulin    • Cancer Father    • Hypertension Maternal Grandmother    • Cancer Paternal Grandfather    • Ovarian cancer Maternal Aunt      I have reviewed and agree with the history as documented  E-Cigarette/Vaping   • E-Cigarette Use Never User      E-Cigarette/Vaping Substances     Social History     Tobacco Use   • Smoking status: Never   • Smokeless tobacco: Never   Vaping Use   • Vaping Use: Never used   Substance Use Topics   • Alcohol use: No   • Drug use: No        Review of Systems   Constitutional: Positive for appetite change  Negative for chills, fatigue and fever  HENT: Negative for congestion, rhinorrhea and sore throat  Eyes: Negative for pain and redness  Respiratory: Negative for cough, chest tightness, shortness of breath and wheezing  Cardiovascular: Negative for chest pain and palpitations  Gastrointestinal: Negative for abdominal pain, diarrhea, nausea and vomiting  Endocrine: Negative  Genitourinary: Negative for difficulty urinating and hematuria  Musculoskeletal: Positive for neck pain  Negative for back pain and myalgias  Skin: Negative for pallor and rash  Allergic/Immunologic: Negative  Neurological: Positive for dizziness and headaches  Negative for weakness and light-headedness  Hematological: Negative  Physical Exam  ED Triage Vitals [11/22/22 2241]   Temperature Pulse Respirations Blood Pressure SpO2   98 °F (36 7 °C) 88 18 145/94 98 %      Temp Source Heart Rate Source Patient Position - Orthostatic VS BP Location FiO2 (%)   Oral Monitor Lying Right arm --      Pain Score       4             Orthostatic Vital Signs  Vitals:    11/22/22 2241   BP: 145/94   Pulse: 88   Patient Position - Orthostatic VS: Lying       Physical Exam  Vitals and nursing note reviewed  Constitutional:       General: She is not in acute distress  Appearance: Normal appearance  She is not ill-appearing  HENT:      Head: Normocephalic and atraumatic  Eyes:      Conjunctiva/sclera: Conjunctivae normal    Cardiovascular:      Rate and Rhythm: Normal rate and regular rhythm  Heart sounds: No murmur heard  Pulmonary:      Effort: Pulmonary effort is normal  No respiratory distress  Breath sounds: Normal breath sounds  No wheezing, rhonchi or rales  Abdominal:      General: Abdomen is flat  There is no distension  Palpations: Abdomen is soft  Musculoskeletal:         General: Normal range of motion  Cervical back: Normal range of motion and neck supple  No rigidity or tenderness (No midline tenderness of the C-spine, but paraspinal muscular tenderness is present bilaterally)  Skin:     General: Skin is warm and dry  Neurological:      General: No focal deficit present  Mental Status: She is alert and oriented to person, place, and time  Cranial Nerves: No cranial nerve deficit  Sensory: No sensory deficit  Motor: No weakness           ED Medications  Medications   meclizine (ANTIVERT) tablet 25 mg (25 mg Oral Given 11/22/22 2326)   ondansetron (ZOFRAN-ODT) dispersible tablet 4 mg (4 mg Oral Given 11/22/22 2326)   ketorolac (TORADOL) injection 15 mg (15 mg Intramuscular Given 11/22/22 5471)       Diagnostic Studies  Results Reviewed     None                 CT head without contrast   Final Result by Rubina Miller DO (11/23 0023)      No acute intracranial abnormality  Workstation performed: [id number]               Procedures  Procedures      ED Course  ED Course as of 11/24/22 2020 Tue Nov 22, 2022   2740 ECG 12 lead  The heart rate is 82, which is normal  The rhythm is regular  The axis is normal  The P waves are normal and the MD interval is normal  The QRS height is normal and width is normal  The ST segments are not elevated or depressed  The T waves are normal  The QT segment is normal  This ecg shows sinus rhythm  MDM  Number of Diagnoses or Management Options  Concussion: established and improving  Dizziness: established and improving  Diagnosis management comments: 52-year-old female presents with symptoms a concussion after a fall down stairs  Exam is normal   Explained to patient that imaging is not indicated because she would have symptoms of head bleed by now if her head were bleeding  Patient still feels more comfortable getting a CT of her head to rule out any kind of traumatic injury  No need for lab work as a concussion can be diagnosed clinically  Will give Toradol, meclizine, Lidoderm patch, and Zofran for symptoms  C-spine can be cleared based on nexus criteria and no C-spine imaging is needed  Symptoms improved after medications  Patient was given prescriptions for Zofran and Meclizine  Recommend NSAIDs for headaches  Recommend follow up with PCP and concussion program  Return precautions given  All questions answered            Amount and/or Complexity of Data Reviewed  Tests in the radiology section of CPT®: ordered and reviewed  Review and summarize past medical records: yes  Discuss the patient with other providers: yes    Risk of Complications, Morbidity, and/or Mortality  Presenting problems: low  Diagnostic procedures: low  Management options: low    Patient Progress  Patient progress: improved      Disposition  Final diagnoses:   Concussion   Dizziness     Time reflects when diagnosis was documented in both MDM as applicable and the Disposition within this note     Time User Action Codes Description Comment    11/22/2022 11:41 PM Krysten Schroeder Add [S06  0XAA] Concussion     11/22/2022 11:41 PM Yvonne Aguila Add [R42] Dizziness       ED Disposition     ED Disposition   Discharge    Condition   Stable    Date/Time   Wed Nov 23, 2022 12:56 AM    Comment   Josh Olson discharge to home/self care                 Follow-up Information     Follow up With Specialties Details Why Contact Info Additional [address] Siddiqui MD Family Medicine, Obstetrics and Gynecology, Obstetrics, Gynecology   [address] [facility]  [phone]       Physical Therapy at [address] [place] [address] Physical Therapy    [place] [address]  [phone] Physical Therapy at [address] [place] [address], [facility] [address]          Discharge Medication List as of 11/23/2022  1:00 AM      START taking these medications    Details   meclizine (ANTIVERT) 25 mg tablet Take 1 tablet (25 mg total) by mouth 3 (three) times a day as needed for dizziness, Starting Tue 11/22/2022, Normal      ondansetron (ZOFRAN) 4 mg tablet Take 1 tablet (4 mg total) by mouth every 6 (six) hours as needed for nausea or vomiting, Starting Tue 11/22/2022, Normal         CONTINUE these medications which have NOT CHANGED    Details   cyclobenzaprine (FLEXERIL) 10 mg tablet Take 1 tablet (10 mg total) by mouth 3 (three) times a day as needed for muscle spasms, Starting Tue 6/21/2022, Normal      ergocalciferol (VITAMIN D2) 50,000 units Take 1 capsule (50,000 Units total) by mouth once a week for 12 doses, Starting Mon 2/21/2022, Until Tue 5/10/2022, Normal      naproxen (NAPROSYN) 500 mg tablet Take 1 tablet (500 mg total) by mouth 2 (two) times a day as needed for mild pain, Starting Tue 6/21/2022, Normal               PDMP Review     None           ED Provider  Attending physically available and evaluated Krystal Scales  I managed the patient along with the ED Attending      Electronically Signed by         Daina Talbert DO  11/24/22 2020

## 2023-03-13 ENCOUNTER — HOSPITAL ENCOUNTER (EMERGENCY)
Facility: HOSPITAL | Age: 48
Discharge: HOME | End: 2023-03-14
Attending: EMERGENCY MEDICINE

## 2023-03-13 ENCOUNTER — APPOINTMENT (EMERGENCY)
Dept: RADIOLOGY | Facility: HOSPITAL | Age: 48
End: 2023-03-13

## 2023-03-13 VITALS
SYSTOLIC BLOOD PRESSURE: 145 MMHG | DIASTOLIC BLOOD PRESSURE: 87 MMHG | RESPIRATION RATE: 18 BRPM | HEART RATE: 88 BPM | TEMPERATURE: 98.2 F | OXYGEN SATURATION: 99 %

## 2023-03-13 DIAGNOSIS — M54.50 ACUTE EXACERBATION OF CHRONIC LOW BACK PAIN: Primary | ICD-10-CM

## 2023-03-13 DIAGNOSIS — G89.29 ACUTE EXACERBATION OF CHRONIC LOW BACK PAIN: Primary | ICD-10-CM

## 2023-03-13 NOTE — Clinical Note
Sylvia Velásquezjeanette was seen and treated in our emergency department on 3/13/2023  Diagnosis:     Zulma Roach    She may return on this date: 03/16/2023         If you have any questions or concerns, please don't hesitate to call        Cuong Butcher PA-C    ______________________________           _______________          _______________  Hospital Representative                              Date                                Time

## 2023-03-14 ENCOUNTER — TELEPHONE (OUTPATIENT)
Dept: PHYSICAL THERAPY | Facility: OTHER | Age: 48
End: 2023-03-14

## 2023-03-14 RX ORDER — LIDOCAINE 50 MG/G
1 PATCH TOPICAL ONCE
Status: DISCONTINUED | OUTPATIENT
Start: 2023-03-14 | End: 2023-03-14 | Stop reason: HOSPADM

## 2023-03-14 RX ORDER — KETOROLAC TROMETHAMINE 30 MG/ML
15 INJECTION, SOLUTION INTRAMUSCULAR; INTRAVENOUS ONCE
Status: COMPLETED | OUTPATIENT
Start: 2023-03-14 | End: 2023-03-14

## 2023-03-14 RX ORDER — METHOCARBAMOL 500 MG/1
500 TABLET, FILM COATED ORAL 4 TIMES DAILY
Qty: 20 TABLET | Refills: 0 | Status: SHIPPED | OUTPATIENT
Start: 2023-03-14 | End: 2023-03-19

## 2023-03-14 RX ORDER — NAPROXEN 500 MG/1
500 TABLET ORAL 2 TIMES DAILY WITH MEALS
Qty: 6 TABLET | Refills: 0 | Status: SHIPPED | OUTPATIENT
Start: 2023-03-14 | End: 2023-03-17

## 2023-03-14 RX ORDER — ACETAMINOPHEN 325 MG/1
650 TABLET ORAL ONCE
Status: COMPLETED | OUTPATIENT
Start: 2023-03-14 | End: 2023-03-14

## 2023-03-14 RX ADMIN — ACETAMINOPHEN 650 MG: 325 TABLET ORAL at 00:10

## 2023-03-14 RX ADMIN — KETOROLAC TROMETHAMINE 15 MG: 30 INJECTION, SOLUTION INTRAMUSCULAR; INTRAVENOUS at 00:09

## 2023-03-14 RX ADMIN — LIDOCAINE 1 PATCH: 50 PATCH CUTANEOUS at 00:09

## 2023-03-14 NOTE — ED PROVIDER NOTES
History  Chief Complaint   Patient presents with   • Back Pain     Reports falling on stairs re injuring back  Reports  b/l lower back pain, worse on lower right side  Reports usually takes naproxen and flexeril for back pain but ran out of scripts  Hx chronic back pain     Patient is a 52-year-old female with a history of chronic back pain that presents emerged department with cute on chronic back pain status post ground-level fall on stairs  Patient denies head strike and loss of consciousness  Patient states that she was walking down her basement stairs and slipped on a step and fell on her rocks on the stairs causing lumbar pain  Patient also states that she has a history of chronic back pain and currently out of naproxen and Flexeril and wishes refills on naproxen at this time  Patient denies saddle anesthesias and bowel bladder retention  Patient denies Pottle factors or provocative factors of bending at the waist and pressure to lumbar spine  Patient denies noneffective treatment  Patient denies fevers, chills, nausea, vomiting, diarrhea, constipation, or symptoms  Patient affirms recent fall; after mentioned, approximately 3 feet in height on stairs  Patient denies recent trauma  Patient denies sick contacts recent travel  Patient has chest pain, shortness breath, and abdominal pain  History provided by:  Patient   used: No        Prior to Admission Medications   Prescriptions Last Dose Informant Patient Reported? Taking?    cyclobenzaprine (FLEXERIL) 10 mg tablet   No No   Sig: Take 1 tablet (10 mg total) by mouth 3 (three) times a day as needed for muscle spasms   ergocalciferol (VITAMIN D2) 50,000 units   No No   Sig: Take 1 capsule (50,000 Units total) by mouth once a week for 12 doses   meclizine (ANTIVERT) 25 mg tablet   No No   Sig: Take 1 tablet (25 mg total) by mouth 3 (three) times a day as needed for dizziness   naproxen (NAPROSYN) 500 mg tablet   No No   Sig: Take 1 tablet (500 mg total) by mouth 2 (two) times a day as needed for mild pain   ondansetron (ZOFRAN) 4 mg tablet   No No   Sig: Take 1 tablet (4 mg total) by mouth every 6 (six) hours as needed for nausea or vomiting      Facility-Administered Medications: None       Past Medical History:   Diagnosis Date   • Asthma    • Back pain    • History of thyroid disorder    • History of tinea cruris    • History of vaginal discharge    • Microscopic hematuria     History   • Papilloma of tongue     History   • Vaginal candidiasis     History       History reviewed  No pertinent surgical history  Family History   Problem Relation Age of Onset   • Hypertension Mother    • Mental illness Mother         Disorder   • Substance Abuse Mother    • Breast cancer Mother 27   • Thyroid cancer Mother         Metastasis from thyroid cancer   • Ovarian cancer Mother 48   • Diabetes type I Mother         mellitus with other kidney complication   • Diabetes type II Mother         without complication, without long term current use of insulin    • Cancer Father    • Hypertension Maternal Grandmother    • Cancer Paternal Grandfather    • Ovarian cancer Maternal Aunt      I have reviewed and agree with the history as documented  E-Cigarette/Vaping   • E-Cigarette Use Never User      E-Cigarette/Vaping Substances     Social History     Tobacco Use   • Smoking status: Never   • Smokeless tobacco: Never   Vaping Use   • Vaping Use: Never used   Substance Use Topics   • Alcohol use: No   • Drug use: No       Review of Systems   Constitutional: Negative for activity change, appetite change, chills and fever  HENT: Negative for congestion, postnasal drip, rhinorrhea, sinus pressure, sinus pain, sore throat and tinnitus  Eyes: Negative for photophobia and visual disturbance  Respiratory: Negative for cough, chest tightness and shortness of breath  Cardiovascular: Negative for chest pain and palpitations     Gastrointestinal: Negative for abdominal pain, constipation, diarrhea, nausea and vomiting  Genitourinary: Negative for difficulty urinating, dysuria, flank pain, frequency and urgency  Musculoskeletal: Positive for back pain  Negative for gait problem, neck pain and neck stiffness  Skin: Negative for pallor and rash  Allergic/Immunologic: Negative for environmental allergies and food allergies  Neurological: Negative for dizziness, weakness, numbness and headaches  Psychiatric/Behavioral: Negative for confusion  All other systems reviewed and are negative  Physical Exam  Physical Exam  Vitals and nursing note reviewed  Constitutional:       General: She is awake  Appearance: Normal appearance  She is well-developed and normal weight  She is not ill-appearing, toxic-appearing or diaphoretic  Comments: /87   Pulse 88   Temp 98 2 °F (36 8 °C) (Oral)   Resp 18   SpO2 99%      HENT:      Head: Normocephalic and atraumatic  Jaw: There is normal jaw occlusion  Right Ear: Hearing, tympanic membrane and external ear normal  No decreased hearing noted  No drainage, swelling or tenderness  No mastoid tenderness  Left Ear: Hearing, tympanic membrane and external ear normal  No decreased hearing noted  No drainage, swelling or tenderness  No mastoid tenderness  Nose: Nose normal       Mouth/Throat:      Lips: Pink  Mouth: Mucous membranes are moist       Pharynx: Oropharynx is clear  Uvula midline  Eyes:      General: Lids are normal  Vision grossly intact  Gaze aligned appropriately  Right eye: No discharge  Left eye: No discharge  Extraocular Movements: Extraocular movements intact  Conjunctiva/sclera: Conjunctivae normal       Pupils: Pupils are equal, round, and reactive to light  Neck:      Vascular: No JVD  Trachea: Trachea and phonation normal  No tracheal tenderness or tracheal deviation     Cardiovascular:      Rate and Rhythm: Normal rate and regular rhythm  Pulses: Normal pulses  Radial pulses are 2+ on the right side and 2+ on the left side  Dorsalis pedis pulses are 2+ on the right side and 2+ on the left side  Posterior tibial pulses are 2+ on the right side and 2+ on the left side  Heart sounds: Normal heart sounds  Pulmonary:      Effort: Pulmonary effort is normal       Breath sounds: Normal breath sounds and air entry  No stridor  No decreased breath sounds, wheezing, rhonchi or rales  Chest:      Chest wall: No tenderness  Abdominal:      General: Abdomen is flat  Bowel sounds are normal  There is no distension  Palpations: Abdomen is soft  Abdomen is not rigid  Tenderness: There is no abdominal tenderness  There is no guarding or rebound  Musculoskeletal:         General: Normal range of motion  Cervical back: Full passive range of motion without pain, normal range of motion and neck supple  No rigidity  No spinous process tenderness or muscular tenderness  Normal range of motion  Comments: Passive ROM intact  Upper and lower extremity 5/5 bilaterally  Neurovascularly intact  No grinding or clicking of joints       Feet:      Right foot:      Toenail Condition: Right toenails are normal       Left foot:      Toenail Condition: Left toenails are normal    Lymphadenopathy:      Head:      Right side of head: No submental, submandibular, tonsillar, preauricular, posterior auricular or occipital adenopathy  Left side of head: No submental, submandibular, tonsillar, preauricular, posterior auricular or occipital adenopathy  Cervical: No cervical adenopathy  Right cervical: No superficial, deep or posterior cervical adenopathy  Left cervical: No superficial, deep or posterior cervical adenopathy  Skin:     General: Skin is warm  Capillary Refill: Capillary refill takes less than 2 seconds  Findings: No rash     Neurological:      General: No focal deficit present  Mental Status: She is alert and oriented to person, place, and time  Mental status is at baseline  GCS: GCS eye subscore is 4  GCS verbal subscore is 5  GCS motor subscore is 6  Cranial Nerves: Cranial nerves 2-12 are intact  Sensory: No sensory deficit  Deep Tendon Reflexes: Reflexes are normal and symmetric  Reflex Scores:       Patellar reflexes are 2+ on the right side and 2+ on the left side  Psychiatric:         Attention and Perception: Attention normal          Mood and Affect: Mood normal          Speech: Speech normal          Behavior: Behavior normal  Behavior is cooperative  Thought Content: Thought content normal          Judgment: Judgment normal          Vital Signs  ED Triage Vitals [03/13/23 2240]   Temperature Pulse Respirations Blood Pressure SpO2   98 2 °F (36 8 °C) 88 18 145/87 99 %      Temp Source Heart Rate Source Patient Position - Orthostatic VS BP Location FiO2 (%)   Oral Monitor -- -- --      Pain Score       8           Vitals:    03/13/23 2240   BP: 145/87   Pulse: 88         Visual Acuity      ED Medications  Medications   ketorolac (TORADOL) injection 15 mg (15 mg Intramuscular Given 3/14/23 0009)   acetaminophen (TYLENOL) tablet 650 mg (650 mg Oral Given 3/14/23 0010)       Diagnostic Studies  Results Reviewed     None                 XR spine lumbar 2 or 3 views injury    (Results Pending)              Procedures  Procedures         ED Course                                             Medical Decision Making  Patient is a 71-year-old female with a history of chronic back pain that presents emerged department with cute on chronic back pain status post ground-level fall on stairs  Patient denies head strike and loss of consciousness    Patient hemodynamically stable and afebrile  Patient denies saddle anesthesias, bowel and bladder retention-doubt cauda equina syndrome; no Meningeal signs  No focal neurological signs; strength bilateral lower extremity 5/5  Delivered Toradol, Tylenol, and Lidoderm patch emergency department; patient verbalizes decrease in back pain status post medication delivery  Prescribed Robaxin, and Naprosyn counseled patient on medication administration and side effects  Lumbar xray with no acute osseous abnormality: Scoliotic spine, degenerative changes on initial read  Follow-up with [facility]  Follow-up with PCP  Follow up with emergency department symptoms persist or exacerbate  Patient verbalized understanding of all clinical imaging findings, discharge instructions, follow-up, and verbalized agreement with current treatment plan  Amount and/or Complexity of Data Reviewed  Radiology: ordered  Decision-making details documented in ED Course  Risk  OTC drugs  Prescription drug management  Disposition  Final diagnoses:   Acute exacerbation of chronic low back pain     Time reflects when diagnosis was documented in both MDM as applicable and the Disposition within this note     Time User Action Codes Description Comment    3/14/2023 12:38 AM Ishaan Dejesus Add [M54 50,  G89 29] Acute exacerbation of chronic low back pain       ED Disposition     ED Disposition   Discharge    Condition   Stable    Date/Time   Tue Mar 14, 2023 12:38 AM    Comment   Ericka Sanabria discharge to home/self care                 Follow-up Information     Follow up With Specialties Details Why Contact Info Additional Information    Raven Nelson MD Family Medicine, Obstetrics and Gynecology, Obstetrics, Gynecology   [address] [place] [address]  [address] [facility] Emergency Department Emergency Medicine   [address] Emergency Department,  [address], [place], [address]          Discharge Medication List as of 3/14/2023 12:41 AM      START taking these medications Details   methocarbamol (ROBAXIN) 500 mg tablet Take 1 tablet (500 mg total) by mouth 4 (four) times a day for 5 days, Starting Tue 3/14/2023, Until Sun 3/19/2023, Normal      !! naproxen (Naprosyn) 500 mg tablet Take 1 tablet (500 mg total) by mouth 2 (two) times a day with meals for 3 days, Starting Tue 3/14/2023, Until Fri 3/17/2023, Normal       !! - Potential duplicate medications found  Please discuss with provider  CONTINUE these medications which have NOT CHANGED    Details   cyclobenzaprine (FLEXERIL) 10 mg tablet Take 1 tablet (10 mg total) by mouth 3 (three) times a day as needed for muscle spasms, Starting Tue 6/21/2022, Normal      ergocalciferol (VITAMIN D2) 50,000 units Take 1 capsule (50,000 Units total) by mouth once a week for 12 doses, Starting Mon 2/21/2022, Until Tue 5/10/2022, Normal      meclizine (ANTIVERT) 25 mg tablet Take 1 tablet (25 mg total) by mouth 3 (three) times a day as needed for dizziness, Starting Tue 11/22/2022, Normal      !! naproxen (NAPROSYN) 500 mg tablet Take 1 tablet (500 mg total) by mouth 2 (two) times a day as needed for mild pain, Starting Tue 6/21/2022, Normal      ondansetron (ZOFRAN) 4 mg tablet Take 1 tablet (4 mg total) by mouth every 6 (six) hours as needed for nausea or vomiting, Starting Tue 11/22/2022, Normal       !! - Potential duplicate medications found  Please discuss with provider                PDMP Review       Value Time User    PDMP Reviewed  Yes 3/14/2023 12:39 AM Delia Singletary PA-C          ED Provider  Electronically Signed by           Delia Singletary PA-C  03/14/23 0097

## 2023-03-14 NOTE — TELEPHONE ENCOUNTER
Call placed to the patient per [facility] referral     Patient did not answer phone when called, unable to LM since mailbox is full  This is the 1st attempt to reach the patient  Will defer per protocol

## 2023-03-17 NOTE — TELEPHONE ENCOUNTER
Call placed to the patient per [facility] referral      Patient did not answer phone when called, unable to leave v/m   "mailbox is full and cannot accept any messages at this time"      This is the 2nd attempt to reach the patient    Will defer per protocol

## 2023-03-24 NOTE — TELEPHONE ENCOUNTER
Call placed to the patient per [facility] referral     Unable to LM, full mailbox      This is the 3rd attempt to reach the patient  Referral closed

## 2023-06-19 LAB
FLUAV RNA RESP QL NAA+PROBE: NEGATIVE
FLUBV RNA RESP QL NAA+PROBE: NEGATIVE
RSV RNA RESP QL NAA+PROBE: NEGATIVE
SARS-COV-2 RNA RESP QL NAA+PROBE: NEGATIVE

## 2023-06-19 PROCEDURE — 99284 EMERGENCY DEPT VISIT MOD MDM: CPT

## 2023-06-19 PROCEDURE — 0241U HB NFCT DS VIR RESP RNA 4 TRGT: CPT | Performed by: EMERGENCY MEDICINE

## 2023-06-20 ENCOUNTER — HOSPITAL ENCOUNTER (EMERGENCY)
Facility: HOSPITAL | Age: 48
Discharge: HOME | End: 2023-06-20
Attending: EMERGENCY MEDICINE
Payer: COMMERCIAL

## 2023-06-20 ENCOUNTER — APPOINTMENT (EMERGENCY)
Dept: RADIOLOGY | Facility: HOSPITAL | Age: 48
End: 2023-06-20
Payer: COMMERCIAL

## 2023-06-20 VITALS
SYSTOLIC BLOOD PRESSURE: 151 MMHG | DIASTOLIC BLOOD PRESSURE: 97 MMHG | OXYGEN SATURATION: 99 % | HEART RATE: 73 BPM | RESPIRATION RATE: 18 BRPM | TEMPERATURE: 98.4 F

## 2023-06-20 DIAGNOSIS — J20.9 ACUTE BRONCHITIS: ICD-10-CM

## 2023-06-20 DIAGNOSIS — H65.93 MIDDLE EAR EFFUSION, BILATERAL: ICD-10-CM

## 2023-06-20 DIAGNOSIS — J06.9 VIRAL URI WITH COUGH: Primary | ICD-10-CM

## 2023-06-20 LAB — S PYO DNA THROAT QL NAA+PROBE: NOT DETECTED

## 2023-06-20 PROCEDURE — 87651 STREP A DNA AMP PROBE: CPT

## 2023-06-20 PROCEDURE — 71046 X-RAY EXAM CHEST 2 VIEWS: CPT

## 2023-06-20 RX ORDER — FLUTICASONE PROPIONATE 50 MCG
2 SPRAY, SUSPENSION (ML) NASAL ONCE
Status: COMPLETED | OUTPATIENT
Start: 2023-06-20 | End: 2023-06-20

## 2023-06-20 RX ORDER — ACETAMINOPHEN 325 MG/1
975 TABLET ORAL ONCE
Status: COMPLETED | OUTPATIENT
Start: 2023-06-20 | End: 2023-06-20

## 2023-06-20 RX ORDER — IBUPROFEN 600 MG/1
600 TABLET ORAL ONCE
Status: COMPLETED | OUTPATIENT
Start: 2023-06-20 | End: 2023-06-20

## 2023-06-20 RX ORDER — LIDOCAINE HYDROCHLORIDE 20 MG/ML
15 SOLUTION OROPHARYNGEAL ONCE
Status: COMPLETED | OUTPATIENT
Start: 2023-06-20 | End: 2023-06-20

## 2023-06-20 RX ADMIN — FLUTICASONE PROPIONATE 2 SPRAY: 50 SPRAY, METERED NASAL at 01:23

## 2023-06-20 RX ADMIN — IBUPROFEN 600 MG: 600 TABLET, FILM COATED ORAL at 01:22

## 2023-06-20 RX ADMIN — ACETAMINOPHEN 975 MG: 325 TABLET, FILM COATED ORAL at 01:21

## 2023-06-20 RX ADMIN — Medication 15 ML: at 01:23

## 2023-06-20 NOTE — DISCHARGE INSTRUCTIONS
Schedule an appointment with your primary care provider in the next 2 to 3 days for reevaluation  Use comfort measures of salt water gargles, throat lozenges, honey, and warm liquids to soothe your sore throat  Use 650 mg of acetaminophen (Tylenol) every 4 hours and/or 600 mg of ibuprofen (Motrin) every 6 hours as needed for pain or fever of 100 4 or greater  Return to the ER if you develop persistent fevers, new or worsening pain, inability tolerate fluids, neck stiffness, difficulty breathing, vomiting, weakness, confusion, or lethargy

## 2023-06-20 NOTE — Clinical Note
Kirti Ventura was seen and treated in our emergency department on 6/19/2023  Diagnosis:     Melania London  is off the rest of the shift today, may return to work on return date  She may return on this date: 06/22/2023         If you have any questions or concerns, please don't hesitate to call        Federico Flanagan    ______________________________           _______________          _______________  Hospital Representative                              Date                                Time

## 2023-06-20 NOTE — ED PROVIDER NOTES
History  Chief Complaint   Patient presents with   • Flu Symptoms     Pt reports sore throat, cough, and headache x1 week  Patient is a 81-year-old female with PMH of asthma presenting for evaluation of 1 week of URI-like symptoms  Patient notes she started with a sore throat and painful swallowing 6 days ago that she notes is on both sides  She also notes the development of bilateral ear pain described as fullness  She also developed a frontal headache described as pressure and constant  She also has a dry, hacking cough  She also has developed nasal congestion and rhinorrhea  She denies subjective fevers starting yesterday for which she took a dose of Tylenol  She has been taking NyQuil, Mucinex, and other over-the-counter cold/flu remedies with minimal relief of her symptoms  She denies lightheadedness/dizziness, vision changes, neck pain/stiffness, difficulty swallowing, CP/SOB, abdominal pain, N/V/D, urinary complaints, skin changes, leg swelling  History provided by:  Patient   used: No        Prior to Admission Medications   Prescriptions Last Dose Informant Patient Reported? Taking?    cyclobenzaprine (FLEXERIL) 10 mg tablet   No No   Sig: Take 1 tablet (10 mg total) by mouth 3 (three) times a day as needed for muscle spasms   ergocalciferol (VITAMIN D2) 50,000 units   No No   Sig: Take 1 capsule (50,000 Units total) by mouth once a week for 12 doses   meclizine (ANTIVERT) 25 mg tablet   No No   Sig: Take 1 tablet (25 mg total) by mouth 3 (three) times a day as needed for dizziness   methocarbamol (ROBAXIN) 500 mg tablet   No No   Sig: Take 1 tablet (500 mg total) by mouth 4 (four) times a day for 5 days   naproxen (NAPROSYN) 500 mg tablet   No No   Sig: Take 1 tablet (500 mg total) by mouth 2 (two) times a day as needed for mild pain   naproxen (Naprosyn) 500 mg tablet   No No   Sig: Take 1 tablet (500 mg total) by mouth 2 (two) times a day with meals for 3 days ondansetron (ZOFRAN) 4 mg tablet   No No   Sig: Take 1 tablet (4 mg total) by mouth every 6 (six) hours as needed for nausea or vomiting      Facility-Administered Medications: None       Past Medical History:   Diagnosis Date   • Asthma    • Back pain    • History of thyroid disorder    • History of tinea cruris    • History of vaginal discharge    • Microscopic hematuria     History   • Papilloma of tongue     History   • Vaginal candidiasis     History       History reviewed  No pertinent surgical history  Family History   Problem Relation Age of Onset   • Hypertension Mother    • Mental illness Mother         Disorder   • Substance Abuse Mother    • Breast cancer Mother 27   • Thyroid cancer Mother         Metastasis from thyroid cancer   • Ovarian cancer Mother 48   • Diabetes type I Mother         mellitus with other kidney complication   • Diabetes type II Mother         without complication, without long term current use of insulin    • Cancer Father    • Hypertension Maternal Grandmother    • Cancer Paternal Grandfather    • Ovarian cancer Maternal Aunt      I have reviewed and agree with the history as documented  E-Cigarette/Vaping   • E-Cigarette Use Never User      E-Cigarette/Vaping Substances     Social History     Tobacco Use   • Smoking status: Never   • Smokeless tobacco: Never   Vaping Use   • Vaping Use: Never used   Substance Use Topics   • Alcohol use: No   • Drug use: No       Review of Systems   Constitutional: Positive for fatigue and fever (Subjective fever x1 day)  Negative for appetite change and chills  HENT: Positive for congestion (Nasal), ear pain (Lateral, described as fullness), rhinorrhea, sinus pressure, sinus pain, sneezing, sore throat and voice change (More hoarse and raspy)  Negative for dental problem, drooling, ear discharge, facial swelling, nosebleeds, postnasal drip, tinnitus and trouble swallowing  Eyes: Negative for pain and visual disturbance  Respiratory: Positive for cough (Dry, hacking)  Negative for shortness of breath, wheezing and stridor  Cardiovascular: Negative for chest pain, palpitations and leg swelling  Gastrointestinal: Negative for diarrhea, nausea and vomiting  Genitourinary: Negative  Musculoskeletal: Negative for arthralgias, back pain, gait problem, joint swelling, neck pain and neck stiffness  Skin: Negative for color change, pallor, rash and wound  Allergic/Immunologic: Negative for immunocompromised state  Neurological: Positive for headaches  Negative for dizziness, tremors, seizures, syncope, facial asymmetry, speech difficulty, weakness, light-headedness and numbness  All other systems reviewed and are negative  Physical Exam  Physical Exam  Vitals and nursing note reviewed  Constitutional:       General: She is awake  She is not in acute distress  Appearance: Normal appearance  She is well-developed and normal weight  She is ill-appearing  She is not toxic-appearing or diaphoretic  HENT:      Head: Normocephalic and atraumatic  Jaw: There is normal jaw occlusion  No trismus  Right Ear: Ear canal and external ear normal  No drainage, swelling or tenderness  A middle ear effusion is present  Tympanic membrane is not perforated or erythematous  Left Ear: Ear canal and external ear normal  No drainage, swelling or tenderness  A middle ear effusion is present  Tympanic membrane is not perforated or erythematous  Nose: Congestion present  No rhinorrhea  Right Turbinates: Enlarged  Left Turbinates: Enlarged  Mouth/Throat:      Lips: Pink  No lesions  Mouth: Mucous membranes are moist       Dentition: Normal dentition  Tongue: No lesions  Palate: No lesions  Pharynx: Oropharynx is clear  Uvula midline  Posterior oropharyngeal erythema present  No pharyngeal swelling, oropharyngeal exudate or uvula swelling        Tonsils: No tonsillar exudate or tonsillar abscesses  0 on the right  0 on the left  Eyes:      General: Lids are normal  Vision grossly intact  Gaze aligned appropriately  No visual field deficit  Extraocular Movements: Extraocular movements intact  Right eye: Normal extraocular motion and no nystagmus  Left eye: Normal extraocular motion and no nystagmus  Conjunctiva/sclera: Conjunctivae normal       Pupils: Pupils are equal, round, and reactive to light  Neck:      Trachea: No abnormal tracheal secretions  Cardiovascular:      Rate and Rhythm: Normal rate  Pulses:           Radial pulses are 2+ on the right side and 2+ on the left side  Dorsalis pedis pulses are 2+ on the right side and 2+ on the left side  Posterior tibial pulses are 2+ on the right side and 2+ on the left side  Heart sounds: Normal heart sounds, S1 normal and S2 normal  No murmur heard  Pulmonary:      Effort: Pulmonary effort is normal  No tachypnea or respiratory distress  Breath sounds: Normal breath sounds and air entry  No stridor, decreased air movement or transmitted upper airway sounds  No decreased breath sounds  Musculoskeletal:         General: Normal range of motion  Cervical back: Full passive range of motion without pain, normal range of motion and neck supple  Right lower leg: No edema  Left lower leg: No edema  Comments: HATCH, 5/5 strength throughout, sensation intact, no focal joint swelling  Ambulatory with steady gait  Lymphadenopathy:      Cervical: Cervical adenopathy present  Right cervical: No superficial, deep or posterior cervical adenopathy  Left cervical: Superficial cervical adenopathy present  No deep or posterior cervical adenopathy  Skin:     General: Skin is warm and dry  Capillary Refill: Capillary refill takes less than 2 seconds  Findings: No rash or wound  Neurological:      General: No focal deficit present        Mental Status: She is alert and oriented to person, place, and time  Mental status is at baseline  GCS: GCS eye subscore is 4  GCS verbal subscore is 5  GCS motor subscore is 6  Cranial Nerves: Cranial nerves 2-12 are intact  Sensory: Sensation is intact  Motor: Motor function is intact  Gait: Gait is intact  Psychiatric:         Behavior: Behavior is cooperative  Vital Signs  ED Triage Vitals [06/19/23 2250]   Temperature Pulse Respirations Blood Pressure SpO2   98 4 °F (36 9 °C) 84 18 155/98 99 %      Temp Source Heart Rate Source Patient Position - Orthostatic VS BP Location FiO2 (%)   Oral Monitor Sitting Right arm --      Pain Score       6           Vitals:    06/19/23 2250 06/20/23 0045   BP: 155/98 151/97   Pulse: 84 73   Patient Position - Orthostatic VS: Sitting          Visual Acuity      ED Medications  Medications   fluticasone (FLONASE) 50 mcg/act nasal spray 2 spray (2 sprays Each Nare Given 6/20/23 0123)   Lidocaine Viscous HCl (XYLOCAINE) 2 % mucosal solution 15 mL (15 mL Swish & Spit Given 6/20/23 0123)   acetaminophen (TYLENOL) tablet 975 mg (975 mg Oral Given 6/20/23 0121)   ibuprofen (MOTRIN) tablet 600 mg (600 mg Oral Given 6/20/23 0122)       Diagnostic Studies  Results Reviewed     Procedure Component Value Units Date/Time    Strep A PCR [[id number]]  (Normal) Collected: 06/20/23 0136    Lab Status: Final result Specimen: Throat Updated: 06/20/23 0205     STREP A PCR Not Detected    FLU/RSV/COVID - if FLU/RSV clinically relevant [[id number]]  (Normal) Collected: 06/19/23 2251    Lab Status: Final result Specimen: Nares from Nasopharyngeal Swab Updated: 06/19/23 2350     SARS-CoV-2 Negative     INFLUENZA A PCR Negative     INFLUENZA B PCR Negative     RSV PCR Negative    Narrative:      FOR PEDIATRIC PATIENTS - copy/paste COVID Guidelines URL to browser: [email or web address]    SARS-CoV-2 assay is a Nucleic Acid Amplification assay intended for the  qualitative detection of nucleic acid from SARS-CoV-2 in nasopharyngeal  swabs  Results are for the presumptive identification of SARS-CoV-2 RNA  Positive results are indicative of infection with SARS-CoV-2, the virus  causing COVID-19, but do not rule out bacterial infection or co-infection  with other viruses  Laboratories within the [place] and its  territories are required to report all positive results to the appropriate  public health authorities  Negative results do not preclude SARS-CoV-2  infection and should not be used as the sole basis for treatment or other  patient management decisions  Negative results must be combined with  clinical observations, patient history, and epidemiological information  This test has not been FDA cleared or approved  This test has been authorized by FDA under an Emergency Use Authorization  (EUA)  This test is only authorized for the duration of time the  declaration that circumstances exist justifying the authorization of the  emergency use of an in vitro diagnostic tests for detection of SARS-CoV-2  virus and/or diagnosis of COVID-19 infection under section 564(b)(1) of  the Act, 21 U  S C  916KQX-5(G)(7), unless the authorization is terminated  or revoked sooner  The test has been validated but independent review by FDA  and CLIA is pending  Test performed using [employer] GeneXpert: This RT-PCR assay targets N2,  a region unique to SARS-CoV-2  A conserved region in the E-gene was chosen  for pan-Sarbecovirus detection which includes SARS-CoV-2  According to CMS-2020-01-R, this platform meets the definition of high-throughput technology  XR chest 2 views   ED Interpretation by VARGHESE Ortega (06/20 0155)   No focal consolidation identified by me                   Procedures  Procedures         ED Course  ED Course as of 06/20/23 0822   Tue Jun 20, 2023   0044 Triage vital signs stable   0044 FLU/RSV/COVID - if FLU/RSV clinically relevant  Placed by first nurse orders, negative viral swab results                                             Medical Decision Making  DDx including but not limited to: Viral illness, URI, pharyngitis, sinusitis, otitis media, otitis externa, middle ear effusion; considered but doubt epiglottitis, peritonsillar abscess, retropharyngeal abscess    Patient is a 43-year-old female presenting for evaluation of 1 week of frontal headaches, nasal congestion, rhinorrhea, bilateral ear fullness, sore throat, painful swallowing, and dry hacking cough  Patient's triage vital signs are stable and without fever  On initial examination, patient appears fatigued but otherwise is in no acute distress  Her head to physical exam is with finding of bilateral middle ear effusions, nasal congestion, frontal sinus tenderness, tonsillar erythema without swelling or exudate, left-sided cervical adenopathy, normal S1-S2, clear lung sounds throughout  Discussed with patient symptomatic care for suspected viral URI with cough  Patient evidence to have frequent, dry, hacking cough on exam   Given 1 day of subjective fevers in the setting of 6 days of URI symptoms, will obtain chest x-ray to further evaluate for developing pneumonia  Chest x-ray ED wet read without acute cardiopulmonary disease  Given adenopathy with tonsillar erythema, strep a swab sent and negative  I discussed with acute sinusitis that typically we do supportive care until day 10 and if at that time having fevers, persistent pain, will discuss need for antibiotics  At this time, no indication for antibiotics  Patient given medications for supportive care  On reassessment, she notes slight improvement in her sore throat and frontal headache  She notes an ongoing intermittent, dry cough  Plan made for follow-up with primary care in the next 2 to 3 days for reevaluation of symptoms    Plan for Tylenol and ibuprofen for headache/sore throat, throat lozenges, honey, warm liquids, and salt water gargles  Patient in agreement with plan  DC paperwork reviewed with emphasis on follow-up primary care, supportive measures, and strict return precautions  Patient is stable, in no acute distress, with normal and stable vital signs at time of discharge  Acute bronchitis: acute illness or injury  Middle ear effusion, bilateral: acute illness or injury  Viral URI with cough: acute illness or injury  Amount and/or Complexity of Data Reviewed  Labs: ordered  Decision-making details documented in ED Course  Radiology: ordered and independent interpretation performed  Risk  OTC drugs  Prescription drug management  Disposition  Final diagnoses:   Viral URI with cough   Acute bronchitis   Middle ear effusion, bilateral     Time reflects when diagnosis was documented in both MDM as applicable and the Disposition within this note     Time User Action Codes Description Comment    6/20/2023  1:56 AM [facility] Viral URI with cough     6/20/2023  1:56 AM [facility] Add [J20 9] Acute bronchitis     6/20/2023  1:57 AM Hal Jacobo Add [H65 93] Middle ear effusion, bilateral       ED Disposition     ED Disposition   Discharge    Condition   Stable    Date/Time   Tue Jun 20, 2023  1:56 AM    Comment   Luis Alfredo Curtis discharge to home/self care                 Follow-up Information     Follow up With Specialties Details Why Contact Info Additional [address] Siddiqui MD Family Medicine, Obstetrics and Gynecology, Obstetrics, Gynecology Schedule an appointment as soon as possible for a visit in 3 days  [address] [facility] Emergency Department Emergency Medicine Go to  If symptoms worsen [address] Emergency Department, [address], [place], [place], [address] Discharge Medication List as of 6/20/2023  2:01 AM      CONTINUE these medications which have NOT CHANGED    Details   cyclobenzaprine (FLEXERIL) 10 mg tablet Take 1 tablet (10 mg total) by mouth 3 (three) times a day as needed for muscle spasms, Starting Tue 6/21/2022, Normal      ergocalciferol (VITAMIN D2) 50,000 units Take 1 capsule (50,000 Units total) by mouth once a week for 12 doses, Starting Mon 2/21/2022, Until Tue 5/10/2022, Normal      meclizine (ANTIVERT) 25 mg tablet Take 1 tablet (25 mg total) by mouth 3 (three) times a day as needed for dizziness, Starting Tue 11/22/2022, Normal      methocarbamol (ROBAXIN) 500 mg tablet Take 1 tablet (500 mg total) by mouth 4 (four) times a day for 5 days, Starting Tue 3/14/2023, Until Sun 3/19/2023, Normal      naproxen (NAPROSYN) 500 mg tablet Take 1 tablet (500 mg total) by mouth 2 (two) times a day as needed for mild pain, Starting Tue 6/21/2022, Normal      ondansetron (ZOFRAN) 4 mg tablet Take 1 tablet (4 mg total) by mouth every 6 (six) hours as needed for nausea or vomiting, Starting Tue 11/22/2022, Normal             No discharge procedures on file      PDMP Review       Value Time User    PDMP Reviewed  Yes 3/14/2023 12:39 AM Carry ROSA Tucker          ED Provider  Electronically Signed by           Salvador Stinson  06/20/23 7527

## 2023-06-21 ENCOUNTER — ANNUAL EXAM (OUTPATIENT)
Dept: OBGYN CLINIC | Facility: CLINIC | Age: 48
End: 2023-06-21
Payer: COMMERCIAL

## 2023-06-21 VITALS
SYSTOLIC BLOOD PRESSURE: 120 MMHG | BODY MASS INDEX: 21.07 KG/M2 | WEIGHT: 123.4 LBS | HEIGHT: 64 IN | DIASTOLIC BLOOD PRESSURE: 80 MMHG

## 2023-06-21 DIAGNOSIS — Z01.411 ENCOUNTER FOR GYNECOLOGICAL EXAMINATION WITH ABNORMAL FINDING: Primary | ICD-10-CM

## 2023-06-21 DIAGNOSIS — Z12.31 ENCOUNTER FOR SCREENING MAMMOGRAM FOR MALIGNANT NEOPLASM OF BREAST: ICD-10-CM

## 2023-06-21 DIAGNOSIS — Z01.419 PAP SMEAR, AS PART OF ROUTINE GYNECOLOGICAL EXAMINATION: ICD-10-CM

## 2023-06-21 DIAGNOSIS — Z12.12 ENCOUNTER FOR COLORECTAL CANCER SCREENING: ICD-10-CM

## 2023-06-21 DIAGNOSIS — Z12.11 ENCOUNTER FOR COLORECTAL CANCER SCREENING: ICD-10-CM

## 2023-06-21 PROCEDURE — 99396 PREV VISIT EST AGE 40-64: CPT | Performed by: OBSTETRICS & GYNECOLOGY

## 2023-06-21 PROCEDURE — G0145 SCR C/V CYTO,THINLAYER,RESCR: HCPCS | Performed by: OBSTETRICS & GYNECOLOGY

## 2023-06-21 RX ORDER — SPIRONOLACTONE 50 MG/1
TABLET, FILM COATED ORAL
COMMUNITY
Start: 2023-06-21

## 2023-06-21 NOTE — PROGRESS NOTES
Assessment/Plan:    No problem-specific Assessment & Plan notes found for this encounter  Diagnoses and all orders for this visit:    Encounter for gynecological examination with abnormal finding  -     Liquid-based pap, screening    Pap smear, as part of routine gynecological examination    Encounter for screening mammogram for malignant neoplasm of breast  -     Mammo screening bilateral w 3d & cad; Future    Encounter for colorectal cancer screening  -     Ambulatory Referral to Colorectal Surgery; Future    Other orders  -     spironolactone (ALDACTONE) 50 mg tablet;  (Patient not taking: Reported on 6/21/2023)          Normal gynecological physical examination  Self-breast examination stressed  Mammogram ordered  Discussed regular exercise, healthy diet, importance of vitamin D and calcium supplements  Discussed importance of sun block use during periods of prolonged sun exposure  Patient will be seen in 1 year for routine gynecologic and medical examination  Patient will call office for any problems, concerns, or issues which may arise during the interim  Subjective:          Radha Henderson is a 46-year-old female followed for lumbar disc pain coming in for annual appointment  She states she does not have any concerns today and has not noticed any changes since her last annual visit  She does self breast exams at home and denies noticing any masses, areas of tenderness, discoloration, or nipple changes  She is behind on her mammogram testing and has not received one since 2018, but it was completely benign  Breast exam completed in office today was also normal   She has not had a period in over a year and denies any intermittent bleeding, vaginal discharge, flank pain, urinary symptoms, or any vasomotor symptoms at this time  She has not received a colonoscopy yet  Pap smear completed in office today  We will see her back in 1 year for annual exam       Patient ID: Tessie No is a 53 y o  "female who presents today for her annual gynecologic and medical examination    Menstrual status: Postmenopausal    Vasomotor symptoms: Denies any symptoms at this time  Patient reports normal appetite    Patient reports normal bowel and bladder habits    Patient denies any significant pelvic or abdominal pain    Patient denies any headaches, chest pain, shortness of breath fever shakes or chills    Patient denies any COVID 19 symptoms including cough or loss of taste or smell    COVID vaccine status: Fully vaccinated    Medical problems: Followed for lumbar disc pain  Colonoscopy status: Has not received a colonoscopy yet  Mammogram status: Is behind on mammogram screening, her last one was performed in 2018 and was completely benign  Repeat mammogram ordered today for patient  The following portions of the patient's history were reviewed and updated as appropriate: allergies, current medications, past family history, past medical history, past social history, past surgical history and problem list     Review of Systems   Constitutional: Negative  Negative for appetite change, diaphoresis, fatigue, fever and unexpected weight change  HENT: Negative  Eyes: Negative  Respiratory: Negative  Cardiovascular: Negative  Gastrointestinal: Negative  Negative for abdominal pain, blood in stool, constipation, diarrhea, nausea and vomiting  Endocrine: Negative  Negative for cold intolerance and heat intolerance  Genitourinary: Negative  Negative for dysuria, frequency, hematuria, urgency, vaginal bleeding, vaginal discharge and vaginal pain  Musculoskeletal: Negative  Skin: Negative  Allergic/Immunologic: Negative  Neurological: Negative  Hematological: Negative  Negative for adenopathy  Psychiatric/Behavioral: Negative            Objective:      /80   Ht 5' 4\" (1 626 m)   Wt 56 kg (123 lb 6 4 oz)   LMP 05/06/2021   BMI 21 18 kg/m²          Physical " Exam  Constitutional:       Appearance: Normal appearance  She is well-developed  HENT:      Head: Normocephalic  Eyes:      Pupils: Pupils are equal, round, and reactive to light  Cardiovascular:      Rate and Rhythm: Normal rate and regular rhythm  Pulmonary:      Effort: Pulmonary effort is normal       Breath sounds: Normal breath sounds  Chest:   Breasts:     Breasts are symmetrical       Right: No inverted nipple, mass, nipple discharge, skin change or tenderness  Left: No inverted nipple, mass, nipple discharge, skin change or tenderness  Abdominal:      General: Bowel sounds are normal       Palpations: Abdomen is soft  Genitourinary:     General: Normal vulva  Exam position: Supine  Labia:         Right: No rash, tenderness, lesion or injury  Left: No rash, tenderness, lesion or injury  Vagina: Normal  No erythema, tenderness or bleeding  Cervix: No discharge or friability  Uterus: Not enlarged, not fixed and not tender  Adnexa:         Right: No mass, tenderness or fullness  Left: No mass, tenderness or fullness  Rectum: Normal  Guaiac result negative  Musculoskeletal:         General: Normal range of motion  Cervical back: Normal range of motion and neck supple  Comments: Followed for chronic lower back pain  Lymphadenopathy:      Cervical: No cervical adenopathy  Upper Body:      Right upper body: No supraclavicular adenopathy  Left upper body: No supraclavicular adenopathy  Skin:     General: Skin is warm and dry  Findings: No rash  Neurological:      General: No focal deficit present  Mental Status: She is alert and oriented to person, place, and time  Psychiatric:         Mood and Affect: Mood normal          Speech: Speech normal          Behavior: Behavior normal          Thought Content:  Thought content normal          Judgment: Judgment normal

## 2023-06-21 NOTE — PATIENT INSTRUCTIONS
Normal gynecological physical examination  Self-breast examination stressed  Mammogram ordered  Patient is behind on her mammogram screening, encouraged to obtain a mammogram in the near future  Patient educated on the importance of getting a colonoscopy scheduled in the near future  Discussed regular exercise, healthy diet, importance of vitamin D and calcium supplements  All patient's questions answered in office today  Discussed importance of sun block use during periods of prolonged sun exposure  Patient will be seen in 1 year for routine gynecologic and medical examination  Patient will call office for any problems, concerns, or issues which may arise during the interim

## 2023-06-30 LAB
LAB AP GYN PRIMARY INTERPRETATION: NORMAL
Lab: NORMAL
PATH INTERP SPEC-IMP: NORMAL

## 2023-07-06 DIAGNOSIS — B96.89 BV (BACTERIAL VAGINOSIS): Primary | ICD-10-CM

## 2023-07-06 DIAGNOSIS — N76.0 BV (BACTERIAL VAGINOSIS): Primary | ICD-10-CM

## 2023-07-06 NOTE — TELEPHONE ENCOUNTER
----- Message from Sonia Luevano MD sent at 7/4/2023  6:35 PM EDT -----  Pap smear was negative but showed signs of BV    Please send prescription into pharmacy for Flagyl 500 mg dispense 14 tablets take 1 tablet twice daily for 7 days    Thanks

## 2023-07-10 RX ORDER — METRONIDAZOLE 500 MG/1
TABLET ORAL
Qty: 14 TABLET | Refills: 0 | Status: SHIPPED | OUTPATIENT
Start: 2023-07-10 | End: 2023-07-17

## 2023-07-20 ENCOUNTER — VBI (OUTPATIENT)
Dept: ADMINISTRATIVE | Facility: OTHER | Age: 48
End: 2023-07-20

## 2023-07-24 ENCOUNTER — OFFICE VISIT (OUTPATIENT)
Dept: FAMILY MEDICINE CLINIC | Facility: CLINIC | Age: 48
End: 2023-07-24
Payer: COMMERCIAL

## 2023-07-24 VITALS
DIASTOLIC BLOOD PRESSURE: 81 MMHG | HEIGHT: 64 IN | WEIGHT: 123 LBS | OXYGEN SATURATION: 100 % | TEMPERATURE: 99 F | SYSTOLIC BLOOD PRESSURE: 114 MMHG | HEART RATE: 91 BPM | BODY MASS INDEX: 21 KG/M2

## 2023-07-24 DIAGNOSIS — M54.41 ACUTE RIGHT-SIDED LOW BACK PAIN WITH RIGHT-SIDED SCIATICA: ICD-10-CM

## 2023-07-24 PROCEDURE — 99213 OFFICE O/P EST LOW 20 MIN: CPT

## 2023-07-24 RX ORDER — LIDOCAINE 50 MG/G
1 PATCH TOPICAL DAILY
Qty: 14 PATCH | Refills: 0 | Status: SHIPPED | OUTPATIENT
Start: 2023-07-24

## 2023-07-24 RX ORDER — GABAPENTIN 100 MG/1
100 CAPSULE ORAL 3 TIMES DAILY PRN
Qty: 90 CAPSULE | Refills: 2 | Status: SHIPPED | OUTPATIENT
Start: 2023-07-24

## 2023-07-24 RX ORDER — CYCLOBENZAPRINE HCL 5 MG
5 TABLET ORAL
Qty: 30 TABLET | Refills: 2 | Status: SHIPPED | OUTPATIENT
Start: 2023-07-24

## 2023-07-24 NOTE — LETTER
July 24, 2023     Patient: Chantell Muñoz  YOB: 1975  Date of Visit: 7/24/2023      To Whom it May Concern:    Chantell Muñoz is under my professional care. Ady Bentley was seen in my office on 7/24/2023. Ady Bentley has medical conditions that limit what she can do at work. She should not lift more than 15lbs and should not work more than 20h in one week. If you have any questions or concerns, please don't hesitate to call.          Sincerely,          Zeny Messina MD        CC: No Recipients

## 2023-07-24 NOTE — LETTER
July 24, 2023     Patient: Dawn Porras  YOB: 1975  Date of Visit: 7/24/2023      To Whom it May Concern:    Dawn Porras is under my professional care. Hill Kelly was seen in my office on 7/24/2023. Hill Kelly may return to work on 7/26/23. If you have any questions or concerns, please don't hesitate to call.          Sincerely,          Phillip Zhong MD        CC: No Recipients
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
## 2023-08-04 ENCOUNTER — TELEPHONE (OUTPATIENT)
Dept: OBGYN CLINIC | Facility: CLINIC | Age: 48
End: 2023-08-04

## 2023-08-04 NOTE — TELEPHONE ENCOUNTER
Called patient to follow up if she took Flagyl as recommended for shift in dt suggestive of BACTERIAL VAGINOSIS on pap results 6/21/2023 - no answer, mailbox full ([employer] message on 7/10/2023 not read).

## 2023-08-04 NOTE — TELEPHONE ENCOUNTER
Called [facility] pharm [facility]) - patient has not picked up Flagyl prescription.

## 2024-11-07 ENCOUNTER — DOCUMENTATION (OUTPATIENT)
Dept: FAMILY MEDICINE CLINIC | Facility: CLINIC | Age: 49
End: 2024-11-07

## 2024-11-08 ENCOUNTER — TELEPHONE (OUTPATIENT)
Age: 49
End: 2024-11-08

## 2024-11-22 ENCOUNTER — TELEPHONE (OUTPATIENT)
Age: 49
End: 2024-11-22

## 2024-11-22 NOTE — TELEPHONE ENCOUNTER
No Pt is requesting to schedule with Dr. Herrera but has not been seen by him in some time. Please contact pt if ok to schedule; she would like an appt same day as her son

## 2025-01-16 PROBLEM — U07.1 COVID-19: Status: RESOLVED | Noted: 2022-01-20 | Resolved: 2025-01-16

## 2025-01-16 PROBLEM — R10.9 ABDOMINAL PAIN IN FEMALE: Status: RESOLVED | Noted: 2020-01-26 | Resolved: 2025-01-16

## 2025-04-29 ENCOUNTER — ANNUAL EXAM (OUTPATIENT)
Dept: OBGYN CLINIC | Facility: CLINIC | Age: 50
End: 2025-04-29
Payer: COMMERCIAL

## 2025-04-29 VITALS — DIASTOLIC BLOOD PRESSURE: 80 MMHG | BODY MASS INDEX: 21.7 KG/M2 | WEIGHT: 126.4 LBS | SYSTOLIC BLOOD PRESSURE: 120 MMHG

## 2025-04-29 DIAGNOSIS — Z01.419 PAP SMEAR, AS PART OF ROUTINE GYNECOLOGICAL EXAMINATION: ICD-10-CM

## 2025-04-29 DIAGNOSIS — Z12.31 ENCOUNTER FOR SCREENING MAMMOGRAM FOR MALIGNANT NEOPLASM OF BREAST: ICD-10-CM

## 2025-04-29 DIAGNOSIS — Z12.11 COLON CANCER SCREENING: ICD-10-CM

## 2025-04-29 DIAGNOSIS — N95.2 ATROPHIC VAGINITIS: ICD-10-CM

## 2025-04-29 DIAGNOSIS — Z01.419 ENCOUNTER FOR GYNECOLOGICAL EXAMINATION WITHOUT ABNORMAL FINDING: Primary | ICD-10-CM

## 2025-04-29 PROCEDURE — 99396 PREV VISIT EST AGE 40-64: CPT | Performed by: OBSTETRICS & GYNECOLOGY

## 2025-04-29 NOTE — PATIENT INSTRUCTIONS
Normal gynecological physical examination.  Self-breast examination stressed.  Mammogram ordered.  Cologuard ordered.  Discussed regular exercise, healthy diet, importance of vitamin D and calcium supplements.  Discussed importance of sun block use during periods of prolonged sun exposure.  Patient will be seen in 1 year for routine gynecologic and medical examination.  Patient will call office for any problems, concerns, or issues which may arise during the interim.

## 2025-04-29 NOTE — PROGRESS NOTES
Assessment/Plan:    No problem-specific Assessment & Plan notes found for this encounter.       Diagnoses and all orders for this visit:    Encounter for gynecological examination without abnormal finding    Pap smear, as part of routine gynecological examination    Encounter for screening mammogram for malignant neoplasm of breast    Atrophic vaginitis    Colon cancer screening  -     Cologuard          Normal gynecological physical examination.  Self-breast examination stressed.  Mammogram ordered.  Discussed regular exercise, healthy diet, importance of vitamin D and calcium supplements.  Discussed importance of sun block use during periods of prolonged sun exposure.  Patient will be seen in 1 year for routine gynecologic and medical examination.  Patient will call office for any problems, concerns, or issues which may arise during the interim.     Subjective:          HPI    Patient ID: Kori Jhaveri is a 50 y.o. female who presents today for her annual gynecologic and medical examination    Menstrual status: Postmenopausal.    Vasomotor symptoms: Denies.    Patient reports normal appetite    Patient reports normal bowel and bladder habits    Patient denies any significant pelvic or abdominal pain    Patient denies any headaches, chest pain, shortness of breath fever shakes or chills    Patient denies any COVID 19 symptoms including cough or loss of taste or smell    COVID vaccine status: Patient wear COVID-vaccine protocol.    Medical problems: Moderate persistent asthma, chronic maxillary sinusitis, thyroid nodule, peripheral neuropathy, lumbar radiculopathy, lumbar disc herniation, vitamin D, vitamin B12 deficiency.    Colonoscopy status: Not up-to-date at this time.  Cologuard ordered at this time at today's visit.    Mammogram status: Not up-to-date at this time last mammogram in 2018.    The following portions of the patient's history were reviewed and updated as appropriate: allergies, current  medications, past family history, past medical history, past social history, past surgical history and problem list.    Review of Systems   Constitutional: Negative.  Negative for appetite change, diaphoresis, fatigue, fever and unexpected weight change.        Following for vitamin D and vitamin B12 deficiency.   HENT: Negative.          Following for chronic maxillary sinusitis   Eyes: Negative.    Respiratory: Negative.          Following for moderate persistent asthma.   Cardiovascular: Negative.    Gastrointestinal: Negative.  Negative for abdominal pain, blood in stool, constipation, diarrhea, nausea and vomiting.   Endocrine: Negative.  Negative for cold intolerance and heat intolerance.        Following for thyroid nodule.   Genitourinary: Negative.  Negative for dysuria, frequency, hematuria, urgency, vaginal bleeding, vaginal discharge and vaginal pain.   Musculoskeletal: Negative.         Following for lumbar disc herniation   Skin: Negative.    Allergic/Immunologic: Negative.    Neurological: Negative.         Following for peripheral neuropathy and lumbar radiculopathy   Hematological: Negative.  Negative for adenopathy.   Psychiatric/Behavioral: Negative.           Objective:      /80   Wt 57.3 kg (126 lb 6.4 oz)   LMP 05/06/2021   BMI 21.70 kg/m²          Physical Exam  Constitutional:       General: She is not in acute distress.     Appearance: Normal appearance. She is well-developed. She is not diaphoretic.   HENT:      Head: Normocephalic and atraumatic.   Eyes:      Pupils: Pupils are equal, round, and reactive to light.   Cardiovascular:      Rate and Rhythm: Normal rate and regular rhythm.      Heart sounds: Normal heart sounds. No murmur heard.     No friction rub. No gallop.   Pulmonary:      Effort: Pulmonary effort is normal.      Breath sounds: Normal breath sounds.   Chest:   Breasts:     Breasts are symmetrical.      Right: No inverted nipple, mass, nipple discharge, skin change  or tenderness.      Left: No inverted nipple, mass, nipple discharge, skin change or tenderness.   Abdominal:      General: Bowel sounds are normal.      Palpations: Abdomen is soft.   Genitourinary:     General: Normal vulva.      Exam position: Supine.      Labia:         Right: No rash or lesion.         Left: No rash or lesion.       Vagina: Normal. No vaginal discharge, erythema, tenderness or bleeding.      Cervix: No discharge or friability.      Uterus: Not enlarged and not tender.       Adnexa:         Right: No mass, tenderness or fullness.          Left: No mass, tenderness or fullness.        Rectum: Normal. Guaiac result negative.   Musculoskeletal:         General: Normal range of motion.      Cervical back: Normal range of motion and neck supple.   Lymphadenopathy:      Cervical: No cervical adenopathy.      Upper Body:      Right upper body: No supraclavicular adenopathy.      Left upper body: No supraclavicular adenopathy.   Skin:     General: Skin is warm and dry.      Findings: No rash.   Neurological:      General: No focal deficit present.      Mental Status: She is alert and oriented to person, place, and time.   Psychiatric:         Mood and Affect: Mood normal.         Speech: Speech normal.         Behavior: Behavior normal.         Thought Content: Thought content normal.         Judgment: Judgment normal.

## 2025-05-01 ENCOUNTER — HOSPITAL ENCOUNTER (OUTPATIENT)
Dept: RADIOLOGY | Facility: IMAGING CENTER | Age: 50
End: 2025-05-01
Attending: OBSTETRICS & GYNECOLOGY
Payer: COMMERCIAL

## 2025-05-01 VITALS — BODY MASS INDEX: 21.51 KG/M2 | WEIGHT: 126 LBS | HEIGHT: 64 IN

## 2025-05-01 DIAGNOSIS — Z12.31 ENCOUNTER FOR SCREENING MAMMOGRAM FOR MALIGNANT NEOPLASM OF BREAST: ICD-10-CM

## 2025-05-01 PROCEDURE — 77067 SCR MAMMO BI INCL CAD: CPT

## 2025-05-01 PROCEDURE — 77063 BREAST TOMOSYNTHESIS BI: CPT

## 2025-05-05 ENCOUNTER — TELEPHONE (OUTPATIENT)
Age: 50
End: 2025-05-05

## 2025-05-05 LAB
CLINICAL INFO: NORMAL
CYTO CVX: NORMAL
CYTOLOGY CMNT CVX/VAG CYTO-IMP: NORMAL
DATE PREVIOUS BX: NORMAL
HPV E6+E7 MRNA CVX QL NAA+PROBE: NOT DETECTED
LMP START DATE: NORMAL
SL AMB PREV. PAP:: NORMAL
SPECIMEN SOURCE CVX/VAG CYTO: NORMAL

## 2025-05-05 NOTE — TELEPHONE ENCOUNTER
Patient called regarding mammogram results, pt aware it resulted abnormal & was given additional orders by radiology team.     Advised resulted yesterday morning, please allow additional time for Dr Snow to interpret & contact w/ results. Pt verbalized understanding & will make additional appts with central scheduling in mean time.

## 2025-05-15 ENCOUNTER — RESULTS FOLLOW-UP (OUTPATIENT)
Dept: OBGYN CLINIC | Facility: CLINIC | Age: 50
End: 2025-05-15

## 2025-05-22 LAB — COLOGUARD RESULT REPORTABLE: NEGATIVE

## 2025-06-04 ENCOUNTER — RESULTS FOLLOW-UP (OUTPATIENT)
Dept: OBGYN CLINIC | Facility: CLINIC | Age: 50
End: 2025-06-04

## 2025-06-18 ENCOUNTER — OCCMED (OUTPATIENT)
Dept: URGENT CARE | Facility: CLINIC | Age: 50
End: 2025-06-18

## 2025-06-18 ENCOUNTER — APPOINTMENT (OUTPATIENT)
Dept: LAB | Facility: CLINIC | Age: 50
End: 2025-06-18
Attending: PHYSICIAN ASSISTANT

## 2025-06-18 DIAGNOSIS — Z02.1 PRE-EMPLOYMENT EXAMINATION: ICD-10-CM

## 2025-06-18 DIAGNOSIS — Z02.1 PRE-EMPLOYMENT EXAMINATION: Primary | ICD-10-CM

## 2025-06-18 LAB — RUBV IGG SERPL IA-ACNC: 211.7 IU/ML

## 2025-06-18 PROCEDURE — 86787 VARICELLA-ZOSTER ANTIBODY: CPT

## 2025-06-18 PROCEDURE — 86480 TB TEST CELL IMMUN MEASURE: CPT

## 2025-06-18 PROCEDURE — 86735 MUMPS ANTIBODY: CPT

## 2025-06-18 PROCEDURE — 86765 RUBEOLA ANTIBODY: CPT

## 2025-06-18 PROCEDURE — 36415 COLL VENOUS BLD VENIPUNCTURE: CPT

## 2025-06-18 PROCEDURE — 86762 RUBELLA ANTIBODY: CPT

## 2025-06-19 LAB
GAMMA INTERFERON BACKGROUND BLD IA-ACNC: 0.05 IU/ML
M TB IFN-G BLD-IMP: NEGATIVE
M TB IFN-G CD4+ BCKGRND COR BLD-ACNC: 0.02 IU/ML
M TB IFN-G CD4+ BCKGRND COR BLD-ACNC: 0.08 IU/ML
MEV IGG SER QL IA: 246 AU/ML
MEV IGG SER QL IA: POSITIVE
MITOGEN IGNF BCKGRD COR BLD-ACNC: 9.95 IU/ML
MUV IGG SER QL IA: >300 AU/ML
MUV IGG SER QL IA: POSITIVE
VZV IGG SER QL IA: 12.1 S/CO
VZV IGG SER QL IA: POSITIVE